# Patient Record
Sex: FEMALE | Race: WHITE | Employment: OTHER | URBAN - METROPOLITAN AREA
[De-identification: names, ages, dates, MRNs, and addresses within clinical notes are randomized per-mention and may not be internally consistent; named-entity substitution may affect disease eponyms.]

---

## 2017-01-04 DIAGNOSIS — E03.8 OTHER SPECIFIED HYPOTHYROIDISM: ICD-10-CM

## 2017-01-04 RX ORDER — LEVOTHYROXINE SODIUM 0.07 MG/1
75 TABLET ORAL DAILY
Qty: 90 TABLET | Refills: 3 | Status: SHIPPED | OUTPATIENT
Start: 2017-01-04 | End: 2017-12-12 | Stop reason: SDUPTHER

## 2017-02-27 ENCOUNTER — OFFICE VISIT (OUTPATIENT)
Dept: FAMILY MEDICINE CLINIC | Age: 67
End: 2017-02-27

## 2017-02-27 VITALS
SYSTOLIC BLOOD PRESSURE: 106 MMHG | DIASTOLIC BLOOD PRESSURE: 60 MMHG | TEMPERATURE: 99 F | HEART RATE: 70 BPM | RESPIRATION RATE: 16 BRPM | WEIGHT: 164 LBS | BODY MASS INDEX: 26.36 KG/M2 | HEIGHT: 66 IN

## 2017-02-27 DIAGNOSIS — Z12.31 ENCOUNTER FOR SCREENING MAMMOGRAM FOR BREAST CANCER: ICD-10-CM

## 2017-02-27 DIAGNOSIS — E03.9 HYPOTHYROIDISM, UNSPECIFIED TYPE: ICD-10-CM

## 2017-02-27 DIAGNOSIS — E03.9 HYPOTHYROIDISM, UNSPECIFIED TYPE: Primary | ICD-10-CM

## 2017-02-27 DIAGNOSIS — Z23 NEED FOR PNEUMOCOCCAL VACCINATION: ICD-10-CM

## 2017-02-27 LAB
ALBUMIN SERPL-MCNC: 4.5 G/DL (ref 3.9–4.9)
ALP BLD-CCNC: 110 U/L (ref 40–130)
ALT SERPL-CCNC: 11 U/L (ref 0–33)
ANION GAP SERPL CALCULATED.3IONS-SCNC: 11 MEQ/L (ref 7–13)
AST SERPL-CCNC: 15 U/L (ref 0–35)
BILIRUB SERPL-MCNC: 0.2 MG/DL (ref 0–1.2)
BUN BLDV-MCNC: 20 MG/DL (ref 8–23)
CALCIUM SERPL-MCNC: 9.7 MG/DL (ref 8.6–10.2)
CHLORIDE BLD-SCNC: 102 MEQ/L (ref 98–107)
CHOLESTEROL, TOTAL: 200 MG/DL (ref 0–199)
CO2: 27 MEQ/L (ref 22–29)
CREAT SERPL-MCNC: 0.89 MG/DL (ref 0.5–0.9)
GFR AFRICAN AMERICAN: >60
GFR NON-AFRICAN AMERICAN: >60
GLOBULIN: 2.7 G/DL (ref 2.3–3.5)
GLUCOSE BLD-MCNC: 92 MG/DL (ref 74–109)
HCT VFR BLD CALC: 38.2 % (ref 37–47)
HDLC SERPL-MCNC: 71 MG/DL (ref 40–59)
HEMOGLOBIN: 13 G/DL (ref 12–16)
LDL CHOLESTEROL CALCULATED: 87 MG/DL (ref 0–129)
MCH RBC QN AUTO: 30.5 PG (ref 27–31.3)
MCHC RBC AUTO-ENTMCNC: 34.2 % (ref 33–37)
MCV RBC AUTO: 89.2 FL (ref 82–100)
PDW BLD-RTO: 13.7 % (ref 11.5–14.5)
PLATELET # BLD: 252 K/UL (ref 130–400)
POTASSIUM SERPL-SCNC: 4.4 MEQ/L (ref 3.5–5.1)
RBC # BLD: 4.28 M/UL (ref 4.2–5.4)
SODIUM BLD-SCNC: 140 MEQ/L (ref 132–144)
T4 FREE: 1.6 NG/DL (ref 0.93–1.7)
TOTAL PROTEIN: 7.2 G/DL (ref 6.4–8.1)
TRIGL SERPL-MCNC: 211 MG/DL (ref 0–200)
TSH SERPL DL<=0.05 MIU/L-ACNC: 2.41 UIU/ML (ref 0.27–4.2)
WBC # BLD: 8.2 K/UL (ref 4.8–10.8)

## 2017-02-27 PROCEDURE — 90670 PCV13 VACCINE IM: CPT | Performed by: FAMILY MEDICINE

## 2017-02-27 PROCEDURE — 99214 OFFICE O/P EST MOD 30 MIN: CPT | Performed by: FAMILY MEDICINE

## 2017-02-27 PROCEDURE — 90471 IMMUNIZATION ADMIN: CPT | Performed by: FAMILY MEDICINE

## 2017-03-09 ENCOUNTER — HOSPITAL ENCOUNTER (OUTPATIENT)
Dept: WOMENS IMAGING | Age: 67
Discharge: HOME | End: 2017-03-09
Payer: MEDICARE

## 2017-03-09 DIAGNOSIS — Z12.31 ENCOUNTER FOR SCREENING MAMMOGRAM FOR BREAST CANCER: ICD-10-CM

## 2017-03-09 PROCEDURE — G0202 SCR MAMMO BI INCL CAD: HCPCS

## 2017-03-10 DIAGNOSIS — R92.8 ABNORMAL MAMMOGRAM: Primary | ICD-10-CM

## 2017-03-15 ENCOUNTER — HOSPITAL ENCOUNTER (OUTPATIENT)
Dept: WOMENS IMAGING | Age: 67
Discharge: HOME | End: 2017-03-15
Payer: MEDICARE

## 2017-03-15 DIAGNOSIS — R92.8 ABNORMAL MAMMOGRAM: Primary | ICD-10-CM

## 2017-03-15 DIAGNOSIS — R92.8 ABNORMAL MAMMOGRAM: ICD-10-CM

## 2017-03-15 PROCEDURE — G0206 DX MAMMO INCL CAD UNI: HCPCS

## 2017-03-21 ENCOUNTER — INITIAL CONSULT (OUTPATIENT)
Dept: SURGERY | Age: 67
End: 2017-03-21

## 2017-03-21 VITALS
BODY MASS INDEX: 26.84 KG/M2 | RESPIRATION RATE: 12 BRPM | HEIGHT: 66 IN | WEIGHT: 167 LBS | DIASTOLIC BLOOD PRESSURE: 76 MMHG | TEMPERATURE: 98.3 F | SYSTOLIC BLOOD PRESSURE: 118 MMHG | HEART RATE: 72 BPM

## 2017-03-21 DIAGNOSIS — R92.8 ABNORMALITY OF RIGHT BREAST ON SCREENING MAMMOGRAM: Primary | ICD-10-CM

## 2017-03-21 PROCEDURE — 99204 OFFICE O/P NEW MOD 45 MIN: CPT | Performed by: SURGERY

## 2017-03-21 ASSESSMENT — ENCOUNTER SYMPTOMS
EYE DISCHARGE: 0
ANAL BLEEDING: 0
EYE PAIN: 0
TROUBLE SWALLOWING: 0
CONSTIPATION: 0
VOMITING: 0
BACK PAIN: 0
CHEST TIGHTNESS: 0
STRIDOR: 0
COLOR CHANGE: 0
ABDOMINAL PAIN: 0
SHORTNESS OF BREATH: 0

## 2017-03-28 ENCOUNTER — PREP FOR PROCEDURE (OUTPATIENT)
Dept: SURGERY | Age: 67
End: 2017-03-28

## 2017-03-28 RX ORDER — LIDOCAINE HYDROCHLORIDE 20 MG/ML
5 INJECTION, SOLUTION EPIDURAL; INFILTRATION; INTRACAUDAL; PERINEURAL ONCE
Status: CANCELLED | OUTPATIENT
Start: 2017-03-28 | End: 2017-03-28

## 2017-03-28 RX ORDER — MAGNESIUM HYDROXIDE 1200 MG/15ML
1000 LIQUID ORAL CONTINUOUS
Status: CANCELLED | OUTPATIENT
Start: 2017-03-28

## 2017-03-29 ENCOUNTER — HOSPITAL ENCOUNTER (OUTPATIENT)
Dept: WOMENS IMAGING | Age: 67
Discharge: HOME | End: 2017-03-29
Payer: MEDICARE

## 2017-03-29 VITALS
BODY MASS INDEX: 25.71 KG/M2 | WEIGHT: 160 LBS | RESPIRATION RATE: 20 BRPM | DIASTOLIC BLOOD PRESSURE: 61 MMHG | HEIGHT: 66 IN | SYSTOLIC BLOOD PRESSURE: 118 MMHG | OXYGEN SATURATION: 99 % | HEART RATE: 65 BPM

## 2017-03-29 DIAGNOSIS — R92.8 ABNORMAL MAMMOGRAM: ICD-10-CM

## 2017-03-29 DIAGNOSIS — Z98.890 STATUS POST BIOPSY: ICD-10-CM

## 2017-03-29 PROCEDURE — 88305 TISSUE EXAM BY PATHOLOGIST: CPT

## 2017-03-29 PROCEDURE — 2500000003 HC RX 250 WO HCPCS: Performed by: SURGERY

## 2017-03-29 PROCEDURE — 88342 IMHCHEM/IMCYTCHM 1ST ANTB: CPT

## 2017-03-29 PROCEDURE — G0206 DX MAMMO INCL CAD UNI: HCPCS

## 2017-03-29 PROCEDURE — 19081 BX BREAST 1ST LESION STRTCTC: CPT

## 2017-03-29 PROCEDURE — 2580000003 HC RX 258: Performed by: SURGERY

## 2017-03-29 PROCEDURE — 88341 IMHCHEM/IMCYTCHM EA ADD ANTB: CPT

## 2017-03-29 RX ORDER — MAGNESIUM HYDROXIDE 1200 MG/15ML
250 LIQUID ORAL CONTINUOUS
Status: DISCONTINUED | OUTPATIENT
Start: 2017-03-29 | End: 2017-03-29 | Stop reason: SDUPTHER

## 2017-03-29 RX ORDER — LIDOCAINE HYDROCHLORIDE 20 MG/ML
20 INJECTION, SOLUTION INFILTRATION; PERINEURAL
Status: DISCONTINUED | OUTPATIENT
Start: 2017-03-29 | End: 2017-04-01 | Stop reason: HOSPADM

## 2017-03-29 RX ORDER — MAGNESIUM HYDROXIDE 1200 MG/15ML
1000 LIQUID ORAL CONTINUOUS
Status: DISCONTINUED | OUTPATIENT
Start: 2017-03-29 | End: 2017-03-29

## 2017-03-29 RX ORDER — 0.9 % SODIUM CHLORIDE 0.9 %
250 INTRAVENOUS SOLUTION INTRAVENOUS
Status: DISCONTINUED | OUTPATIENT
Start: 2017-03-29 | End: 2017-04-01 | Stop reason: HOSPADM

## 2017-03-29 RX ORDER — LIDOCAINE HYDROCHLORIDE 20 MG/ML
5 INJECTION, SOLUTION EPIDURAL; INFILTRATION; INTRACAUDAL; PERINEURAL ONCE
Status: DISCONTINUED | OUTPATIENT
Start: 2017-03-29 | End: 2017-03-29 | Stop reason: CLARIF

## 2017-03-29 RX ADMIN — SODIUM CHLORIDE 250 ML: 9 INJECTION, SOLUTION INTRAVENOUS at 09:48

## 2017-03-29 RX ADMIN — LIDOCAINE HYDROCHLORIDE 17 ML: 20 INJECTION, SOLUTION INFILTRATION; PERINEURAL at 10:04

## 2017-03-29 ASSESSMENT — PAIN - FUNCTIONAL ASSESSMENT
PAIN_FUNCTIONAL_ASSESSMENT: 0-10
PAIN_FUNCTIONAL_ASSESSMENT: 0-10

## 2017-04-04 ENCOUNTER — TELEPHONE (OUTPATIENT)
Dept: SURGERY | Age: 67
End: 2017-04-04

## 2017-04-06 ENCOUNTER — OFFICE VISIT (OUTPATIENT)
Dept: SURGERY | Age: 67
End: 2017-04-06

## 2017-04-06 VITALS
DIASTOLIC BLOOD PRESSURE: 72 MMHG | HEART RATE: 72 BPM | RESPIRATION RATE: 12 BRPM | WEIGHT: 167 LBS | BODY MASS INDEX: 26.84 KG/M2 | TEMPERATURE: 98.2 F | SYSTOLIC BLOOD PRESSURE: 120 MMHG | HEIGHT: 66 IN

## 2017-04-06 DIAGNOSIS — C50.919 MALIGNANT NEOPLASM OF FEMALE BREAST, UNSPECIFIED LATERALITY, UNSPECIFIED SITE OF BREAST: Primary | ICD-10-CM

## 2017-04-06 PROCEDURE — 99214 OFFICE O/P EST MOD 30 MIN: CPT | Performed by: SURGERY

## 2017-04-11 DIAGNOSIS — C50.919 MALIGNANT NEOPLASM OF OTHER SPECIFIED SITES OF FEMALE BREAST: Primary | ICD-10-CM

## 2017-04-13 ENCOUNTER — TELEPHONE (OUTPATIENT)
Dept: SURGERY | Age: 67
End: 2017-04-13

## 2017-12-12 DIAGNOSIS — E03.8 OTHER SPECIFIED HYPOTHYROIDISM: ICD-10-CM

## 2017-12-12 RX ORDER — LEVOTHYROXINE SODIUM 0.07 MG/1
75 TABLET ORAL DAILY
Qty: 90 TABLET | Refills: 3 | Status: SHIPPED | OUTPATIENT
Start: 2017-12-12 | End: 2017-12-27 | Stop reason: SDUPTHER

## 2017-12-12 NOTE — TELEPHONE ENCOUNTER
From: Alec Marie  Sent: 12/12/2017 10:51 AM EST  Subject: Medication Renewal Request    Darwin Darling. Mela Vincent would like a refill of the following medications:  levothyroxine (SYNTHROID) 75 MCG tablet Pelon Ching MD]    Preferred pharmacy: Other - mail in    Comment:  Hello, I would like to get a written renewal on my prescription. I will be sending it to a mail in prescriptions program. I will pick it up at the office when it is ready.  Thank you Louisa Cervantes

## 2017-12-27 DIAGNOSIS — E03.8 OTHER SPECIFIED HYPOTHYROIDISM: ICD-10-CM

## 2017-12-27 RX ORDER — LEVOTHYROXINE SODIUM 0.07 MG/1
TABLET ORAL
Qty: 90 TABLET | Refills: 0 | Status: SHIPPED | OUTPATIENT
Start: 2017-12-27 | End: 2018-12-14 | Stop reason: SDUPTHER

## 2018-05-07 ENCOUNTER — HOSPITAL ENCOUNTER (OUTPATIENT)
Dept: WOMENS IMAGING | Age: 68
Discharge: HOME | End: 2018-05-09
Payer: MEDICARE

## 2018-05-07 DIAGNOSIS — Z85.3 HX OF BREAST CANCER: ICD-10-CM

## 2018-05-07 PROCEDURE — G0279 TOMOSYNTHESIS, MAMMO: HCPCS

## 2018-07-06 ENCOUNTER — OFFICE VISIT (OUTPATIENT)
Dept: FAMILY MEDICINE CLINIC | Age: 68
End: 2018-07-06
Payer: MEDICARE

## 2018-07-06 VITALS
SYSTOLIC BLOOD PRESSURE: 114 MMHG | TEMPERATURE: 98.1 F | HEIGHT: 66 IN | WEIGHT: 173 LBS | HEART RATE: 76 BPM | DIASTOLIC BLOOD PRESSURE: 62 MMHG | BODY MASS INDEX: 27.8 KG/M2 | RESPIRATION RATE: 16 BRPM

## 2018-07-06 DIAGNOSIS — E03.9 HYPOTHYROIDISM, UNSPECIFIED TYPE: ICD-10-CM

## 2018-07-06 DIAGNOSIS — Z00.00 ANNUAL PHYSICAL EXAM: ICD-10-CM

## 2018-07-06 DIAGNOSIS — Z23 NEED FOR PNEUMOCOCCAL VACCINATION: ICD-10-CM

## 2018-07-06 DIAGNOSIS — M15.1 HEBERDEN'S NODE: ICD-10-CM

## 2018-07-06 DIAGNOSIS — Z00.00 ANNUAL PHYSICAL EXAM: Primary | ICD-10-CM

## 2018-07-06 PROBLEM — C50.919 DUCTAL CARCINOMA IN SITU OF BREAST WITH MICROINVASIVE COMPONENT (HCC): Status: ACTIVE | Noted: 2017-05-18

## 2018-07-06 LAB
ALBUMIN SERPL-MCNC: 4.3 G/DL (ref 3.9–4.9)
ALP BLD-CCNC: 115 U/L (ref 40–130)
ALT SERPL-CCNC: 16 U/L (ref 0–33)
ANION GAP SERPL CALCULATED.3IONS-SCNC: 15 MEQ/L (ref 7–13)
AST SERPL-CCNC: 22 U/L (ref 0–35)
BILIRUB SERPL-MCNC: 0.3 MG/DL (ref 0–1.2)
BUN BLDV-MCNC: 20 MG/DL (ref 8–23)
CALCIUM SERPL-MCNC: 9.9 MG/DL (ref 8.6–10.2)
CHLORIDE BLD-SCNC: 108 MEQ/L (ref 98–107)
CHOLESTEROL, TOTAL: 184 MG/DL (ref 0–199)
CO2: 25 MEQ/L (ref 22–29)
CREAT SERPL-MCNC: 0.81 MG/DL (ref 0.5–0.9)
GFR AFRICAN AMERICAN: >60
GFR NON-AFRICAN AMERICAN: >60
GLOBULIN: 3.4 G/DL (ref 2.3–3.5)
GLUCOSE BLD-MCNC: 97 MG/DL (ref 74–109)
HCT VFR BLD CALC: 38.7 % (ref 37–47)
HDLC SERPL-MCNC: 69 MG/DL (ref 40–59)
HEMOGLOBIN: 13.3 G/DL (ref 12–16)
LDL CHOLESTEROL CALCULATED: 99 MG/DL (ref 0–129)
MCH RBC QN AUTO: 31.9 PG (ref 27–31.3)
MCHC RBC AUTO-ENTMCNC: 34.5 % (ref 33–37)
MCV RBC AUTO: 92.7 FL (ref 82–100)
PDW BLD-RTO: 13.6 % (ref 11.5–14.5)
PLATELET # BLD: 227 K/UL (ref 130–400)
POTASSIUM SERPL-SCNC: 4.9 MEQ/L (ref 3.5–5.1)
RBC # BLD: 4.18 M/UL (ref 4.2–5.4)
SODIUM BLD-SCNC: 148 MEQ/L (ref 132–144)
T4 FREE: 1.48 NG/DL (ref 0.93–1.7)
TOTAL PROTEIN: 7.7 G/DL (ref 6.4–8.1)
TRIGL SERPL-MCNC: 81 MG/DL (ref 0–200)
TSH SERPL DL<=0.05 MIU/L-ACNC: 1.44 UIU/ML (ref 0.27–4.2)
WBC # BLD: 4.6 K/UL (ref 4.8–10.8)

## 2018-07-06 PROCEDURE — 99214 OFFICE O/P EST MOD 30 MIN: CPT | Performed by: FAMILY MEDICINE

## 2018-07-06 PROCEDURE — 90732 PPSV23 VACC 2 YRS+ SUBQ/IM: CPT | Performed by: FAMILY MEDICINE

## 2018-07-06 PROCEDURE — G0009 ADMIN PNEUMOCOCCAL VACCINE: HCPCS | Performed by: FAMILY MEDICINE

## 2018-07-06 ASSESSMENT — PATIENT HEALTH QUESTIONNAIRE - PHQ9
2. FEELING DOWN, DEPRESSED OR HOPELESS: 0
SUM OF ALL RESPONSES TO PHQ QUESTIONS 1-9: 0
SUM OF ALL RESPONSES TO PHQ9 QUESTIONS 1 & 2: 0
1. LITTLE INTEREST OR PLEASURE IN DOING THINGS: 0

## 2018-07-06 NOTE — PATIENT INSTRUCTIONS
life easier, such as a higher toilet seat and padded handles on kitchen utensils. · Do not sit in low chairs, which can make it hard to get up. · Put heat or cold on your sore joints as needed. Use whichever helps you most. You also can take turns with hot and cold packs. ¨ Apply heat 2 or 3 times a day for 20 to 30 minutes-using a heating pad, hot shower, or hot pack-to relieve pain and stiffness. ¨ Put ice or a cold pack on your sore joint for 10 to 20 minutes at a time. Put a thin cloth between the ice and your skin. When should you call for help? Call your doctor now or seek immediate medical care if:    · You have sudden swelling, warmth, or pain in any joint.     · You have joint pain and a fever or rash.     · You have such bad pain that you cannot use a joint.    Watch closely for changes in your health, and be sure to contact your doctor if:    · You have mild joint symptoms that continue even with more than 6 weeks of care at home.     · You have stomach pain or other problems with your medicine. Where can you learn more? Go to https://[employer] ([employer]). org and sign in to your [employer] account. Enter M312 in the [employer] box to learn more about \"Arthritis: Care Instructions. \"     If you do not have an account, please click on the \"Sign Up Now\" link. Current as of: October 10, 2017  Content Version: 11.6  © 3011-9144 [employer]. Care instructions adapted under license by [facility] ([facility]). If you have questions about a medical condition or this instruction, always ask your healthcare professional. [place] [address] any warranty or liability for your use of this information. Patient Education        Osteoarthritis: Care Instructions  Your Care Instructions    Arthritis is a common health problem in which the joints are inflamed. There are several kinds of arthritis.  Osteoarthritis is caused by a breakdown of cartilage, the hard, thick tissue

## 2018-07-06 NOTE — PROGRESS NOTES
hematuria. Last labs  No visits with results within 3 Month(s) from this visit. Latest known visit with results is:   Orders Only on 02/27/2017   Component Date Value Ref Range Status    Cholesterol, Total 02/27/2017 200* 0 - 199 mg/dL Final    ATP III Cholesterol Classification is Borderline High.  Triglycerides 02/27/2017 211* 0 - 200 mg/dL Final    ATP III Triglycerides Classification is High.  HDL 02/27/2017 71* 40 - 59 mg/dL Final    Comment: ATP III HDL Cholesterol Classification is high. Expected Values:    Males:    >55 = No Risk            35-55 = Moderate Risk            <35 = High Risk    Females:  >65 = No Risk            45-65 = Moderate Risk            <45 = High Risk    NCEP Guidelines:   Third Report May 2001  >59 = negative risk factor for CHD  <40 = major risk factor for CHD      LDL Calculated 02/27/2017 87  0 - 129 mg/dL Final    ATP III LDL Classification is Optimal.    WBC 02/27/2017 8.2  4.8 - 10.8 K/uL Final    RBC 02/27/2017 4.28  4.20 - 5.40 M/uL Final    Hemoglobin 02/27/2017 13.0  12.0 - 16.0 g/dL Final    Hematocrit 02/27/2017 38.2  37.0 - 47.0 % Final    MCV 02/27/2017 89.2  82.0 - 100.0 fL Final    MCH 02/27/2017 30.5  27.0 - 31.3 pg Final    MCHC 02/27/2017 34.2  33.0 - 37.0 % Final    RDW 02/27/2017 13.7  11.5 - 14.5 % Final    Platelets 72/75/6155 252  130 - 400 K/uL Final    Sodium 02/27/2017 140  132 - 144 mEq/L Final    Potassium 02/27/2017 4.4  3.5 - 5.1 mEq/L Final    Chloride 02/27/2017 102  98 - 107 mEq/L Final    CO2 02/27/2017 27  22 - 29 mEq/L Final    Anion Gap 02/27/2017 11  7 - 13 mEq/L Final    Glucose 02/27/2017 92  74 - 109 mg/dL Final    BUN 02/27/2017 20  8 - 23 mg/dL Final    CREATININE 02/27/2017 0.89  0.50 - 0.90 mg/dL Final    GFR Non- 02/27/2017 >60.0  >60 Final    Comment: >60 mL/min/1.73m2 EGFR, calc. for ages 25 and older using the  MDRD formula (not corrected for weight), is valid for stable  renal motions intact and pain free. Pupils reactive/equal    Sclerae and conjunctivae clear    NECK: No masses or adenopathy palpable. No carotid bruits heard. No asymmetry visible. No thyromegaly. RESPIRATORY:   Clear/ Equal breath sounds /No acute respiratory distress. No wheezes,rales, or rhonchi. No percussive abnormalities    HEART: Regular rhythm without murmur, rub or gallop. ABDOMEN: overwt  Soft, non tender. No masses, guarding or rebound. Normo active bowel sounds. EXTREMITIES:  No edema in any extremity. No cyanosis or clubbing. 2+ dorsalis pedis pulses bilaterally          Assessment & Plan    Diagnosis Orders   1. Annual physical exam  CBC    Lipid Panel    Comprehensive Metabolic Panel   2. Hypothyroidism, unspecified type  T4, Free    TSH without Reflex   3. Need for pneumococcal vaccination  Pneumococcal polysaccharide vaccine 23-valent greater than or equal to 3yo subcutaneous/IM     Orders Placed This Encounter   Procedures    Pneumococcal polysaccharide vaccine 23-valent greater than or equal to 3yo subcutaneous/IM    CBC     Standing Status:   Future     Standing Expiration Date:   7/6/2019    Lipid Panel     Standing Status:   Future     Standing Expiration Date:   7/6/2019     Order Specific Question:   Is Patient Fasting?/# of Hours     Answer:   12    Comprehensive Metabolic Panel     Standing Status:   Future     Standing Expiration Date:   7/6/2018    T4, Free     Standing Status:   Future     Standing Expiration Date:   7/6/2019    TSH without Reflex     Standing Status:   Future     Standing Expiration Date:   7/6/2019     No orders of the defined types were placed in this encounter. There are no discontinued medications. No Follow-up on file. Nohemi Maya is advised to follow up ASAP if condition deteriorates or problems arise and if no information on test results to patient in the next 1 month they are advised to call us.      Karla Najera,

## 2018-10-24 ENCOUNTER — OFFICE VISIT (OUTPATIENT)
Dept: FAMILY MEDICINE CLINIC | Age: 68
End: 2018-10-24
Payer: MEDICARE

## 2018-10-24 VITALS
TEMPERATURE: 97.9 F | RESPIRATION RATE: 16 BRPM | DIASTOLIC BLOOD PRESSURE: 62 MMHG | WEIGHT: 174 LBS | SYSTOLIC BLOOD PRESSURE: 104 MMHG | OXYGEN SATURATION: 98 % | HEIGHT: 66 IN | HEART RATE: 54 BPM | BODY MASS INDEX: 27.97 KG/M2

## 2018-10-24 DIAGNOSIS — E03.9 HYPOTHYROIDISM, UNSPECIFIED TYPE: Primary | ICD-10-CM

## 2018-10-24 DIAGNOSIS — H65.111 ACUTE MUCOID OTITIS MEDIA OF RIGHT EAR: ICD-10-CM

## 2018-10-24 DIAGNOSIS — R05.9 COUGH: ICD-10-CM

## 2018-10-24 PROCEDURE — 99213 OFFICE O/P EST LOW 20 MIN: CPT | Performed by: FAMILY MEDICINE

## 2018-10-24 RX ORDER — AMOXICILLIN 875 MG/1
875 TABLET, COATED ORAL 2 TIMES DAILY
Qty: 20 TABLET | Refills: 0 | Status: SHIPPED | OUTPATIENT
Start: 2018-10-24 | End: 2018-11-03

## 2018-10-24 NOTE — PROGRESS NOTES
Subjective  Kaushal Corley, 78 y.o. female presents today with:  Chief Complaint   Patient presents with    Cough     x 1 month     Non smoker  Went to [address] [place] [address] 153 had uri and cough since  Not major  But had laryngitis and much cough   on chemo        Past Medical History:   Diagnosis Date    Breast cancer ([address] 2017    radiation s/p x 20    Hematuria     History of MRI of brain and brain stem 12/2011    normal    Hyperlipidemia     Hypothyroidism     Shingles     Urinary incontinence      Past Surgical History:   Procedure Laterality Date    BLADDER SUSPENSION      COLONOSCOPY  9/15/14,2011    negative, dr Jac Kamara  2009    negative    FOOT SURGERY  2009    JOINT REPLACEMENT Left     just surgery    KNEE ARTHROSCOPY  2006    THYROID SURGERY  2000    thyroidectomy-no Ca    TOTAL KNEE ARTHROPLASTY Left 11/02/2016    TUBAL LIGATION  1977     Social History     Social History    Marital status:      Spouse name: N/A    Number of children: N/A    Years of education: N/A     Occupational History    Not on file. Social History Main Topics    Smoking status: Never Smoker    Smokeless tobacco: Never Used    Alcohol use Yes      Comment: few/mo    Drug use: No    Sexual activity: Not on file     Other Topics Concern    Not on file     Social History Narrative    No narrative on file     Family History   Problem Relation Age of Onset    Cancer Mother         colon cancer    Asthma Sister         emphysemia    Cancer Brother      No Known Allergies  Current Outpatient Prescriptions   Medication Sig Dispense Refill    levothyroxine (SYNTHROID) 75 MCG tablet TAKE 1 TABLET DAILY 90 tablet 0     No current facility-administered medications for this visit. The patient denies any history of      seizures,             heart attack or KNOWN CAD        or stroke. No chest pain, shortness of breath, paroxysmal nocturnal dyspnea.      No nausea, vomiting, diarrhea,

## 2018-11-01 ENCOUNTER — TELEPHONE (OUTPATIENT)
Dept: FAMILY MEDICINE CLINIC | Age: 68
End: 2018-11-01

## 2018-11-12 ENCOUNTER — TELEPHONE (OUTPATIENT)
Dept: FAMILY MEDICINE CLINIC | Age: 68
End: 2018-11-12

## 2018-11-12 DIAGNOSIS — R05.3 PERSISTENT COUGH: Primary | ICD-10-CM

## 2018-12-14 DIAGNOSIS — E03.8 OTHER SPECIFIED HYPOTHYROIDISM: ICD-10-CM

## 2018-12-14 RX ORDER — LEVOTHYROXINE SODIUM 0.07 MG/1
TABLET ORAL
Qty: 90 TABLET | Refills: 1 | Status: SHIPPED | OUTPATIENT
Start: 2018-12-14

## 2019-04-01 ENCOUNTER — OFFICE VISIT (OUTPATIENT)
Dept: SURGERY | Age: 69
End: 2019-04-01
Payer: MEDICARE

## 2019-04-01 VITALS
SYSTOLIC BLOOD PRESSURE: 140 MMHG | HEART RATE: 62 BPM | WEIGHT: 181 LBS | HEIGHT: 65 IN | BODY MASS INDEX: 30.16 KG/M2 | RESPIRATION RATE: 18 BRPM | DIASTOLIC BLOOD PRESSURE: 77 MMHG

## 2019-04-01 DIAGNOSIS — C50.911 DUCTAL CARCINOMA IN SITU (DCIS) OF RIGHT BREAST WITH MICROINVASIVE COMPONENT (HCC): Primary | ICD-10-CM

## 2019-04-01 PROCEDURE — 99213 OFFICE O/P EST LOW 20 MIN: CPT | Performed by: SURGERY

## 2019-04-01 SDOH — HEALTH STABILITY: MENTAL HEALTH: HOW OFTEN DO YOU HAVE A DRINK CONTAINING ALCOHOL?: MONTHLY OR LESS

## 2019-04-01 ASSESSMENT — ENCOUNTER SYMPTOMS
ABDOMINAL PAIN: 0
SORE THROAT: 0
SHORTNESS OF BREATH: 0
CHEST TIGHTNESS: 0
NAUSEA: 0
COLOR CHANGE: 0
COUGH: 0
VOMITING: 0

## 2019-04-01 NOTE — PROGRESS NOTES
NEW BREAST PATIENT         SERVICE DATE: 4/1/19  SERVICE TIME:  9:21 AM    REFERRED BY: Dr. Chauncey Grimm VISIT:    Chief Complaint   Patient presents with    Breast Cancer     History of Right Breast Cancer      CHAPERONE WAS OFFERED, PATIENT RESPONDED: no    HISTORY AND CHIEF COMPLAINT:  Carlton Zuleta is a 80 y.o.  female who presents with the complaint of having History Of Right Breast Cancer. She had right breast DCIS with microinvasion. She had a sentinel lymph node biopsy that was negative. She received radiation but declined hormonal therapy. She is here for follow-up. She was diagnosed in 2017    BREAST HISTORY  Her past breast history (prior to this encounter) is as follows: Abnormal mammogram:   Yes  Abnormal Breast US:  Yes  Breast biopsy:    Yes  Breast cysts:    No  Breast surgery:    Yes  Breast cancer              Yes    RISK FACTORS FOR BREAST CANCER:  Family History of Breast Cancer: The patient has a personal history of breast cancer.   History of ovarian cancer: no  Ashkenazi Ancestry: no  Age at the birth of first child: 24  Age at the onset of menses: 16  Age at menopause: 47   Hormonal therapy: no  Postmenopausal obesity: no    BRA SIZE: 36A    Past Medical History:   Diagnosis Date    Breast cancer ([address] 2017    radiation s/p x 20    Hematuria     History of MRI of brain and brain stem 12/2011    normal    Hyperlipidemia     Hypothyroidism     Shingles     Urinary incontinence      Past Surgical History:   Procedure Laterality Date    BLADDER SUSPENSION      COLONOSCOPY  9/15/14,2011    negative, dr Ramirez Setting  2009    negative    FOOT SURGERY  2009    JOINT REPLACEMENT Left     just surgery    KNEE ARTHROSCOPY  2006    THYROID SURGERY  2000    thyroidectomy-no Ca    TOTAL KNEE ARTHROPLASTY Left 11/02/2016    TUBAL LIGATION  1977     Family History   Problem Relation Age of Onset    Cancer Mother         colon cancer    Asthma Sister         emphysemia    Cancer Brother      Social History     Socioeconomic History    Marital status:      Spouse name: Not on file    Number of children: Not on file    Years of education: Not on file    Highest education level: Not on file   Occupational History    Not on file   Social Needs    Financial resource strain: Not on file    Food insecurity:     Worry: Not on file     Inability: Not on file    Transportation needs:     Medical: Not on file     Non-medical: Not on file   Tobacco Use    Smoking status: Never Smoker    Smokeless tobacco: Never Used   Substance and Sexual Activity    Alcohol use: Yes     Frequency: Monthly or less    Drug use: No    Sexual activity: Not Currently   Lifestyle    Physical activity:     Days per week: Not on file     Minutes per session: Not on file    Stress: Not on file   Relationships    Social connections:     Talks on phone: Not on file     Gets together: Not on file     Attends Mormonism service: Not on file     Active member of club or organization: Not on file     Attends meetings of clubs or organizations: Not on file     Relationship status: Not on file    Intimate partner violence:     Fear of current or ex partner: Not on file     Emotionally abused: Not on file     Physically abused: Not on file     Forced sexual activity: Not on file   Other Topics Concern    Not on file   Social History Narrative    Not on file     Under Care of PCP  Review of Systems   Constitutional: Negative for activity change, appetite change, chills, diaphoresis, fatigue, fever and unexpected weight change. HENT: Negative for congestion, ear pain, hearing loss, mouth sores, nosebleeds and sore throat. Respiratory: Negative for cough, chest tightness and shortness of breath. Cardiovascular: Negative for chest pain, palpitations and leg swelling. Gastrointestinal: Negative for abdominal pain, nausea and vomiting.    Endocrine: Negative for cold intolerance, heat intolerance, polydipsia, polyphagia and polyuria. Genitourinary: Negative for difficulty urinating, menstrual problem and vaginal bleeding. Musculoskeletal: Positive for arthralgias. Negative for neck pain and neck stiffness. Skin: Negative for color change, pallor, rash and wound. Allergic/Immunologic: Negative for environmental allergies and immunocompromised state. Neurological: Negative for weakness. Hematological: Does not bruise/bleed easily. Psychiatric/Behavioral: Negative for agitation, confusion, sleep disturbance and suicidal ideas. The patient is not nervous/anxious. Have you ever tested positive for AIDS? no  Have you ever tested positive for Hepatitis? no    ANTICOAGULANT MEDICATIONS:  none    SOCIAL HISTORY   Marital status:   Occupation:  Retired   Tobacco use: Antionette Posey  reports that she has never smoked. She has never used smokeless tobacco.  Alcohol use: Antionette Posey  reports that she drinks alcohol. Drug use: Antionette Posey  reports that she does not use drugs. Caffeine intake: 3 cups of caffeinated coffee per day(s)  Exercise:  very active    BP (!) 140/77   Pulse 62   Resp 18   Ht 5' 5\" (1.651 m)   Wt 181 lb (82.1 kg)   LMP 01/10/2005 (Approximate)   BMI 30.12 kg/m²     Physical Exam   Constitutional: She is oriented to person, place, and time. She appears well-developed and well-nourished. She is cooperative. HENT:   Head: Normocephalic and atraumatic. Nose: Nose normal.   Eyes: Conjunctivae are normal. Right conjunctiva has no hemorrhage. Left conjunctiva has no hemorrhage. Neck: Normal range of motion. Neck supple. Cardiovascular: Normal rate. Pulmonary/Chest: Effort normal. No respiratory distress. Right breast exhibits skin change (s/p right lumpectomy). Right breast exhibits no inverted nipple, no mass, no nipple discharge and no tenderness.  Left breast exhibits no inverted nipple, no mass, no nipple discharge, no skin change and no tenderness. No breast swelling, tenderness, discharge or bleeding. Breasts are symmetrical.   Abdominal: Normal appearance. Genitourinary: No breast swelling, tenderness, discharge or bleeding. Musculoskeletal: Normal range of motion. Lymphadenopathy:     She has no cervical adenopathy. Right cervical: No superficial cervical, no deep cervical and no posterior cervical adenopathy present. Left cervical: No superficial cervical, no deep cervical and no posterior cervical adenopathy present. She has no axillary adenopathy. Right axillary: No pectoral and no lateral adenopathy present. Left axillary: No pectoral and no lateral adenopathy present. Right: No supraclavicular adenopathy present. Left: No supraclavicular adenopathy present. Neurological: She is alert and oriented to person, place, and time. She is not disoriented. Skin: Skin is warm and dry. No abrasion noted. No erythema. Psychiatric: She has a normal mood and affect. Her speech is normal and behavior is normal. Judgment and thought content normal. Cognition and memory are normal.   Vitals reviewed. ASSESSMENT    IMPRESSION :      ICD-10-CM    1. 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 The order was placed today. I recommended 3D (tomosynthesis)  Follow-up medical oncology  Follow-up radiation oncology  Recommend daily exercise  She is feeling well and declines any additional intervention. Orders Placed This Encounter   Procedures    AWA DIGITAL SCREEN W CAD BILATERAL     Standing Status:   Future     Standing Expiration Date:   6/1/2020     Order Specific Question:   Reason for exam:     Answer:   WITH TREVON      No orders of the defined types were placed in this encounter. Return in about 1 year (around 4/1/2020).      Data Unavailable       ERWIN Jacinda Jasso MD    CC: Anali Cameron MD

## 2019-04-08 ENCOUNTER — TELEPHONE (OUTPATIENT)
Dept: FAMILY MEDICINE CLINIC | Age: 69
End: 2019-04-08

## 2019-05-08 ENCOUNTER — HOSPITAL ENCOUNTER (OUTPATIENT)
Dept: WOMENS IMAGING | Age: 69
Discharge: HOME | End: 2019-05-10
Payer: MEDICARE

## 2019-05-08 DIAGNOSIS — Z12.31 ENCOUNTER FOR SCREENING MAMMOGRAM FOR BREAST CANCER: ICD-10-CM

## 2019-05-08 DIAGNOSIS — C50.911 DUCTAL CARCINOMA IN SITU (DCIS) OF RIGHT BREAST WITH MICROINVASIVE COMPONENT (HCC): ICD-10-CM

## 2019-05-08 PROCEDURE — 77063 BREAST TOMOSYNTHESIS BI: CPT

## 2019-05-13 ENCOUNTER — TELEPHONE (OUTPATIENT)
Dept: SURGERY | Age: 69
End: 2019-05-13

## 2019-05-13 DIAGNOSIS — Z12.31 ENCOUNTER FOR SCREENING MAMMOGRAM FOR BREAST CANCER: ICD-10-CM

## 2019-05-13 DIAGNOSIS — C50.911 DUCTAL CARCINOMA IN SITU (DCIS) OF RIGHT BREAST WITH MICROINVASIVE COMPONENT (HCC): Primary | ICD-10-CM

## 2019-05-13 DIAGNOSIS — D05.11 DUCTAL CARCINOMA IN SITU (DCIS) OF RIGHT BREAST: ICD-10-CM

## 2019-06-27 ENCOUNTER — OFFICE VISIT (OUTPATIENT)
Dept: SURGERY | Age: 69
End: 2019-06-27
Payer: MEDICARE

## 2019-06-27 VITALS
SYSTOLIC BLOOD PRESSURE: 112 MMHG | HEIGHT: 65 IN | TEMPERATURE: 98.3 F | DIASTOLIC BLOOD PRESSURE: 82 MMHG | BODY MASS INDEX: 29.22 KG/M2 | WEIGHT: 175.4 LBS

## 2019-06-27 DIAGNOSIS — R22.32 AXILLARY MASS, LEFT: Primary | ICD-10-CM

## 2019-06-27 PROCEDURE — 99202 OFFICE O/P NEW SF 15 MIN: CPT | Performed by: SURGERY

## 2019-06-27 ASSESSMENT — ENCOUNTER SYMPTOMS
CHEST TIGHTNESS: 0
RHINORRHEA: 0
COLOR CHANGE: 0
BLOOD IN STOOL: 0
ABDOMINAL DISTENTION: 0
SHORTNESS OF BREATH: 0
ABDOMINAL PAIN: 0
NAUSEA: 0
ALLERGIC/IMMUNOLOGIC NEGATIVE: 1
RECTAL PAIN: 0

## 2019-06-27 NOTE — PROGRESS NOTES
Subjective:      Patient ID: Silvia Dc is a 80 y.o. female. HPI  Silvia Dc is a 80 y.o. female seen at the request of Dr Esther Prince for a soft tissue lesion of the left posterior axillary area. It has been there for 4+ months and has been getting larger. It is painful in her left shoulder and neck. The patient admits to infection/inflammation. There is not a history of previous surgery at this site. The patient does not have similar lesions. Testing has not been done. Review of Systems   Constitutional: Negative for activity change, appetite change and unexpected weight change. HENT: Negative for congestion, nosebleeds, rhinorrhea and sneezing. Eyes: Negative for visual disturbance. Respiratory: Negative for chest tightness and shortness of breath. Cardiovascular: Negative for chest pain and leg swelling. Gastrointestinal: Negative for abdominal distention, abdominal pain, blood in stool, nausea and rectal pain. Endocrine: Negative. Genitourinary: Negative for difficulty urinating. Musculoskeletal: Positive for arthralgias. Skin: Negative for color change. Allergic/Immunologic: Negative. Neurological: Negative for seizures, light-headedness, numbness and headaches. Hematological: Does not bruise/bleed easily. Psychiatric/Behavioral: Negative for sleep disturbance. Objective:   Physical Exam   Constitutional: She is oriented to person, place, and time. She appears well-developed and well-nourished. No distress. HENT:   Mouth/Throat: Oropharynx is clear and moist.   Eyes: Pupils are equal, round, and reactive to light. Neck: No tracheal deviation present. No thyromegaly present. Pulmonary/Chest:           Musculoskeletal:   Normal gait   Neurological: She is alert and oriented to person, place, and time. Psychiatric: She has a normal mood and affect.  Judgment normal.     /82   Temp 98.3 °F (36.8 °C) (Temporal)   Ht 5' 5\" (1.651 m)   Wt 175 lb 6.4 oz (79.6 kg)   LMP 01/10/2005 (Approximate)   BMI 29.19 kg/m²   Assessment:      Left posterior axillary/shoulder soft tissue mass  History of right breast cancer      Plan:      CT scan of the chest  Follow-up visit in 2 weeks        Sharda Hercules MD

## 2019-07-05 ENCOUNTER — HOSPITAL ENCOUNTER (OUTPATIENT)
Dept: CT IMAGING | Age: 69
Discharge: HOME | End: 2019-07-07
Payer: MEDICARE

## 2019-07-05 DIAGNOSIS — R22.32 AXILLARY MASS, LEFT: ICD-10-CM

## 2019-07-05 PROCEDURE — 71260 CT THORAX DX C+: CPT

## 2019-07-05 PROCEDURE — 6360000004 HC RX CONTRAST MEDICATION: Performed by: SURGERY

## 2019-07-05 RX ADMIN — IOPAMIDOL 75 ML: 612 INJECTION, SOLUTION INTRAVENOUS at 08:57

## 2019-07-10 ENCOUNTER — OFFICE VISIT (OUTPATIENT)
Dept: SURGERY | Age: 69
End: 2019-07-10
Payer: MEDICARE

## 2019-07-10 VITALS
SYSTOLIC BLOOD PRESSURE: 110 MMHG | DIASTOLIC BLOOD PRESSURE: 60 MMHG | HEIGHT: 65 IN | BODY MASS INDEX: 29.09 KG/M2 | WEIGHT: 174.6 LBS | TEMPERATURE: 98.2 F

## 2019-07-10 DIAGNOSIS — R22.32 AXILLARY MASS, LEFT: Primary | ICD-10-CM

## 2019-07-10 PROCEDURE — 99213 OFFICE O/P EST LOW 20 MIN: CPT | Performed by: SURGERY

## 2019-07-10 ASSESSMENT — ENCOUNTER SYMPTOMS
RHINORRHEA: 0
ALLERGIC/IMMUNOLOGIC NEGATIVE: 1
NAUSEA: 0
ABDOMINAL DISTENTION: 0
RECTAL PAIN: 0
BLOOD IN STOOL: 0
CHEST TIGHTNESS: 0
SHORTNESS OF BREATH: 0
ABDOMINAL PAIN: 0
COLOR CHANGE: 0

## 2019-10-22 ENCOUNTER — TELEPHONE (OUTPATIENT)
Dept: GASTROENTEROLOGY | Age: 69
End: 2019-10-22

## 2020-04-10 ENCOUNTER — TELEPHONE (OUTPATIENT)
Dept: FAMILY MEDICINE CLINIC | Age: 70
End: 2020-04-10

## 2020-04-10 NOTE — TELEPHONE ENCOUNTER
Roger Rivera from scheduling called and stated that her insurance [facility]) will not cover it based on the code that is listed on the mammogram.  The code listed is D05.11. The mammogram order is from 5/15/19. Scheduling will need a new order with different code for insurance to cover.

## 2020-11-17 ENCOUNTER — HOSPITAL ENCOUNTER (OUTPATIENT)
Dept: WOMENS IMAGING | Age: 70
Discharge: HOME | End: 2020-11-19
Payer: MEDICARE

## 2020-11-17 PROCEDURE — 77063 BREAST TOMOSYNTHESIS BI: CPT

## 2021-09-15 ENCOUNTER — OFFICE VISIT (OUTPATIENT)
Dept: SURGERY | Age: 71
End: 2021-09-15
Payer: MEDICARE

## 2021-09-15 VITALS
HEIGHT: 65 IN | BODY MASS INDEX: 26.82 KG/M2 | WEIGHT: 161 LBS | SYSTOLIC BLOOD PRESSURE: 118 MMHG | TEMPERATURE: 97.3 F | DIASTOLIC BLOOD PRESSURE: 70 MMHG

## 2021-09-15 DIAGNOSIS — R22.32 AXILLARY MASS, LEFT: Primary | ICD-10-CM

## 2021-09-15 PROCEDURE — 99213 OFFICE O/P EST LOW 20 MIN: CPT | Performed by: SURGERY

## 2021-09-15 ASSESSMENT — ENCOUNTER SYMPTOMS
CHEST TIGHTNESS: 0
BLOOD IN STOOL: 0
NAUSEA: 0
ABDOMINAL PAIN: 0
ALLERGIC/IMMUNOLOGIC NEGATIVE: 1
ABDOMINAL DISTENTION: 0
RHINORRHEA: 0
COLOR CHANGE: 0
RECTAL PAIN: 0
SHORTNESS OF BREATH: 0

## 2021-09-15 NOTE — PROGRESS NOTES
Beck Seay (:  1950) is a 72 y.o. female,Established patient, here for evaluation of the following chief complaint(s): Other (Ep, lipoma on left shoulder)         ASSESSMENT/PLAN:  Left posterior axillary soft tissue mass that has been enlarging        Excision of left posterior axillary lipoma       The options of therapy were discussed. The procedure of the excision as well as potential risks and complications were discussed including but not exclusive to recurrence, infection, difficulty with wound healing and blood loss. The patient understands and is agreeable to the surgery. The patient was counseled at length about the risks of bridgette Covid-19 in the perioperative period and any recovery window from their procedure. The patient was made aware that bridgette Covid-19  may worsen their prognosis for recovering from their procedure  and lend to a higher morbidity and/or mortality risk. The patient was given the options of postponing their procedure. All material risks, benefits, and alternatives were discussed. The patient does wish to proceed with the procedure at this time. Please note this report has been partially produced using speech recognition software and may cause contain errors related to that system including grammar, punctuation and spelling as well as words and phrases that may seem inappropriate. If there are questions or concerns please feel free to contact me to clarify        Subjective   SUBJECTIVE/OBJECTIVE:  HPI  Beck Seay is a 72 y.o. female seen in follow-up for a soft tissue lesion of the left posterior axillary area. It has been there for almost 3 years and has been getting larger. It is painful in her left shoulder and neck. The patient admits to infection/inflammation. There is not a history of previous surgery at this site. CT scan of her chest on 2019 showed no axillary adenopathy and the soft tissue mass was not seen on CAT scan.   She was scheduled to have it done 2 years ago but personal issues came up that did not allow her to have it done. She is here now to consider excision. Review of Systems   Constitutional: Negative for activity change, appetite change and unexpected weight change. HENT: Negative for congestion, nosebleeds, rhinorrhea and sneezing. Eyes: Negative for visual disturbance. Respiratory: Negative for chest tightness and shortness of breath. Cardiovascular: Negative for chest pain and leg swelling. Gastrointestinal: Negative for abdominal distention, abdominal pain, blood in stool, nausea and rectal pain. Endocrine: Negative. Genitourinary: Negative for difficulty urinating. Musculoskeletal: Negative. Skin: Negative for color change. Allergic/Immunologic: Negative. Neurological: Negative for seizures, light-headedness, numbness and headaches. Hematological: Does not bruise/bleed easily. Psychiatric/Behavioral: Negative for sleep disturbance. Past Medical History:   Diagnosis Date    Breast cancer ([facility] [address] 2017    radiation s/p x 20    Hematuria     History of MRI of brain and brain stem 12/2011    normal    Hyperlipidemia     Hypothyroidism     Shingles     Urinary incontinence      Past Surgical History:   Procedure Laterality Date    BLADDER SUSPENSION      COLONOSCOPY  9/15/14,2011    negative, dr Lindsay Norton  2009    negative    FOOT SURGERY  2009    JOINT REPLACEMENT Left     just surgery    KNEE ARTHROSCOPY  2006    MASTECTOMY, PARTIAL Right 2017    right partial mastectomy with SNB    THYROID SURGERY  2000    thyroidectomy-no Ca    TOTAL KNEE ARTHROPLASTY Left 11/02/2016    TUBAL LIGATION  1977     Social History     Tobacco Use    Smoking status: Never Smoker    Smokeless tobacco: Never Used   Vaping Use    Vaping Use: Never used   Substance Use Topics    Alcohol use:  Yes    Drug use: No     Family History   Problem Relation Age of Onset    Cancer Mother         colon cancer    Asthma Sister         emphysemia    Cancer Brother      Patient has no known allergies. No Known Allergies  Current Outpatient Medications   Medication Sig Dispense Refill    levothyroxine (SYNTHROID) 75 MCG tablet TAKE 1 TABLET DAILY 90 tablet 1     No current facility-administered medications for this visit. /70   Temp 97.3 °F (36.3 °C) (Temporal)   Ht 5' 5\" (1.651 m)   Wt 161 lb (73 kg)   LMP 01/10/2005 (Approximate)   BMI 26.79 kg/m²     Objective   Physical Exam  Constitutional:       General: She is not in acute distress. Appearance: Normal appearance. HENT:      Mouth/Throat:      Mouth: Mucous membranes are moist.      Pharynx: Oropharynx is clear. Eyes:      Pupils: Pupils are equal, round, and reactive to light. Neck:      Comments: Neck is supple with out masses, no thyromegaly, trachea midline  Cardiovascular:      Rate and Rhythm: Normal rate. Heart sounds: No murmur heard. Pulmonary:      Effort: Pulmonary effort is normal. No respiratory distress. Breath sounds: Normal breath sounds. Musculoskeletal:      Comments: Normal gait   Lymphadenopathy:      Cervical: No cervical adenopathy. Upper Body:      Right upper body: No supraclavicular or axillary adenopathy. Left upper body: No supraclavicular or axillary adenopathy. Skin:     Findings: No bruising, lesion or rash. Neurological:      Mental Status: She is alert and oriented to person, place, and time. Psychiatric:         Mood and Affect: Mood normal.         Judgment: Judgment normal.       An electronic signature was used to authenticate this note.     --Ángel Huang MD

## 2021-09-16 NOTE — H&P (VIEW-ONLY)
Gideon Litten (:  1950) is a 72 y.o. female,Established patient, here for evaluation of the following chief complaint(s): Other (Ep, lipoma on left shoulder)         ASSESSMENT/PLAN:  Left posterior axillary soft tissue mass that has been enlarging        Excision of left posterior axillary lipoma       The options of therapy were discussed. The procedure of the excision as well as potential risks and complications were discussed including but not exclusive to recurrence, infection, difficulty with wound healing and blood loss. The patient understands and is agreeable to the surgery. The patient was counseled at length about the risks of bridgette Covid-19 in the perioperative period and any recovery window from their procedure. The patient was made aware that bridgette Covid-19  may worsen their prognosis for recovering from their procedure  and lend to a higher morbidity and/or mortality risk. The patient was given the options of postponing their procedure. All material risks, benefits, and alternatives were discussed. The patient does wish to proceed with the procedure at this time. Please note this report has been partially produced using speech recognition software and may cause contain errors related to that system including grammar, punctuation and spelling as well as words and phrases that may seem inappropriate. If there are questions or concerns please feel free to contact me to clarify        Subjective   SUBJECTIVE/OBJECTIVE:  HPI Gideon Litten is a 72 y.o. female seen in follow-up for a soft tissue lesion of the left posterior axillary area. It has been there for almost 3 years and has been getting larger. It is painful in her left shoulder and neck. The patient admits to infection/inflammation. There is not a history of previous surgery at this site. CT scan of her chest on 2019 showed no axillary adenopathy and the soft tissue mass was not seen on CAT scan.   She was scheduled to have it done 2 years ago but personal issues came up that did not allow her to have it done. She is here now to consider excision. Review of Systems   Constitutional: Negative for activity change, appetite change and unexpected weight change. HENT: Negative for congestion, nosebleeds, rhinorrhea and sneezing. Eyes: Negative for visual disturbance. Respiratory: Negative for chest tightness and shortness of breath. Cardiovascular: Negative for chest pain and leg swelling. Gastrointestinal: Negative for abdominal distention, abdominal pain, blood in stool, nausea and rectal pain. Endocrine: Negative. Genitourinary: Negative for difficulty urinating. Musculoskeletal: Negative. Skin: Negative for color change. Allergic/Immunologic: Negative. Neurological: Negative for seizures, light-headedness, numbness and headaches. Hematological: Does not bruise/bleed easily. Psychiatric/Behavioral: Negative for sleep disturbance. Past Medical History:   Diagnosis Date    Breast cancer ([facility] [address] 2017    radiation s/p x 20    Hematuria     History of MRI of brain and brain stem 12/2011    normal    Hyperlipidemia     Hypothyroidism     Shingles     Urinary incontinence      Past Surgical History:   Procedure Laterality Date    BLADDER SUSPENSION      COLONOSCOPY  9/15/14,2011    negative, dr Gavin Tim  2009    negative    FOOT SURGERY  2009    JOINT REPLACEMENT Left     just surgery    KNEE ARTHROSCOPY  2006    MASTECTOMY, PARTIAL Right 2017    right partial mastectomy with SNB    THYROID SURGERY  2000    thyroidectomy-no Ca    TOTAL KNEE ARTHROPLASTY Left 11/02/2016    TUBAL LIGATION  1977     Social History     Tobacco Use    Smoking status: Never Smoker    Smokeless tobacco: Never Used   Vaping Use    Vaping Use: Never used   Substance Use Topics    Alcohol use:  Yes    Drug use: No     Family History   Problem Relation Age of Onset    Cancer Mother         colon cancer    Asthma Sister         emphysemia    Cancer Brother      Patient has no known allergies. No Known Allergies  Current Outpatient Medications   Medication Sig Dispense Refill    levothyroxine (SYNTHROID) 75 MCG tablet TAKE 1 TABLET DAILY 90 tablet 1     No current facility-administered medications for this visit. /70   Temp 97.3 °F (36.3 °C) (Temporal)   Ht 5' 5\" (1.651 m)   Wt 161 lb (73 kg)   LMP 01/10/2005 (Approximate)   BMI 26.79 kg/m²     Objective   Physical Exam  Constitutional:       General: She is not in acute distress. Appearance: Normal appearance. HENT:      Mouth/Throat:      Mouth: Mucous membranes are moist.      Pharynx: Oropharynx is clear. Eyes:      Pupils: Pupils are equal, round, and reactive to light. Neck:      Comments: Neck is supple with out masses, no thyromegaly, trachea midline  Cardiovascular:      Rate and Rhythm: Normal rate. Heart sounds: No murmur heard. Pulmonary:      Effort: Pulmonary effort is normal. No respiratory distress. Breath sounds: Normal breath sounds. Musculoskeletal:      Comments: Normal gait   Lymphadenopathy:      Cervical: No cervical adenopathy. Upper Body:      Right upper body: No supraclavicular or axillary adenopathy. Left upper body: No supraclavicular or axillary adenopathy. Skin:     Findings: No bruising, lesion or rash. Neurological:      Mental Status: She is alert and oriented to person, place, and time. Psychiatric:         Mood and Affect: Mood normal.         Judgment: Judgment normal.       An electronic signature was used to authenticate this note.     --Dede Braxton MD

## 2021-09-22 ENCOUNTER — HOSPITAL ENCOUNTER (OUTPATIENT)
Dept: PREADMISSION TESTING | Age: 71
Discharge: HOME | End: 2021-09-26
Payer: MEDICARE

## 2021-09-22 VITALS
RESPIRATION RATE: 16 BRPM | HEIGHT: 65 IN | BODY MASS INDEX: 26.66 KG/M2 | WEIGHT: 160 LBS | SYSTOLIC BLOOD PRESSURE: 116 MMHG | OXYGEN SATURATION: 98 % | DIASTOLIC BLOOD PRESSURE: 54 MMHG | HEART RATE: 48 BPM | TEMPERATURE: 98.2 F

## 2021-09-22 LAB
HCT VFR BLD CALC: 36.2 % (ref 37–47)
HEMOGLOBIN: 12.4 G/DL (ref 12–16)
MCH RBC QN AUTO: 30.9 PG (ref 27–31.3)
MCHC RBC AUTO-ENTMCNC: 34.1 % (ref 33–37)
MCV RBC AUTO: 90.6 FL (ref 82–100)
PDW BLD-RTO: 13.8 % (ref 11.5–14.5)
PLATELET # BLD: 283 K/UL (ref 130–400)
RBC # BLD: 4 M/UL (ref 4.2–5.4)
WBC # BLD: 5.3 K/UL (ref 4.8–10.8)

## 2021-09-22 PROCEDURE — 85027 COMPLETE CBC AUTOMATED: CPT

## 2021-09-22 PROCEDURE — 93005 ELECTROCARDIOGRAM TRACING: CPT | Performed by: SURGERY

## 2021-09-22 RX ORDER — KETOROLAC TROMETHAMINE 30 MG/ML
30 INJECTION, SOLUTION INTRAMUSCULAR; INTRAVENOUS ONCE
Status: CANCELLED | OUTPATIENT
Start: 2021-09-28 | End: 2021-10-02

## 2021-09-24 LAB
EKG ATRIAL RATE: 49 BPM
EKG P AXIS: 45 DEGREES
EKG P-R INTERVAL: 136 MS
EKG Q-T INTERVAL: 454 MS
EKG QRS DURATION: 76 MS
EKG QTC CALCULATION (BAZETT): 410 MS
EKG R AXIS: 12 DEGREES
EKG T AXIS: 56 DEGREES
EKG VENTRICULAR RATE: 49 BPM

## 2021-09-24 PROCEDURE — 93010 ELECTROCARDIOGRAM REPORT: CPT | Performed by: INTERNAL MEDICINE

## 2021-09-28 ENCOUNTER — HOSPITAL ENCOUNTER (OUTPATIENT)
Age: 71
Setting detail: OUTPATIENT SURGERY
Discharge: HOME | End: 2021-09-28
Attending: SURGERY | Admitting: SURGERY
Payer: MEDICARE

## 2021-09-28 ENCOUNTER — ANESTHESIA EVENT (OUTPATIENT)
Dept: OPERATING ROOM | Age: 71
End: 2021-09-28
Payer: MEDICARE

## 2021-09-28 ENCOUNTER — ANESTHESIA (OUTPATIENT)
Dept: OPERATING ROOM | Age: 71
End: 2021-09-28
Payer: MEDICARE

## 2021-09-28 VITALS — TEMPERATURE: 95.2 F | SYSTOLIC BLOOD PRESSURE: 85 MMHG | OXYGEN SATURATION: 97 % | DIASTOLIC BLOOD PRESSURE: 58 MMHG

## 2021-09-28 VITALS
HEART RATE: 59 BPM | RESPIRATION RATE: 12 BRPM | TEMPERATURE: 97.8 F | DIASTOLIC BLOOD PRESSURE: 60 MMHG | BODY MASS INDEX: 27.31 KG/M2 | WEIGHT: 160 LBS | HEIGHT: 64 IN | OXYGEN SATURATION: 96 % | SYSTOLIC BLOOD PRESSURE: 129 MMHG

## 2021-09-28 DIAGNOSIS — R22.32 AXILLARY MASS, LEFT: Primary | ICD-10-CM

## 2021-09-28 PROCEDURE — 7100000010 HC PHASE II RECOVERY - FIRST 15 MIN: Performed by: SURGERY

## 2021-09-28 PROCEDURE — 3700000001 HC ADD 15 MINUTES (ANESTHESIA): Performed by: SURGERY

## 2021-09-28 PROCEDURE — 2580000003 HC RX 258: Performed by: NURSE ANESTHETIST, CERTIFIED REGISTERED

## 2021-09-28 PROCEDURE — 2500000003 HC RX 250 WO HCPCS: Performed by: STUDENT IN AN ORGANIZED HEALTH CARE EDUCATION/TRAINING PROGRAM

## 2021-09-28 PROCEDURE — 23071 EXC SHOULDER LES SC 3 CM/>: CPT | Performed by: SURGERY

## 2021-09-28 PROCEDURE — 7100000011 HC PHASE II RECOVERY - ADDTL 15 MIN: Performed by: SURGERY

## 2021-09-28 PROCEDURE — 2500000003 HC RX 250 WO HCPCS: Performed by: NURSE ANESTHETIST, CERTIFIED REGISTERED

## 2021-09-28 PROCEDURE — 2580000003 HC RX 258: Performed by: SURGERY

## 2021-09-28 PROCEDURE — 3700000000 HC ANESTHESIA ATTENDED CARE: Performed by: SURGERY

## 2021-09-28 PROCEDURE — 3600000002 HC SURGERY LEVEL 2 BASE: Performed by: SURGERY

## 2021-09-28 PROCEDURE — 88305 TISSUE EXAM BY PATHOLOGIST: CPT

## 2021-09-28 PROCEDURE — 2580000003 HC RX 258: Performed by: STUDENT IN AN ORGANIZED HEALTH CARE EDUCATION/TRAINING PROGRAM

## 2021-09-28 PROCEDURE — 2709999900 HC NON-CHARGEABLE SUPPLY: Performed by: SURGERY

## 2021-09-28 PROCEDURE — 2500000003 HC RX 250 WO HCPCS: Performed by: SURGERY

## 2021-09-28 PROCEDURE — 6360000002 HC RX W HCPCS: Performed by: SURGERY

## 2021-09-28 PROCEDURE — 7100000001 HC PACU RECOVERY - ADDTL 15 MIN: Performed by: SURGERY

## 2021-09-28 PROCEDURE — 6360000002 HC RX W HCPCS: Performed by: NURSE ANESTHETIST, CERTIFIED REGISTERED

## 2021-09-28 PROCEDURE — 7100000000 HC PACU RECOVERY - FIRST 15 MIN: Performed by: SURGERY

## 2021-09-28 PROCEDURE — 3600000012 HC SURGERY LEVEL 2 ADDTL 15MIN: Performed by: SURGERY

## 2021-09-28 RX ORDER — KETOROLAC TROMETHAMINE 15 MG/ML
15 INJECTION, SOLUTION INTRAMUSCULAR; INTRAVENOUS ONCE
Status: COMPLETED | OUTPATIENT
Start: 2021-09-28 | End: 2021-09-28

## 2021-09-28 RX ORDER — ROCURONIUM BROMIDE 10 MG/ML
INJECTION, SOLUTION INTRAVENOUS PRN
Status: DISCONTINUED | OUTPATIENT
Start: 2021-09-28 | End: 2021-09-28 | Stop reason: SDUPTHER

## 2021-09-28 RX ORDER — ONDANSETRON 2 MG/ML
4 INJECTION INTRAMUSCULAR; INTRAVENOUS
Status: DISCONTINUED | OUTPATIENT
Start: 2021-09-28 | End: 2021-09-28 | Stop reason: HOSPADM

## 2021-09-28 RX ORDER — HYDROCODONE BITARTRATE AND ACETAMINOPHEN 5; 325 MG/1; MG/1
1 TABLET ORAL EVERY 6 HOURS PRN
Qty: 15 TABLET | Refills: 0 | Status: SHIPPED | OUTPATIENT
Start: 2021-09-28 | End: 2021-10-03

## 2021-09-28 RX ORDER — SODIUM CHLORIDE, SODIUM LACTATE, POTASSIUM CHLORIDE, CALCIUM CHLORIDE 600; 310; 30; 20 MG/100ML; MG/100ML; MG/100ML; MG/100ML
INJECTION, SOLUTION INTRAVENOUS CONTINUOUS PRN
Status: DISCONTINUED | OUTPATIENT
Start: 2021-09-28 | End: 2021-09-28 | Stop reason: SDUPTHER

## 2021-09-28 RX ORDER — BUPIVACAINE HYDROCHLORIDE 2.5 MG/ML
INJECTION, SOLUTION EPIDURAL; INFILTRATION; INTRACAUDAL PRN
Status: DISCONTINUED | OUTPATIENT
Start: 2021-09-28 | End: 2021-09-28 | Stop reason: ALTCHOICE

## 2021-09-28 RX ORDER — PROMETHAZINE HYDROCHLORIDE 12.5 MG/1
12.5 TABLET ORAL EVERY 6 HOURS PRN
Status: DISCONTINUED | OUTPATIENT
Start: 2021-09-28 | End: 2021-09-28 | Stop reason: HOSPADM

## 2021-09-28 RX ORDER — TRAMADOL HYDROCHLORIDE 50 MG/1
50 TABLET ORAL EVERY 6 HOURS PRN
Status: DISCONTINUED | OUTPATIENT
Start: 2021-09-28 | End: 2021-09-28 | Stop reason: HOSPADM

## 2021-09-28 RX ORDER — SODIUM CHLORIDE 9 MG/ML
25 INJECTION, SOLUTION INTRAVENOUS PRN
Status: DISCONTINUED | OUTPATIENT
Start: 2021-09-28 | End: 2021-09-28 | Stop reason: HOSPADM

## 2021-09-28 RX ORDER — FENTANYL CITRATE 50 UG/ML
INJECTION, SOLUTION INTRAMUSCULAR; INTRAVENOUS PRN
Status: DISCONTINUED | OUTPATIENT
Start: 2021-09-28 | End: 2021-09-28 | Stop reason: SDUPTHER

## 2021-09-28 RX ORDER — MIDAZOLAM HYDROCHLORIDE 2 MG/2ML
INJECTION, SOLUTION INTRAMUSCULAR; INTRAVENOUS PRN
Status: DISCONTINUED | OUTPATIENT
Start: 2021-09-28 | End: 2021-09-28 | Stop reason: SDUPTHER

## 2021-09-28 RX ORDER — HYDROCODONE BITARTRATE AND ACETAMINOPHEN 5; 325 MG/1; MG/1
2 TABLET ORAL PRN
Status: DISCONTINUED | OUTPATIENT
Start: 2021-09-28 | End: 2021-09-28 | Stop reason: HOSPADM

## 2021-09-28 RX ORDER — METOCLOPRAMIDE HYDROCHLORIDE 5 MG/ML
10 INJECTION INTRAMUSCULAR; INTRAVENOUS
Status: DISCONTINUED | OUTPATIENT
Start: 2021-09-28 | End: 2021-09-28 | Stop reason: HOSPADM

## 2021-09-28 RX ORDER — LIDOCAINE HYDROCHLORIDE 10 MG/ML
1 INJECTION, SOLUTION EPIDURAL; INFILTRATION; INTRACAUDAL; PERINEURAL ONCE
Status: DISCONTINUED | OUTPATIENT
Start: 2021-09-28 | End: 2021-09-28 | Stop reason: HOSPADM

## 2021-09-28 RX ORDER — SODIUM CHLORIDE, SODIUM LACTATE, POTASSIUM CHLORIDE, CALCIUM CHLORIDE 600; 310; 30; 20 MG/100ML; MG/100ML; MG/100ML; MG/100ML
INJECTION, SOLUTION INTRAVENOUS CONTINUOUS
Status: DISCONTINUED | OUTPATIENT
Start: 2021-09-28 | End: 2021-09-28 | Stop reason: HOSPADM

## 2021-09-28 RX ORDER — SODIUM CHLORIDE 0.9 % (FLUSH) 0.9 %
10 SYRINGE (ML) INJECTION EVERY 12 HOURS SCHEDULED
Status: DISCONTINUED | OUTPATIENT
Start: 2021-09-28 | End: 2021-09-28 | Stop reason: HOSPADM

## 2021-09-28 RX ORDER — DIPHENHYDRAMINE HYDROCHLORIDE 50 MG/ML
12.5 INJECTION INTRAMUSCULAR; INTRAVENOUS
Status: DISCONTINUED | OUTPATIENT
Start: 2021-09-28 | End: 2021-09-28 | Stop reason: HOSPADM

## 2021-09-28 RX ORDER — LIDOCAINE HYDROCHLORIDE 20 MG/ML
INJECTION, SOLUTION INTRAVENOUS PRN
Status: DISCONTINUED | OUTPATIENT
Start: 2021-09-28 | End: 2021-09-28 | Stop reason: SDUPTHER

## 2021-09-28 RX ORDER — SODIUM CHLORIDE 0.9 % (FLUSH) 0.9 %
10 SYRINGE (ML) INJECTION PRN
Status: DISCONTINUED | OUTPATIENT
Start: 2021-09-28 | End: 2021-09-28 | Stop reason: HOSPADM

## 2021-09-28 RX ORDER — ONDANSETRON 2 MG/ML
4 INJECTION INTRAMUSCULAR; INTRAVENOUS EVERY 6 HOURS PRN
Status: DISCONTINUED | OUTPATIENT
Start: 2021-09-28 | End: 2021-09-28 | Stop reason: HOSPADM

## 2021-09-28 RX ORDER — GLYCOPYRROLATE 1 MG/5 ML
SYRINGE (ML) INTRAVENOUS PRN
Status: DISCONTINUED | OUTPATIENT
Start: 2021-09-28 | End: 2021-09-28 | Stop reason: SDUPTHER

## 2021-09-28 RX ORDER — FENTANYL CITRATE 50 UG/ML
50 INJECTION, SOLUTION INTRAMUSCULAR; INTRAVENOUS EVERY 10 MIN PRN
Status: DISCONTINUED | OUTPATIENT
Start: 2021-09-28 | End: 2021-09-28 | Stop reason: HOSPADM

## 2021-09-28 RX ORDER — MAGNESIUM HYDROXIDE 1200 MG/15ML
LIQUID ORAL CONTINUOUS PRN
Status: COMPLETED | OUTPATIENT
Start: 2021-09-28 | End: 2021-09-28

## 2021-09-28 RX ORDER — ONDANSETRON 2 MG/ML
INJECTION INTRAMUSCULAR; INTRAVENOUS PRN
Status: DISCONTINUED | OUTPATIENT
Start: 2021-09-28 | End: 2021-09-28 | Stop reason: SDUPTHER

## 2021-09-28 RX ORDER — LIDOCAINE HYDROCHLORIDE 10 MG/ML
1 INJECTION, SOLUTION EPIDURAL; INFILTRATION; INTRACAUDAL; PERINEURAL
Status: COMPLETED | OUTPATIENT
Start: 2021-09-28 | End: 2021-09-28

## 2021-09-28 RX ORDER — HYDROCODONE BITARTRATE AND ACETAMINOPHEN 5; 325 MG/1; MG/1
1 TABLET ORAL PRN
Status: DISCONTINUED | OUTPATIENT
Start: 2021-09-28 | End: 2021-09-28 | Stop reason: HOSPADM

## 2021-09-28 RX ORDER — MEPERIDINE HYDROCHLORIDE 25 MG/ML
12.5 INJECTION INTRAMUSCULAR; INTRAVENOUS; SUBCUTANEOUS EVERY 5 MIN PRN
Status: DISCONTINUED | OUTPATIENT
Start: 2021-09-28 | End: 2021-09-28 | Stop reason: HOSPADM

## 2021-09-28 RX ORDER — DEXAMETHASONE SODIUM PHOSPHATE 4 MG/ML
INJECTION, SOLUTION INTRA-ARTICULAR; INTRALESIONAL; INTRAMUSCULAR; INTRAVENOUS; SOFT TISSUE PRN
Status: DISCONTINUED | OUTPATIENT
Start: 2021-09-28 | End: 2021-09-28 | Stop reason: SDUPTHER

## 2021-09-28 RX ORDER — MORPHINE SULFATE 2 MG/ML
1 INJECTION, SOLUTION INTRAMUSCULAR; INTRAVENOUS
Status: DISCONTINUED | OUTPATIENT
Start: 2021-09-28 | End: 2021-09-28 | Stop reason: HOSPADM

## 2021-09-28 RX ORDER — PROPOFOL 10 MG/ML
INJECTION, EMULSION INTRAVENOUS PRN
Status: DISCONTINUED | OUTPATIENT
Start: 2021-09-28 | End: 2021-09-28 | Stop reason: SDUPTHER

## 2021-09-28 RX ADMIN — CEFAZOLIN 2000 MG: 10 INJECTION, POWDER, FOR SOLUTION INTRAVENOUS at 07:45

## 2021-09-28 RX ADMIN — PROPOFOL 150 MG: 10 INJECTION, EMULSION INTRAVENOUS at 07:39

## 2021-09-28 RX ADMIN — ROCURONIUM BROMIDE 50 MG: 10 INJECTION INTRAVENOUS at 07:39

## 2021-09-28 RX ADMIN — SODIUM CHLORIDE, POTASSIUM CHLORIDE, SODIUM LACTATE AND CALCIUM CHLORIDE: 600; 310; 30; 20 INJECTION, SOLUTION INTRAVENOUS at 06:27

## 2021-09-28 RX ADMIN — MIDAZOLAM HYDROCHLORIDE 2 MG: 1 INJECTION, SOLUTION INTRAMUSCULAR; INTRAVENOUS at 07:35

## 2021-09-28 RX ADMIN — LIDOCAINE HYDROCHLORIDE 1 ML: 10 INJECTION, SOLUTION EPIDURAL; INFILTRATION; INTRACAUDAL; PERINEURAL at 06:27

## 2021-09-28 RX ADMIN — FENTANYL CITRATE 50 MCG: 50 INJECTION, SOLUTION INTRAMUSCULAR; INTRAVENOUS at 07:39

## 2021-09-28 RX ADMIN — Medication 0.2 MG: at 08:02

## 2021-09-28 RX ADMIN — DEXAMETHASONE SODIUM PHOSPHATE 4 MG: 4 INJECTION, SOLUTION INTRAMUSCULAR; INTRAVENOUS at 07:55

## 2021-09-28 RX ADMIN — PHENYLEPHRINE HYDROCHLORIDE 100 MCG: 10 INJECTION INTRAVENOUS at 08:02

## 2021-09-28 RX ADMIN — SODIUM CHLORIDE, POTASSIUM CHLORIDE, SODIUM LACTATE AND CALCIUM CHLORIDE: 600; 310; 30; 20 INJECTION, SOLUTION INTRAVENOUS at 07:35

## 2021-09-28 RX ADMIN — KETOROLAC TROMETHAMINE 15 MG: 15 INJECTION, SOLUTION INTRAMUSCULAR; INTRAVENOUS at 06:28

## 2021-09-28 RX ADMIN — FENTANYL CITRATE 50 MCG: 50 INJECTION, SOLUTION INTRAMUSCULAR; INTRAVENOUS at 08:10

## 2021-09-28 RX ADMIN — SODIUM CHLORIDE, POTASSIUM CHLORIDE, SODIUM LACTATE AND CALCIUM CHLORIDE 1000 ML: 600; 310; 30; 20 INJECTION, SOLUTION INTRAVENOUS at 08:56

## 2021-09-28 RX ADMIN — ONDANSETRON 4 MG: 2 INJECTION INTRAMUSCULAR; INTRAVENOUS at 07:55

## 2021-09-28 RX ADMIN — LIDOCAINE HYDROCHLORIDE 100 MG: 20 INJECTION, SOLUTION INTRAVENOUS at 07:39

## 2021-09-28 RX ADMIN — SUGAMMADEX 200 MG: 100 INJECTION, SOLUTION INTRAVENOUS at 08:25

## 2021-09-28 ASSESSMENT — PULMONARY FUNCTION TESTS
PIF_VALUE: 16
PIF_VALUE: 16
PIF_VALUE: 12
PIF_VALUE: 1
PIF_VALUE: 16
PIF_VALUE: 15
PIF_VALUE: 13
PIF_VALUE: 4
PIF_VALUE: 1
PIF_VALUE: 15
PIF_VALUE: 15
PIF_VALUE: 16
PIF_VALUE: 15
PIF_VALUE: 17
PIF_VALUE: 28
PIF_VALUE: 11
PIF_VALUE: 16
PIF_VALUE: 32
PIF_VALUE: 16
PIF_VALUE: 16
PIF_VALUE: 15
PIF_VALUE: 16
PIF_VALUE: 12
PIF_VALUE: 19
PIF_VALUE: 15
PIF_VALUE: 16
PIF_VALUE: 15
PIF_VALUE: 15
PIF_VALUE: 21
PIF_VALUE: 16
PIF_VALUE: 15
PIF_VALUE: 16
PIF_VALUE: 2
PIF_VALUE: 16
PIF_VALUE: 16
PIF_VALUE: 15
PIF_VALUE: 13
PIF_VALUE: 15
PIF_VALUE: 15
PIF_VALUE: 16
PIF_VALUE: 15
PIF_VALUE: 16
PIF_VALUE: 0
PIF_VALUE: 16
PIF_VALUE: 0
PIF_VALUE: 16
PIF_VALUE: 0

## 2021-09-28 ASSESSMENT — PAIN SCALES - GENERAL: PAINLEVEL_OUTOF10: 0

## 2021-09-28 NOTE — ANESTHESIA PRE PROCEDURE
Department of Anesthesiology  Preprocedure Note       Name:  Carolin Javier   Age:  72 y.o.  :  1950                                          MRN:  [id number]         Date:  2021      Surgeon: Raiza Mack):  Fernanda Zhao MD    Procedure: Procedure(s):  EXCISION  OF LEFT AXILLARY MASS. 2 HOURS,    Medications prior to admission:   Prior to Admission medications    Medication Sig Start Date End Date Taking?  Authorizing Provider   levothyroxine (SYNTHROID) 75 MCG tablet TAKE 1 TABLET DAILY 18  Yes Ortega Malave MD       Current medications:    Current Facility-Administered Medications   Medication Dose Route Frequency Provider Last Rate Last Admin    ketorolac (TORADOL) injection 15 mg  15 mg IntraVENous Once Fernanda Zhao MD        ceFAZolin (ANCEF) 2000 mg in dextrose 5 % 100 mL IVPB  2,000 mg IntraVENous Once Fernanda Zhao MD        lidocaine PF 1 % injection 1 mL  1 mL IntraDERmal Once Tilman Rubins, DO        lactated ringers infusion   IntraVENous Continuous Tilman Rubins, DO           Allergies:  No Known Allergies    Problem List:    Patient Active Problem List   Diagnosis Code    Hypothyroidism E03.9    Postmenopausal Z78.0    Osteoarthritis M19.90    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    Axillary mass, left R22.32       Past Medical History:        Diagnosis Date    Breast cancer ([address] 2017    radiation s/p x 20    Hematuria     History of MRI of brain and brain stem 2011    normal    Hyperlipidemia     Hypothyroidism     Shingles     Urinary incontinence        Past Surgical History:        Procedure Laterality Date    BLADDER SUSPENSION      COLONOSCOPY  9/15/14,2011    negative, dr Chapis King    CYSTOSCOPY  2009    negative    FOOT SURGERY  2009    JOINT REPLACEMENT Left     just surgery    KNEE ARTHROSCOPY  2006    MASTECTOMY, PARTIAL Right 2017    right partial mastectomy with SNB    THYROID SURGERY  2000    thyroidectomy-no Ca    TOTAL KNEE ARTHROPLASTY Left 11/02/2016    TUBAL LIGATION  1977       Social History:    Social History     Tobacco Use    Smoking status: Never Smoker    Smokeless tobacco: Never Used   Substance Use Topics    Alcohol use: Yes                                Counseling given: Not Answered      Vital Signs (Current):   Vitals:    09/28/21 0545   BP: (!) 110/59   Pulse: 62   Resp: 16   Temp: 98.4 °F (36.9 °C)   TempSrc: Temporal   SpO2: 96%   Weight: 160 lb (72.6 kg)   Height: 5' 4\" (1.626 m)                                              BP Readings from Last 3 Encounters:   09/28/21 (!) 110/59   09/22/21 (!) 116/54   09/15/21 118/70       NPO Status: Time of last liquid consumption: 2000                        Time of last solid consumption: 2000                        Date of last liquid consumption: 09/27/21                        Date of last solid food consumption: 09/27/21    BMI:   Wt Readings from Last 3 Encounters:   09/28/21 160 lb (72.6 kg)   09/22/21 160 lb (72.6 kg)   09/15/21 161 lb (73 kg)     Body mass index is 27.46 kg/m². CBC:   Lab Results   Component Value Date    WBC 5.3 09/22/2021    RBC 4.00 09/22/2021    RBC 3.94 01/10/2012    HGB 12.4 09/22/2021    HCT 36.2 09/22/2021    MCV 90.6 09/22/2021    RDW 13.8 09/22/2021     09/22/2021       CMP:   Lab Results   Component Value Date     07/06/2018    K 4.9 07/06/2018     07/06/2018    CO2 25 07/06/2018    BUN 20 07/06/2018    CREATININE 0.81 07/06/2018    GFRAA >60.0 07/06/2018    LABGLOM >60.0 07/06/2018    GLUCOSE 97 07/06/2018    GLUCOSE 114 01/10/2012    PROT 7.7 07/06/2018    CALCIUM 9.9 07/06/2018    BILITOT 0.3 07/06/2018    ALKPHOS 115 07/06/2018    AST 22 07/06/2018    ALT 16 07/06/2018       POC Tests: No results for input(s): POCGLU, POCNA, POCK, POCCL, POCBUN, POCHEMO, POCHCT in the last 72 hours.     Coags:   Lab Results   Component Value Date    PROTIME 10.0 12/01/2015 INR 0.9 12/01/2015       HCG (If Applicable): No results found for: PREGTESTUR, PREGSERUM, HCG, HCGQUANT     ABGs: No results found for: PHART, PO2ART, MID1INB, OLJ8FKI, BEART, D5WZEOYO     Type & Screen (If Applicable):  No results found for: LABABO, LABRH    Drug/Infectious Status (If Applicable):  No results found for: HIV, HEPCAB    COVID-19 Screening (If Applicable): No results found for: COVID19        Anesthesia Evaluation  Patient summary reviewed and Nursing notes reviewed no history of anesthetic complications:   Airway: Mallampati: II  TM distance: >3 FB   Neck ROM: full  Mouth opening: > = 3 FB Dental: normal exam         Pulmonary:Negative Pulmonary ROS and normal exam                               Cardiovascular:Negative CV ROS  Exercise tolerance: good (>4 METS),         ECG reviewed               Beta Blocker:  Not on Beta Blocker      ROS comment: Sinus bradycardia  Otherwise normal ECG  No previous ECGs available     Neuro/Psych:   Negative Neuro/Psych ROS              GI/Hepatic/Renal: Neg GI/Hepatic/Renal ROS            Endo/Other:    (+) hypothyroidism: arthritis:., .          Pt had PAT visit. Abdominal:             Vascular: negative vascular ROS. Other Findings:             Anesthesia Plan      general     ASA 2     (ETT)  Induction: intravenous. MIPS: Postoperative opioids intended and Prophylactic antiemetics administered. Anesthetic plan and risks discussed with patient. Plan discussed with CRNA.     Attending anesthesiologist reviewed and agrees with Dominic Bryson DO   9/28/2021

## 2021-09-28 NOTE — ANESTHESIA POSTPROCEDURE EVALUATION
Department of Anesthesiology  Postprocedure Note    Patient: Deepthi Forman  MRN: [id number]  YOB: 1950  Date of evaluation: 9/28/2021  Time:  8:36 AM     Procedure Summary     Date: 09/28/21 Room / Location: [address]    Anesthesia Start: 2789 Anesthesia Stop: 8963    Procedure: EXCISION  OF LEFT AXILLARY MASS (Left ) Diagnosis: (LEFT AXILLARY MASS)    Surgeons: Ezekiel Schwartz MD Responsible Provider: Mateusz Vargas DO    Anesthesia Type: general ASA Status: 2          Anesthesia Type: general    Alberta Phase I:      Alberta Phase II:      Last vitals: Reviewed and per EMR flowsheets.        Anesthesia Post Evaluation    Patient location during evaluation: PACU  Patient participation: complete - patient participated  Level of consciousness: awake  Pain score: 0  Airway patency: patent  Nausea & Vomiting: no nausea and no vomiting  Complications: no  Cardiovascular status: blood pressure returned to baseline and hemodynamically stable  Respiratory status: acceptable and nasal cannula  Hydration status: stable

## 2021-09-28 NOTE — BRIEF OP NOTE
Brief Postoperative Note      Patient: Lucy Bartholomew  YOB: 1950  MRN: [id number]    Date of Procedure: 9/28/2021    Pre-Op Diagnosis: LEFT AXILLARY/POSTERIOR SHOULDER SOFT TISSUE MASS     Post-Op Diagnosis: Same       Procedure: Excision of left axillary/posterior shoulder soft tissue mass  Surgeon(s):  Katharina Stubbs MD    Assistant:  First Assistant: Nahed Terry    Anesthesia: General with local    Estimated Blood Loss (mL): Minimal    Complications: None    Specimens:   ID Type Source Tests Collected by Time Destination   A : left axillary soft tissue mass Tissue Back SURGICAL PATHOLOGY Katharina Stubbs MD 9/28/2021 0813        Implants:  * No implants in log *      Drains: * No LDAs found *    Findings: 6 cm posterior axillary/posterior shoulder soft tissue mass    Electronically signed by Katharina Stubbs MD on 9/28/2021 at 8:24 AM

## 2021-09-28 NOTE — INTERVAL H&P NOTE
Update History & Physical    The patient's History and Physical of September 15, 2021 was reviewed with the patient and I examined the patient. There was no change. The surgical site was confirmed by the patient and me. Plan: The risks, benefits, expected outcome, and alternative to the recommended procedure have been discussed with the patient. Patient understands and wants to proceed with the procedure.      Electronically signed by Julissa Garland MD on 9/28/2021 at 7:36 AM

## 2021-09-28 NOTE — OP NOTE
[address]                      [address], [address] [facility]                                OPERATIVE REPORT    PATIENT NAME: Adilene Sam                    :        1950  MED REC NO:   [id number]                            ROOM:  ACCOUNT NO:   [de-identified]                           ADMIT DATE: 2021  PROVIDER:     Eliazar Jarquin MD    DATE OF PROCEDURE:  2021    LOCATION:  [facility] [address]. PREOPERATIVE DIAGNOSIS:  Left posterior shoulder/axillary soft tissue  mass. POSTOPERATIVE DIAGNOSIS:  Left posterior shoulder/axillary soft tissue  mass. PROCEDURE:  Excision of left axillary/posterior shoulder soft tissue  mass 6 cm. SURGEON:  Eliazar Jarquin MD    ANESTHESIA:  General with local.    COMPLICATIONS:  None. ESTIMATED BLOOD LOSS:  Minimal.    HISTORY:  The patient is a 67-year-old female who has a left posterior  shoulder axillary soft tissue mass that has been there for several  years. It is progressively getting larger. After discussing with her  the options of therapy, she was agreeable to excision. The procedure as  well as potential risks and complications including, but not exclusive  to infection, blood loss, damage to surrounding structures, recurrence,  and chronic pain were all discussed. She understood if any of these  occur could result needing further surgery. She was agreeable to the  procedure. OPERATIVE PROCEDURE:  The patient brought in the operative suite, placed  in the supine position where anesthesia was induced and she was  intubated. She was then placed in the right lateral decubitus position  and padded appropriately. The left posterior shoulder axillary area was  then prepped and draped in a sterile fashion. There was a 6-cm mass  palpable in this region. After prepping and draping in a sterile  fashion, time-out called, the patient and procedure were properly  identified.   The area was then infiltrated with 10 mL of 0.25% Marcaine  plain. A transverse incision was then made directly over the mass and  carried down to the subcutaneous tissue where the mass was removed in  its entirety. The mass was contained within the subcutaneous tissue and  was not well encapsulated. After removing the mass down to the level of  the fascia, irrigation was done. There was excellent hemostasis. Sponge, needle, and instrument counts were correct and closure was done  using 2-0 Vicryl, 3-0 Vicryl and 4-0 Monocryl with skin glue on the skin  edges and an Aquacel dressing on the wound. She tolerated the procedure  well, went to recovery in stable condition and I spoke with her son by  phone afterwards.         Jess Blanchard MD    D: 09/28/2021 8:38:30       T: 09/28/2021 8:41:50     [email or web address]  Job#: [id number]     Doc#: [id number]    CC:

## 2021-09-28 NOTE — PROGRESS NOTES
Renal Adjustment Per Protocol: Toradol 30mg IV preop changed to Toradol 15mg IV preop based on max dosing recommendation for patients 65 years and older. Cecy Webre, JORGE. Henrique.  9/28/2021  5:32 AM

## 2021-10-04 ENCOUNTER — OFFICE VISIT (OUTPATIENT)
Dept: SURGERY | Age: 71
End: 2021-10-04

## 2021-10-04 VITALS
WEIGHT: 160 LBS | BODY MASS INDEX: 26.66 KG/M2 | HEIGHT: 65 IN | SYSTOLIC BLOOD PRESSURE: 108 MMHG | DIASTOLIC BLOOD PRESSURE: 70 MMHG | TEMPERATURE: 98.5 F

## 2021-10-04 DIAGNOSIS — R22.32 AXILLARY MASS, LEFT: Primary | ICD-10-CM

## 2021-10-04 PROCEDURE — 99024 POSTOP FOLLOW-UP VISIT: CPT | Performed by: SURGERY

## 2021-10-04 NOTE — PROGRESS NOTES
Marino Dow (:  1950) is a 72 y.o. female,Established patient, here for evaluation of the following chief complaint(s):  Post-Op Check (post op EXC left axillary mass)         ASSESSMENT/PLAN:  Doing well        The patient is instructed to return to see me in 1 month. Subjective   SUBJECTIVE/OBJECTIVE:  HPI  Marino Dow is her for a post operartive visit after a left axillary lipoma removal on . Pain is rated as a  0. Pathology was a lipoma. The patient denies drainage. He patient is back to normal daily activities. She denies any drainage. Review of Systems       Objective   Physical Exam  Constitutional:       General: She is not in acute distress. Appearance: Normal appearance. HENT:      Mouth/Throat:      Mouth: Mucous membranes are moist.      Pharynx: Oropharynx is clear. Eyes:      Pupils: Pupils are equal, round, and reactive to light. Neck:      Comments: Neck is supple with out masses, no thyromegaly, trachea midline  Pulmonary:       Musculoskeletal:      Comments: Normal gait   Skin:     Findings: No bruising, lesion or rash. Neurological:      Mental Status: She is alert and oriented to person, place, and time. Psychiatric:         Mood and Affect: Mood normal.         Judgment: Judgment normal.         /70   Temp 98.5 °F (36.9 °C) (Temporal)   Ht 5' 5\" (1.651 m)   Wt 160 lb (72.6 kg)   LMP 01/10/2005 (Approximate)   BMI 26.63 kg/m²           An electronic signature was used to authenticate this note.     --Aravind Ortiz MD PT Evaluation     Today's date: 2018  Patient name: Smooth Crawford  : 1995  MRN: [id number]  Referring provider: MADDIE Mcclellan  Dx:   Encounter Diagnosis     ICD-10-CM    1  Chronic pain of right ankle M25 571 Ambulatory referral to Physical Therapy    G89 29                   Assessment  Impairments: abnormal coordination, abnormal muscle firing, abnormal muscle tone, abnormal or restricted ROM, abnormal movement, activity intolerance, difficulty understanding, impaired physical strength, lacks appropriate home exercise program and pain with function    Assessment details:    Patient referred to PT for R ankle pain fro ma strain  About a year ago  todays findings are consistent with the diagnosis  They have had no prior treatment  Patient is having the most difficulty with  With exercises and prolonged standing  and IADLs  today we reviewed the findings and discussed a treatment plan including a HEP and follow up visits to improve strength, ROM and function  Understanding of Dx/Px/POC: good   Prognosis: good    Goals  Patient will be independent with home exercise program    Patient will be able to manage symptoms independently   Short Term:  Pt will report decreased levels of pain by at least 2 subjective ratings in 4 weeks  Pt will demonstrate improved ROM by at least 10 degrees in 4 weeks  Pt will demonstrate improved strength by 1/2 grade  Long Term:   Pt will be independent in their HEP in 8 weeks  Pt will be be pain free with IADL's  Pt will demonstrate improved FOTO, > 65     Plan  Patient would benefit from: skilled PT  Referral necessary: No  Planned modality interventions: thermotherapy: hydrocollator packs  Planned therapy interventions: abdominal trunk stabilization, activity modification, joint mobilization, manual therapy, motor coordination training, neuromuscular re-education, patient education, self care, therapeutic activities, therapeutic exercise, graded activity, home exercise program and behavior modification  Frequency: 2x week  Duration in weeks: 9  Treatment plan discussed with: patient        Subjective    Objective     General Comments     Ankle/Foot Comments   Ankle:   anterior draw =  -     talar tilt=  -     tinel sign=  -    functional squat test = early heel rise    foot in standing= pes planus   loading first ray= pain in the R   squeeze test= -cotton test=-    TTP ant ankle and along the ant tib tendon     squating early heel rise             Precautions: none   Daily Treatment Diary     Manual              Laser ant ankle              PRON Df stretch                                                         Exercise Diary              Tb  PF/DF              Bik              Plantar F stretch              Mini squat                                                                                                                                                                                                                                  Modalities

## 2021-11-01 ENCOUNTER — OFFICE VISIT (OUTPATIENT)
Dept: SURGERY | Age: 71
End: 2021-11-01

## 2021-11-01 VITALS
DIASTOLIC BLOOD PRESSURE: 74 MMHG | TEMPERATURE: 98.9 F | BODY MASS INDEX: 26.66 KG/M2 | HEIGHT: 65 IN | SYSTOLIC BLOOD PRESSURE: 116 MMHG | WEIGHT: 160 LBS

## 2021-11-01 DIAGNOSIS — R22.32 AXILLARY MASS, LEFT: Primary | ICD-10-CM

## 2021-11-01 PROCEDURE — 99024 POSTOP FOLLOW-UP VISIT: CPT | Performed by: SURGERY

## 2022-04-11 ENCOUNTER — HOSPITAL ENCOUNTER (OUTPATIENT)
Dept: PREADMISSION TESTING | Age: 72
Discharge: HOME | End: 2022-04-15
Payer: MEDICARE

## 2022-04-11 VITALS
DIASTOLIC BLOOD PRESSURE: 52 MMHG | TEMPERATURE: 97.1 F | OXYGEN SATURATION: 99 % | HEIGHT: 65 IN | HEART RATE: 54 BPM | BODY MASS INDEX: 27.69 KG/M2 | WEIGHT: 166.2 LBS | RESPIRATION RATE: 16 BRPM | SYSTOLIC BLOOD PRESSURE: 108 MMHG

## 2022-04-11 LAB
ABO/RH: NORMAL
ALBUMIN SERPL-MCNC: 3.7 G/DL (ref 3.5–4.6)
ALP BLD-CCNC: 111 U/L (ref 40–130)
ALT SERPL-CCNC: 13 U/L (ref 0–33)
ANION GAP SERPL CALCULATED.3IONS-SCNC: 9 MEQ/L (ref 9–15)
ANTIBODY SCREEN: NORMAL
APTT: 29.1 SEC (ref 24.4–36.8)
AST SERPL-CCNC: 18 U/L (ref 0–35)
BACTERIA: NEGATIVE /HPF
BASOPHILS ABSOLUTE: 0 K/UL (ref 0–0.2)
BASOPHILS RELATIVE PERCENT: 0.7 %
BILIRUB SERPL-MCNC: <0.2 MG/DL (ref 0.2–0.7)
BILIRUBIN URINE: NEGATIVE
BLOOD, URINE: ABNORMAL
BUN BLDV-MCNC: 13 MG/DL (ref 8–23)
CALCIUM SERPL-MCNC: 9 MG/DL (ref 8.5–9.9)
CHLORIDE BLD-SCNC: 100 MEQ/L (ref 95–107)
CLARITY: CLEAR
CO2: 26 MEQ/L (ref 20–31)
COLOR: YELLOW
CREAT SERPL-MCNC: 0.78 MG/DL (ref 0.5–0.9)
EOSINOPHILS ABSOLUTE: 0.5 K/UL (ref 0–0.7)
EOSINOPHILS RELATIVE PERCENT: 9.5 %
EPITHELIAL CELLS, UA: ABNORMAL /HPF (ref 0–5)
GFR AFRICAN AMERICAN: >60
GFR NON-AFRICAN AMERICAN: >60
GLOBULIN: 2.9 G/DL (ref 2.3–3.5)
GLUCOSE BLD-MCNC: 77 MG/DL (ref 70–99)
GLUCOSE URINE: NEGATIVE MG/DL
HCT VFR BLD CALC: 36.6 % (ref 37–47)
HEMOGLOBIN: 12.1 G/DL (ref 12–16)
HYALINE CASTS: ABNORMAL /HPF (ref 0–5)
INR BLD: 1
KETONES, URINE: NEGATIVE MG/DL
LEUKOCYTE ESTERASE, URINE: ABNORMAL
LYMPHOCYTES ABSOLUTE: 1.3 K/UL (ref 1–4.8)
LYMPHOCYTES RELATIVE PERCENT: 26.6 %
MCH RBC QN AUTO: 30.2 PG (ref 27–31.3)
MCHC RBC AUTO-ENTMCNC: 33.1 % (ref 33–37)
MCV RBC AUTO: 91.3 FL (ref 82–100)
MONOCYTES ABSOLUTE: 0.7 K/UL (ref 0.2–0.8)
MONOCYTES RELATIVE PERCENT: 14.3 %
NEUTROPHILS ABSOLUTE: 2.4 K/UL (ref 1.4–6.5)
NEUTROPHILS RELATIVE PERCENT: 48.9 %
NITRITE, URINE: NEGATIVE
PDW BLD-RTO: 14 % (ref 11.5–14.5)
PH UA: 5 (ref 5–9)
PLATELET # BLD: 254 K/UL (ref 130–400)
POTASSIUM SERPL-SCNC: 4 MEQ/L (ref 3.4–4.9)
PROTEIN UA: NEGATIVE MG/DL
PROTHROMBIN TIME: 13 SEC (ref 12.3–14.9)
RBC # BLD: 4.01 M/UL (ref 4.2–5.4)
RBC UA: ABNORMAL /HPF (ref 0–5)
SODIUM BLD-SCNC: 135 MEQ/L (ref 135–144)
SPECIFIC GRAVITY UA: 1.02 (ref 1–1.03)
TOTAL PROTEIN: 6.6 G/DL (ref 6.3–8)
URINE REFLEX TO CULTURE: ABNORMAL
UROBILINOGEN, URINE: 0.2 E.U./DL
WBC # BLD: 4.9 K/UL (ref 4.8–10.8)
WBC UA: ABNORMAL /HPF (ref 0–5)

## 2022-04-11 PROCEDURE — 87081 CULTURE SCREEN ONLY: CPT

## 2022-04-11 PROCEDURE — 86403 PARTICLE AGGLUT ANTBDY SCRN: CPT

## 2022-04-11 PROCEDURE — 80053 COMPREHEN METABOLIC PANEL: CPT

## 2022-04-11 PROCEDURE — 81001 URINALYSIS AUTO W/SCOPE: CPT

## 2022-04-11 PROCEDURE — 85730 THROMBOPLASTIN TIME PARTIAL: CPT

## 2022-04-11 PROCEDURE — 85025 COMPLETE CBC W/AUTO DIFF WBC: CPT

## 2022-04-11 PROCEDURE — 85610 PROTHROMBIN TIME: CPT

## 2022-04-11 PROCEDURE — 86900 BLOOD TYPING SEROLOGIC ABO: CPT

## 2022-04-11 PROCEDURE — 93005 ELECTROCARDIOGRAM TRACING: CPT | Performed by: ORTHOPAEDIC SURGERY

## 2022-04-11 PROCEDURE — 86901 BLOOD TYPING SEROLOGIC RH(D): CPT

## 2022-04-11 PROCEDURE — 86850 RBC ANTIBODY SCREEN: CPT

## 2022-04-11 RX ORDER — SODIUM CHLORIDE 0.9 % (FLUSH) 0.9 %
10 SYRINGE (ML) INJECTION PRN
Status: CANCELLED | OUTPATIENT
Start: 2022-04-18

## 2022-04-11 RX ORDER — SODIUM CHLORIDE 9 MG/ML
25 INJECTION, SOLUTION INTRAVENOUS PRN
Status: CANCELLED | OUTPATIENT
Start: 2022-04-18

## 2022-04-11 RX ORDER — LIDOCAINE HYDROCHLORIDE 10 MG/ML
1 INJECTION, SOLUTION EPIDURAL; INFILTRATION; INTRACAUDAL; PERINEURAL
Status: CANCELLED | OUTPATIENT
Start: 2022-04-18 | End: 2022-04-18

## 2022-04-11 RX ORDER — SODIUM CHLORIDE 0.9 % (FLUSH) 0.9 %
10 SYRINGE (ML) INJECTION EVERY 12 HOURS SCHEDULED
Status: CANCELLED | OUTPATIENT
Start: 2022-04-18

## 2022-04-11 RX ORDER — SODIUM CHLORIDE, SODIUM LACTATE, POTASSIUM CHLORIDE, CALCIUM CHLORIDE 600; 310; 30; 20 MG/100ML; MG/100ML; MG/100ML; MG/100ML
INJECTION, SOLUTION INTRAVENOUS CONTINUOUS
Status: CANCELLED | OUTPATIENT
Start: 2022-04-18

## 2022-04-12 LAB
EKG ATRIAL RATE: 59 BPM
EKG P AXIS: 24 DEGREES
EKG P-R INTERVAL: 130 MS
EKG Q-T INTERVAL: 428 MS
EKG QRS DURATION: 76 MS
EKG QTC CALCULATION (BAZETT): 423 MS
EKG R AXIS: 22 DEGREES
EKG T AXIS: 47 DEGREES
EKG VENTRICULAR RATE: 59 BPM

## 2022-04-13 LAB — MRSA CULTURE ONLY: NORMAL

## 2022-04-15 ENCOUNTER — ANESTHESIA EVENT (OUTPATIENT)
Dept: OPERATING ROOM | Age: 72
End: 2022-04-15
Payer: MEDICARE

## 2022-04-18 ENCOUNTER — ANESTHESIA (OUTPATIENT)
Dept: OPERATING ROOM | Age: 72
End: 2022-04-18
Payer: MEDICARE

## 2022-04-18 ENCOUNTER — HOSPITAL ENCOUNTER (OUTPATIENT)
Age: 72
Discharge: HOME | End: 2022-04-20
Attending: ORTHOPAEDIC SURGERY | Admitting: ORTHOPAEDIC SURGERY
Payer: MEDICARE

## 2022-04-18 VITALS — SYSTOLIC BLOOD PRESSURE: 78 MMHG | DIASTOLIC BLOOD PRESSURE: 43 MMHG | OXYGEN SATURATION: 99 %

## 2022-04-18 DIAGNOSIS — Z96.659 STATUS POST TOTAL KNEE REPLACEMENT, UNSPECIFIED LATERALITY: Primary | ICD-10-CM

## 2022-04-18 PROBLEM — Z96.651 STATUS POST TOTAL KNEE REPLACEMENT, RIGHT: Status: ACTIVE | Noted: 2022-04-18

## 2022-04-18 PROCEDURE — 97166 OT EVAL MOD COMPLEX 45 MIN: CPT

## 2022-04-18 PROCEDURE — 7100000000 HC PACU RECOVERY - FIRST 15 MIN: Performed by: ORTHOPAEDIC SURGERY

## 2022-04-18 PROCEDURE — 6360000002 HC RX W HCPCS: Performed by: NURSE PRACTITIONER

## 2022-04-18 PROCEDURE — 94150 VITAL CAPACITY TEST: CPT

## 2022-04-18 PROCEDURE — 3600000014 HC SURGERY LEVEL 4 ADDTL 15MIN: Performed by: ORTHOPAEDIC SURGERY

## 2022-04-18 PROCEDURE — 2580000003 HC RX 258: Performed by: ORTHOPAEDIC SURGERY

## 2022-04-18 PROCEDURE — 6360000002 HC RX W HCPCS: Performed by: ORTHOPAEDIC SURGERY

## 2022-04-18 PROCEDURE — 2720000010 HC SURG SUPPLY STERILE: Performed by: ORTHOPAEDIC SURGERY

## 2022-04-18 PROCEDURE — C1776 JOINT DEVICE (IMPLANTABLE): HCPCS | Performed by: ORTHOPAEDIC SURGERY

## 2022-04-18 PROCEDURE — 6370000000 HC RX 637 (ALT 250 FOR IP): Performed by: NURSE PRACTITIONER

## 2022-04-18 PROCEDURE — C1713 ANCHOR/SCREW BN/BN,TIS/BN: HCPCS | Performed by: ORTHOPAEDIC SURGERY

## 2022-04-18 PROCEDURE — 64447 NJX AA&/STRD FEMORAL NRV IMG: CPT | Performed by: ANESTHESIOLOGY

## 2022-04-18 PROCEDURE — 2580000003 HC RX 258: Performed by: NURSE PRACTITIONER

## 2022-04-18 PROCEDURE — 97162 PT EVAL MOD COMPLEX 30 MIN: CPT

## 2022-04-18 PROCEDURE — 7100000001 HC PACU RECOVERY - ADDTL 15 MIN: Performed by: ORTHOPAEDIC SURGERY

## 2022-04-18 PROCEDURE — 3700000000 HC ANESTHESIA ATTENDED CARE: Performed by: ORTHOPAEDIC SURGERY

## 2022-04-18 PROCEDURE — 3700000001 HC ADD 15 MINUTES (ANESTHESIA): Performed by: ORTHOPAEDIC SURGERY

## 2022-04-18 PROCEDURE — 2709999900 HC NON-CHARGEABLE SUPPLY: Performed by: ORTHOPAEDIC SURGERY

## 2022-04-18 PROCEDURE — 3600000004 HC SURGERY LEVEL 4 BASE: Performed by: ORTHOPAEDIC SURGERY

## 2022-04-18 PROCEDURE — A4217 STERILE WATER/SALINE, 500 ML: HCPCS | Performed by: ORTHOPAEDIC SURGERY

## 2022-04-18 PROCEDURE — 6360000002 HC RX W HCPCS: Performed by: ANESTHESIOLOGY

## 2022-04-18 DEVICE — COMPONENT PAT DIA35MM THK9MM KNEE POLY CEM CONVENTIONAL: Type: IMPLANTABLE DEVICE | Status: FUNCTIONAL

## 2022-04-18 DEVICE — IMPL KNEE PERSONA FEM CEM NAR RT SZ 8: Type: IMPLANTABLE DEVICE | Status: FUNCTIONAL

## 2022-04-18 DEVICE — CEMENT BNE 40GM HI VISC RADPQ FOR REV SURG: Type: IMPLANTABLE DEVICE | Status: FUNCTIONAL

## 2022-04-18 DEVICE — KIT TOT KNEE CEM FEM TIB VIT E SURF STD PERSONA: Type: IMPLANTABLE DEVICE | Status: FUNCTIONAL

## 2022-04-18 DEVICE — SCREW BNE L25MM DIA2.5MM KNEE FULL THRD HEX FEM PERSONA: Type: IMPLANTABLE DEVICE | Status: FUNCTIONAL

## 2022-04-18 DEVICE — PSN TIB STM 5 DEG SZ E R: Type: IMPLANTABLE DEVICE | Status: FUNCTIONAL

## 2022-04-18 DEVICE — PSN MC VE ASF R 13MM 8-11 EF: Type: IMPLANTABLE DEVICE | Status: FUNCTIONAL

## 2022-04-18 RX ORDER — ONDANSETRON 4 MG/1
4 TABLET, ORALLY DISINTEGRATING ORAL EVERY 8 HOURS PRN
Status: DISCONTINUED | OUTPATIENT
Start: 2022-04-18 | End: 2022-04-20 | Stop reason: HOSPADM

## 2022-04-18 RX ORDER — OXYCODONE HYDROCHLORIDE 5 MG/1
5 TABLET ORAL PRN
Status: DISCONTINUED | OUTPATIENT
Start: 2022-04-18 | End: 2022-04-18 | Stop reason: HOSPADM

## 2022-04-18 RX ORDER — ACETAMINOPHEN 325 MG/1
650 TABLET ORAL EVERY 6 HOURS
Status: DISCONTINUED | OUTPATIENT
Start: 2022-04-18 | End: 2022-04-20 | Stop reason: HOSPADM

## 2022-04-18 RX ORDER — OXYCODONE HYDROCHLORIDE 5 MG/1
10 TABLET ORAL PRN
Status: DISCONTINUED | OUTPATIENT
Start: 2022-04-18 | End: 2022-04-18 | Stop reason: HOSPADM

## 2022-04-18 RX ORDER — DIPHENHYDRAMINE HYDROCHLORIDE 50 MG/ML
12.5 INJECTION INTRAMUSCULAR; INTRAVENOUS
Status: DISCONTINUED | OUTPATIENT
Start: 2022-04-18 | End: 2022-04-18 | Stop reason: HOSPADM

## 2022-04-18 RX ORDER — TRANEXAMIC ACID 650 1/1
1950 TABLET ORAL
Status: DISCONTINUED | OUTPATIENT
Start: 2022-04-18 | End: 2022-04-18 | Stop reason: HOSPADM

## 2022-04-18 RX ORDER — MEPERIDINE HYDROCHLORIDE 25 MG/ML
12.5 INJECTION INTRAMUSCULAR; INTRAVENOUS; SUBCUTANEOUS EVERY 5 MIN PRN
Status: DISCONTINUED | OUTPATIENT
Start: 2022-04-18 | End: 2022-04-18 | Stop reason: HOSPADM

## 2022-04-18 RX ORDER — TRANEXAMIC ACID 650 1/1
1950 TABLET ORAL ONCE
Status: COMPLETED | OUTPATIENT
Start: 2022-04-18 | End: 2022-04-18

## 2022-04-18 RX ORDER — KETOROLAC TROMETHAMINE 15 MG/ML
15 INJECTION, SOLUTION INTRAMUSCULAR; INTRAVENOUS EVERY 6 HOURS
Status: COMPLETED | OUTPATIENT
Start: 2022-04-18 | End: 2022-04-18

## 2022-04-18 RX ORDER — SODIUM CHLORIDE 0.9 % (FLUSH) 0.9 %
10 SYRINGE (ML) INJECTION EVERY 12 HOURS SCHEDULED
Status: DISCONTINUED | OUTPATIENT
Start: 2022-04-18 | End: 2022-04-18 | Stop reason: HOSPADM

## 2022-04-18 RX ORDER — ROPIVACAINE HYDROCHLORIDE 5 MG/ML
INJECTION, SOLUTION EPIDURAL; INFILTRATION; PERINEURAL PRN
Status: DISCONTINUED | OUTPATIENT
Start: 2022-04-18 | End: 2022-04-18 | Stop reason: SDUPTHER

## 2022-04-18 RX ORDER — SODIUM CHLORIDE 0.9 % (FLUSH) 0.9 %
5-40 SYRINGE (ML) INJECTION EVERY 12 HOURS SCHEDULED
Status: DISCONTINUED | OUTPATIENT
Start: 2022-04-18 | End: 2022-04-18 | Stop reason: HOSPADM

## 2022-04-18 RX ORDER — OXYCODONE HYDROCHLORIDE 5 MG/1
10 TABLET ORAL EVERY 4 HOURS PRN
Status: DISCONTINUED | OUTPATIENT
Start: 2022-04-18 | End: 2022-04-20 | Stop reason: HOSPADM

## 2022-04-18 RX ORDER — SODIUM CHLORIDE 0.9 % (FLUSH) 0.9 %
10 SYRINGE (ML) INJECTION PRN
Status: DISCONTINUED | OUTPATIENT
Start: 2022-04-18 | End: 2022-04-20 | Stop reason: HOSPADM

## 2022-04-18 RX ORDER — SODIUM CHLORIDE, SODIUM LACTATE, POTASSIUM CHLORIDE, CALCIUM CHLORIDE 600; 310; 30; 20 MG/100ML; MG/100ML; MG/100ML; MG/100ML
INJECTION, SOLUTION INTRAVENOUS CONTINUOUS
Status: DISCONTINUED | OUTPATIENT
Start: 2022-04-18 | End: 2022-04-20 | Stop reason: HOSPADM

## 2022-04-18 RX ORDER — ASPIRIN 81 MG/1
81 TABLET ORAL 2 TIMES DAILY
Status: DISCONTINUED | OUTPATIENT
Start: 2022-04-18 | End: 2022-04-20 | Stop reason: HOSPADM

## 2022-04-18 RX ORDER — MORPHINE SULFATE 2 MG/ML
2 INJECTION, SOLUTION INTRAMUSCULAR; INTRAVENOUS
Status: DISCONTINUED | OUTPATIENT
Start: 2022-04-18 | End: 2022-04-20 | Stop reason: HOSPADM

## 2022-04-18 RX ORDER — SENNA AND DOCUSATE SODIUM 50; 8.6 MG/1; MG/1
1 TABLET, FILM COATED ORAL 2 TIMES DAILY
Qty: 20 TABLET | Refills: 0 | Status: SHIPPED | OUTPATIENT
Start: 2022-04-18 | End: 2022-04-28

## 2022-04-18 RX ORDER — SODIUM CHLORIDE, SODIUM LACTATE, POTASSIUM CHLORIDE, CALCIUM CHLORIDE 600; 310; 30; 20 MG/100ML; MG/100ML; MG/100ML; MG/100ML
INJECTION, SOLUTION INTRAVENOUS CONTINUOUS
Status: ACTIVE | OUTPATIENT
Start: 2022-04-18 | End: 2022-04-19

## 2022-04-18 RX ORDER — SODIUM CHLORIDE 0.9 % (FLUSH) 0.9 %
5-40 SYRINGE (ML) INJECTION PRN
Status: DISCONTINUED | OUTPATIENT
Start: 2022-04-18 | End: 2022-04-18 | Stop reason: HOSPADM

## 2022-04-18 RX ORDER — HYDROCODONE BITARTRATE AND ACETAMINOPHEN 5; 325 MG/1; MG/1
1 TABLET ORAL EVERY 4 HOURS PRN
Qty: 42 TABLET | Refills: 0 | Status: SHIPPED | OUTPATIENT
Start: 2022-04-18 | End: 2022-04-25

## 2022-04-18 RX ORDER — SODIUM CHLORIDE 0.9 % (FLUSH) 0.9 %
10 SYRINGE (ML) INJECTION PRN
Status: DISCONTINUED | OUTPATIENT
Start: 2022-04-18 | End: 2022-04-18 | Stop reason: HOSPADM

## 2022-04-18 RX ORDER — LIDOCAINE HYDROCHLORIDE 10 MG/ML
1 INJECTION, SOLUTION EPIDURAL; INFILTRATION; INTRACAUDAL; PERINEURAL
Status: DISCONTINUED | OUTPATIENT
Start: 2022-04-18 | End: 2022-04-18 | Stop reason: HOSPADM

## 2022-04-18 RX ORDER — TRANEXAMIC ACID 650 1/1
1950 TABLET ORAL ONCE
Status: COMPLETED | OUTPATIENT
Start: 2022-04-19 | End: 2022-04-19

## 2022-04-18 RX ORDER — ASPIRIN 81 MG/1
81 TABLET ORAL 2 TIMES DAILY
Qty: 60 TABLET | Refills: 0 | Status: SHIPPED | OUTPATIENT
Start: 2022-04-18 | End: 2022-05-18

## 2022-04-18 RX ORDER — SODIUM CHLORIDE 0.9 % (FLUSH) 0.9 %
10 SYRINGE (ML) INJECTION EVERY 12 HOURS SCHEDULED
Status: DISCONTINUED | OUTPATIENT
Start: 2022-04-18 | End: 2022-04-20 | Stop reason: HOSPADM

## 2022-04-18 RX ORDER — ACETAMINOPHEN 500 MG
1000 TABLET ORAL ONCE
Status: COMPLETED | OUTPATIENT
Start: 2022-04-18 | End: 2022-04-18

## 2022-04-18 RX ORDER — CELECOXIB 200 MG/1
200 CAPSULE ORAL ONCE
Status: COMPLETED | OUTPATIENT
Start: 2022-04-18 | End: 2022-04-18

## 2022-04-18 RX ORDER — ONDANSETRON 2 MG/ML
4 INJECTION INTRAMUSCULAR; INTRAVENOUS
Status: DISCONTINUED | OUTPATIENT
Start: 2022-04-18 | End: 2022-04-18 | Stop reason: HOSPADM

## 2022-04-18 RX ORDER — MIDAZOLAM HYDROCHLORIDE 1 MG/ML
INJECTION INTRAMUSCULAR; INTRAVENOUS PRN
Status: DISCONTINUED | OUTPATIENT
Start: 2022-04-18 | End: 2022-04-18 | Stop reason: SDUPTHER

## 2022-04-18 RX ORDER — LEVOTHYROXINE SODIUM 0.07 MG/1
75 TABLET ORAL DAILY
Status: DISCONTINUED | OUTPATIENT
Start: 2022-04-18 | End: 2022-04-20 | Stop reason: HOSPADM

## 2022-04-18 RX ORDER — TIZANIDINE 4 MG/1
4 TABLET ORAL EVERY 6 HOURS PRN
Status: DISCONTINUED | OUTPATIENT
Start: 2022-04-18 | End: 2022-04-20 | Stop reason: HOSPADM

## 2022-04-18 RX ORDER — OXYCODONE HYDROCHLORIDE 5 MG/1
5 TABLET ORAL EVERY 4 HOURS PRN
Status: DISCONTINUED | OUTPATIENT
Start: 2022-04-18 | End: 2022-04-20 | Stop reason: HOSPADM

## 2022-04-18 RX ORDER — TIZANIDINE 4 MG/1
4 TABLET ORAL EVERY 6 HOURS PRN
Qty: 16 TABLET | Refills: 0 | Status: SHIPPED | OUTPATIENT
Start: 2022-04-18 | End: 2022-04-22

## 2022-04-18 RX ORDER — PROPOFOL 10 MG/ML
INJECTION, EMULSION INTRAVENOUS CONTINUOUS PRN
Status: DISCONTINUED | OUTPATIENT
Start: 2022-04-18 | End: 2022-04-18 | Stop reason: SDUPTHER

## 2022-04-18 RX ORDER — FENTANYL CITRATE 50 UG/ML
50 INJECTION, SOLUTION INTRAMUSCULAR; INTRAVENOUS EVERY 10 MIN PRN
Status: DISCONTINUED | OUTPATIENT
Start: 2022-04-18 | End: 2022-04-18 | Stop reason: HOSPADM

## 2022-04-18 RX ORDER — METOCLOPRAMIDE HYDROCHLORIDE 5 MG/ML
10 INJECTION INTRAMUSCULAR; INTRAVENOUS
Status: DISCONTINUED | OUTPATIENT
Start: 2022-04-18 | End: 2022-04-18 | Stop reason: HOSPADM

## 2022-04-18 RX ORDER — SODIUM CHLORIDE 9 MG/ML
25 INJECTION, SOLUTION INTRAVENOUS PRN
Status: DISCONTINUED | OUTPATIENT
Start: 2022-04-18 | End: 2022-04-18 | Stop reason: HOSPADM

## 2022-04-18 RX ORDER — ONDANSETRON 2 MG/ML
4 INJECTION INTRAMUSCULAR; INTRAVENOUS EVERY 6 HOURS PRN
Status: DISCONTINUED | OUTPATIENT
Start: 2022-04-18 | End: 2022-04-20 | Stop reason: HOSPADM

## 2022-04-18 RX ORDER — OXYCODONE HCL 10 MG/1
10 TABLET, FILM COATED, EXTENDED RELEASE ORAL ONCE
Status: COMPLETED | OUTPATIENT
Start: 2022-04-18 | End: 2022-04-18

## 2022-04-18 RX ORDER — POLYETHYLENE GLYCOL 3350 17 G/17G
17 POWDER, FOR SOLUTION ORAL DAILY PRN
Status: DISCONTINUED | OUTPATIENT
Start: 2022-04-18 | End: 2022-04-20 | Stop reason: HOSPADM

## 2022-04-18 RX ORDER — SENNA AND DOCUSATE SODIUM 50; 8.6 MG/1; MG/1
1 TABLET, FILM COATED ORAL 2 TIMES DAILY
Status: DISCONTINUED | OUTPATIENT
Start: 2022-04-18 | End: 2022-04-20 | Stop reason: HOSPADM

## 2022-04-18 RX ORDER — MAGNESIUM HYDROXIDE 1200 MG/15ML
LIQUID ORAL CONTINUOUS PRN
Status: COMPLETED | OUTPATIENT
Start: 2022-04-18 | End: 2022-04-18

## 2022-04-18 RX ORDER — SODIUM CHLORIDE 9 MG/ML
25 INJECTION, SOLUTION INTRAVENOUS PRN
Status: DISCONTINUED | OUTPATIENT
Start: 2022-04-18 | End: 2022-04-20 | Stop reason: HOSPADM

## 2022-04-18 RX ADMIN — OXYCODONE 5 MG: 5 TABLET ORAL at 21:22

## 2022-04-18 RX ADMIN — KETOROLAC TROMETHAMINE 15 MG: 15 INJECTION, SOLUTION INTRAMUSCULAR; INTRAVENOUS at 23:07

## 2022-04-18 RX ADMIN — CEFAZOLIN 2000 MG: 10 INJECTION, POWDER, FOR SOLUTION INTRAVENOUS at 09:45

## 2022-04-18 RX ADMIN — SENNOSIDES AND DOCUSATE SODIUM 1 TABLET: 50; 8.6 TABLET ORAL at 21:22

## 2022-04-18 RX ADMIN — ACETAMINOPHEN 650 MG: 325 TABLET ORAL at 17:23

## 2022-04-18 RX ADMIN — ONDANSETRON 4 MG: 4 TABLET, ORALLY DISINTEGRATING ORAL at 15:10

## 2022-04-18 RX ADMIN — OXYCODONE HYDROCHLORIDE 10 MG: 10 TABLET, FILM COATED, EXTENDED RELEASE ORAL at 09:16

## 2022-04-18 RX ADMIN — SODIUM CHLORIDE, PRESERVATIVE FREE 10 ML: 5 INJECTION INTRAVENOUS at 21:23

## 2022-04-18 RX ADMIN — SODIUM CHLORIDE, POTASSIUM CHLORIDE, SODIUM LACTATE AND CALCIUM CHLORIDE: 600; 310; 30; 20 INJECTION, SOLUTION INTRAVENOUS at 21:22

## 2022-04-18 RX ADMIN — TRANEXAMIC ACID 1950 MG: 650 TABLET ORAL at 09:15

## 2022-04-18 RX ADMIN — CELECOXIB 200 MG: 200 CAPSULE ORAL at 09:16

## 2022-04-18 RX ADMIN — SODIUM CHLORIDE, POTASSIUM CHLORIDE, SODIUM LACTATE AND CALCIUM CHLORIDE 1000 ML: 600; 310; 30; 20 INJECTION, SOLUTION INTRAVENOUS at 09:32

## 2022-04-18 RX ADMIN — ROPIVACAINE HYDROCHLORIDE 30 ML: 5 INJECTION, SOLUTION EPIDURAL; INFILTRATION; PERINEURAL at 09:41

## 2022-04-18 RX ADMIN — TRANEXAMIC ACID 1950 MG: 650 TABLET ORAL at 17:23

## 2022-04-18 RX ADMIN — ACETAMINOPHEN 650 MG: 325 TABLET ORAL at 23:07

## 2022-04-18 RX ADMIN — MIDAZOLAM HYDROCHLORIDE 2 MG: 1 INJECTION, SOLUTION INTRAMUSCULAR; INTRAVENOUS at 09:38

## 2022-04-18 RX ADMIN — PROPOFOL 60 MCG/KG/MIN: 10 INJECTION, EMULSION INTRAVENOUS at 09:57

## 2022-04-18 RX ADMIN — TIZANIDINE 4 MG: 4 TABLET ORAL at 21:23

## 2022-04-18 RX ADMIN — SODIUM CHLORIDE, POTASSIUM CHLORIDE, SODIUM LACTATE AND CALCIUM CHLORIDE: 600; 310; 30; 20 INJECTION, SOLUTION INTRAVENOUS at 15:12

## 2022-04-18 RX ADMIN — KETOROLAC TROMETHAMINE 15 MG: 15 INJECTION, SOLUTION INTRAMUSCULAR; INTRAVENOUS at 17:23

## 2022-04-18 RX ADMIN — ASPIRIN 81 MG: 81 TABLET, COATED ORAL at 21:22

## 2022-04-18 RX ADMIN — CEFAZOLIN SODIUM 2000 MG: 10 INJECTION, POWDER, FOR SOLUTION INTRAVENOUS at 17:23

## 2022-04-18 RX ADMIN — ACETAMINOPHEN 1000 MG: 500 TABLET ORAL at 09:15

## 2022-04-18 ASSESSMENT — PULMONARY FUNCTION TESTS
PIF_VALUE: 1
PIF_VALUE: 2
PIF_VALUE: 0
PIF_VALUE: 1
PIF_VALUE: 2
PIF_VALUE: 1
PIF_VALUE: 3
PIF_VALUE: 1
PIF_VALUE: 0
PIF_VALUE: 1
PIF_VALUE: 0
PIF_VALUE: 1
PIF_VALUE: 2
PIF_VALUE: 1
PIF_VALUE: 0
PIF_VALUE: 1

## 2022-04-18 ASSESSMENT — PAIN SCALES - GENERAL
PAINLEVEL_OUTOF10: 0
PAINLEVEL_OUTOF10: 6
PAINLEVEL_OUTOF10: 0
PAINLEVEL_OUTOF10: 5
PAINLEVEL_OUTOF10: 4
PAINLEVEL_OUTOF10: 0

## 2022-04-18 ASSESSMENT — PAIN DESCRIPTION - LOCATION: LOCATION: KNEE

## 2022-04-18 ASSESSMENT — PAIN DESCRIPTION - ORIENTATION: ORIENTATION: RIGHT

## 2022-04-18 ASSESSMENT — PAIN - FUNCTIONAL ASSESSMENT: PAIN_FUNCTIONAL_ASSESSMENT: 0-10

## 2022-04-18 NOTE — OP NOTE
Operative Note      Patient: Hansa Marshall  YOB: 1950  MRN: [id number]    Date of Procedure: 4/18/2022    Pre-Op Diagnosis: RIGHT KNEE OSTEOARTHRITIS    Post-Op Diagnosis: Same       Procedure(s):  RIGHT TOTAL KNEE  REPLACEMENT     Surgeon(s):  Ashley Poe MD    Assistant:   Physician Assistant: Rika Gaston PA-C    Anesthesia: Spinal    Estimated Blood Loss (mL): less than 979     Complications: None    Specimens:   None    Implants:  Implant Name Type Inv. Item Serial No.  Lot No. LRB No. Used Action   CEMENT BNE 40GM HI VISC RADPQ FOR REV SURG - [id number]  CEMENT BNE 40GM HI VISC RADPQ FOR REV SURG [id number] [employer] [id number] Right 1 Implanted   CEMENT BNE 40GM HI VISC RADPQ FOR REV SURG - [id number]  CEMENT BNE 40GM HI VISC RADPQ FOR REV SURG [id number] [employer] [id number] Right 1 Implanted   SCREW BNE L25MM DIA2. 5MM KNEE FULL THRD HEX FEM PERSONA - [id number]  SCREW BNE L25MM DIA2. 5MM KNEE FULL THRD HEX FEM PERSONA  [facility]- [id number] Right 1 Implanted         Drains: None    Findings: OA/DJD right knee    Detailed Description of Procedure:   Components:  Femur: 8 narrow  Tibia: E  Tibial insert: 13 mm CS  Patella: 35 mm      Indications: This is a 72-year-old female with degenerative arthrosis of the right knee. They have failed conservative treatment. Total knee arthroplasty was offered. The procedure was explained along with the risks, benefits and alternatives being reviewed. Potential risks including but not limited to infection, neurovascular complication, loosening, wear, arthrofibrosis, fracture, pain as well as the potential need for reoperation and/or revision surgery were all discussed with the patient and they consented to the procedure. Findings: Osteoarthrosis of the right knee    Operative procedure: The patient had a spinal and adductor canal block anesthetic administered per anesthesia.   The patient was then placed on the operative table in the supine position. All bony prominences were well-padded a tourniquet was placed on the right thigh over web roll. A U drape was used to exclude the lower extremity distal to the tourniquet from the rest of the body. The feet lower extremity was then sterilely prepped with ChloraPrep and sterilely draped in the usual manner. A timeout was performed. The patient, site, laterality and administration of antibiotics was confirmed with the OR team.    I began by exsanguinating the right lower extremity and inflating the tourniquet to 250 mmHg. Next identified landmarks. I made an anterior incision over the knee. Dissection was carried down carefully through the subcutaneous and fatty tissues. I took dissection down to the quadriceps tendon prepatellar bursa and peritenon over the patellar tendon. I mobilized then medially and laterally. I then entered the knee joint by incising through the medial aspect of the quadriceps tendon, through the medial retinaculum and down to the bone of the proximal tibia medial to the patellar tendon. I performed my medial release for sharply with a knife then completed this with an elevator. I then undermined the soft tissues behind the patellar tendon. Next I debrided the synovium from the suprapatellar pouch. At that point I everted the patella and brought the knee up into flexion. The knee showed degenerative wear. At that point I debrided the ACL. I debrided the medial and lateral meniscus partially. I then drilled for the intramedullary femoral guide. The intramedullary femoral guide was placed. A 5 degree valgus cut was planned with 10 millimeters of distal femoral resection. The PA then pinned the block in place. Next I made my distal femoral cut. The tibia was brought forward. I placed the external tibial guide. A 7 degree block was chosen.   I measured with a 2 mm stylus off the most involved compartment which was lateral.  With the PA protecting the soft tissues I made my tibial cut. I debrided any osteophytes from the femur as well as around the tibia. Next I brought the knee out into extension and debrided any remaining meniscus. I checked my extension gap with a spacer guide and was satisfied with this. I brought the knee into 90 degrees of flexion. I placed the fusion block. It measured 5 degrees. With the femoral sizing guide set to the appropriate degree of external rotation I obtained my femoral size which was 8. I then drilled for the chamfer block. The chamfer block was placed. Retractors were placed to protect the surrounding soft tissues. Next I made my anterior, posterior and chamfer cuts. I then debrided the posterior condyles with an osteotome, elevator and a rongeur. I checked my flexion gap to extension gap and was satisfied with this. Next a size for the tibia and decided on a size E tibia. I then placed the tibia and secured this with screws temporarily. I used a drop maximilian to obtain my rotation. Next I placed the trial femur. I then began with trial inserts. I decided on a size 13 mm CS tibial insert. With this insert I was satisfied with my varus and valgus stability at 0, 30 and 60 degrees as well as my stability in the AP plane at 90 degrees. I then drilled for the lugs on the femur. Measured with a caliper. I made a freehand patellar cut resecting approximately 1 cm of patella. I then sized and decided on a 35 millimeter patella. The PA drilled for the patellar pegs. I placed the trial patella and took the knee through range of motion. I was satisfied with my patellar tracking. I removed the trial instruments then with the exception of the tibia. I prepared for the tibial keel in standard fashion. I removed the trial tibial tray. I debrided my gutters, medially, laterally as well as posteriorly. The cement was then prepared.   While the cement was being prepared I infiltrated the posterior capsule as well as the periosteum with Ortho mix. I then thoroughly and copiously irrigated with pulsatile lavage. The knee was packed with sponges. Once the cement was ready I impacted the tibial component into place. I debrided any excess cement with the assistance of the PA with a [place] elevator. Next I placed the femoral component and impacted this into place. I debrided any excess cement with the PAs assistance using a freer elevator. I then placed the trial insert and brought the knee out into extension to allow the cement to harden. I then placed the patellar button followed by the patellar clamp and debrided any excess cement with a [place] elevator. Once the cement had hardened I look for any overhanging cement which was debrided with an osteotome and a mallet. The tourniquet was released. I remove the trial tibial tray. Hemostasis was maintained with electrocautery as well as with gentle pressure. I thoroughly alethea irrigated with pulsatile lavage. I then snapped in the permanent size 13 mm CS tibial insert. I again took the knee through range of motion and was satisfied with my patellar tracking. I was satisfied with my stability. Attention was turned to closure. The medial retinaculum proximal tibial tissues were closed with Ethibond suture. The PA repaired the distal quadriceps tendon with Ethibond suture. We then repaired the fatty tissue with 2-0 Vicryl. The subcutaneous tissue was closed with 2-0 Vicryl. The skin was closed with a running Monocryl stitch. Dermabond glue was applied followed by a sterile Aquacel dressing. Sponge and needle counts were confirmed to be correct. Due to the complexity of the case the PAs assistance was critical in obtaining the exposure, balancing, placement of the trial and permanent implants as well as closure. No qualified resident was available.     Specimens: None    Complications: None    Estimated blood loss: 100 cc    The patient was then transferred to their hospital bed and taken to the PACU in stable condition.     Electronically signed by Jesus Yeboah MD on 4/18/2022 at 11:37 AM

## 2022-04-18 NOTE — ANESTHESIA POSTPROCEDURE EVALUATION
Department of Anesthesiology  Postprocedure Note    Patient: Carolin Javier  MRN: [id number]  YOB: 1950  Date of evaluation: 4/18/2022  Time:  12:05 PM     Procedure Summary     Date: 04/18/22 Room / Location: [address]    Anesthesia Start: 0930 Anesthesia Stop: 5267    Procedure: RIGHT TOTAL KNEE  REPLACEMENT STANDARD, SUPINE, ADDUCTOR CANAL BLOCK, JUSTICE PERSONA -ROBIN  TO BE LATEX FREE (Right ) Diagnosis: (RIGHT KNEE OSTEOARTHRITIS)    Surgeons: Angie Up MD Responsible Provider: Leonie Marsh MD    Anesthesia Type: spinal ASA Status: 2          Anesthesia Type: spinal    Alberta Phase I: Alberta Score: 7    Alberta Phase II:      Last vitals: Reviewed and per EMR flowsheets.        Anesthesia Post Evaluation    Patient location during evaluation: bedside  Patient participation: complete - patient participated  Level of consciousness: awake and awake and alert  Airway patency: patent  Nausea & Vomiting: no nausea and no vomiting  Complications: no  Cardiovascular status: blood pressure returned to baseline and hemodynamically stable  Respiratory status: acceptable  Hydration status: euvolemic

## 2022-04-18 NOTE — ANESTHESIA PROCEDURE NOTES
Spinal Block    Patient location during procedure: pre-op  Reason for block: primary anesthetic  Staffing  Performed: anesthesiologist   Anesthesiologist: Leonie Marsh MD  Preanesthetic Checklist  Completed: patient identified, IV checked, site marked, risks and benefits discussed, surgical consent, monitors and equipment checked, pre-op evaluation, timeout performed, anesthesia consent given, oxygen available and patient being monitored  Spinal Block  Patient position: sitting  Prep: Betadine  Patient monitoring: cardiac monitor, continuous pulse ox and frequent blood pressure checks  Approach: midline  Location: L4/L5  Provider prep: mask and sterile gloves  Local infiltration: lidocaine  Agent: bupivacaine  Dose: 2  Dose: 2  Needle  Needle type: Pencan   Needle gauge: 24 G  Assessment  Events: cerebrospinal fluid  Swirl obtained: Yes  CSF: clear  Attempts: 1  Hemodynamics: stable

## 2022-04-18 NOTE — PROGRESS NOTES
Physical Therapy Med Surg Initial Assessment  Facility/Department: [facility]  Room: N226/N226-       NAME: Hansa Marshall  : 1950 (89 y.o.)  MRN: [id number]  CODE STATUS: Full Code    Date of Service: 2022    Patient Diagnosis(es): Status post total knee replacement, right [Z96.651]   No chief complaint on file.     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    Osteoarthritis 2013    Hypothyroidism 10/05/2012    Postmenopausal 10/05/2012        Past Medical History:   Diagnosis Date    Breast cancer ([facility] [address] 2017    radiation s/p x 20    Hematuria     History of MRI of brain and brain stem 2011    normal    Hyperlipidemia     Hypothyroidism     Shingles     Urinary incontinence      Past Surgical History:   Procedure Laterality Date    AXILLARY SURGERY Left 2021    EXCISION  OF LEFT AXILLARY MASS performed by Steffany Merino MD at [place] [address]      COLONOSCOPY  9/15/14,2011    negative, dr Ash Boo  2009    negative    FOOT SURGERY  2009    JOINT REPLACEMENT Left     just surgery    KNEE ARTHROSCOPY  2006    MASTECTOMY, PARTIAL Right 2017    right partial mastectomy with SNB    THYROID SURGERY      thyroidectomy-no Ca    TOTAL KNEE ARTHROPLASTY Left 2016    TUBAL LIGATION         Chart Reviewed: Yes  Patient assessed for rehabilitation services?: Yes  Family / Caregiver Present: Yes (dtr)  General Comment  Comments: pt sleepy    Restrictions:  Restrictions/Precautions: Fall Risk,Weight Bearing (high hayden score)  Lower Extremity Weight Bearing Restrictions  Right Lower Extremity Weight Bearing: Weight Bearing As Tolerated     SUBJECTIVE: Subjective: \"I am cold\"    Pain  Pre Treatment Pain Screening  Pain at present: 0  Intervention List: Patient able to continue with treatment    Post Treatment Pain Screening:   Pain Screening  Patient Currently in Pain: Denies  Pain Assessment  Pain Level: 0    Prior Level of Function:  Social/Functional History  Lives With: Family (granddtr- goes to school)  Type of Home: House  Home Layout: One level,Laundry in basement (has stair lift to basement)  Home Access: Stairs to enter with rails  Entrance Stairs - Number of Steps: 3  Entrance Stairs - Rails: Right  Bathroom Shower/Tub: Tub/Shower unit  Bathroom Toilet: Handicap height  Bathroom Equipment: Shower chair,Grab bars in shower,Hand-held shower  Home Equipment: Rolling walker,Cane  ADL Assistance: Independent  Homemaking Assistance: Independent  Homemaking Responsibilities: Yes  Ambulation Assistance: Independent (no AD)  Transfer Assistance: Independent  Active : Yes    OBJECTIVE:   Vision: Impaired  Vision Exceptions: Wears glasses at all times  Hearing: Exceptions to [facility]  Hearing Exceptions: Bilateral hearing aid;Hard of hearing/hearing concerns    Cognition:  Overall Orientation Status: Within Functional Limits  Follows Commands: Within Functional Limits    Observation/Palpation  Posture: Fair  Observation: ace wrap to R LE; mild dizziness sitting edge of bed    ROM:  RLE AROM: Exceptions  RLE General AROM: WFL except R knee NT due to ace wrap in place  LLE AROM : WFL    Strength:  Strength RLE  Comment: grossly 3+/5  Strength LLE  Comment: grossly 4/5    Neuro:  Balance  Sitting - Static: Good  Sitting - Dynamic: Good  Standing - Static: Fair;-  Standing - Dynamic: Fair;- (requires B UE support for steadiness)     Tone RLE  RLE Tone: Normotonic  Tone LLE  LLE Tone: Normotonic  Motor Control  Gross Motor?: WFL  Sensation  Overall Sensation Status: WFL    Bed mobility  Supine to Sit: Stand by assistance  Sit to Supine:  (NT- pt left in bedside chair to encourage OOB activity)  Comment: HOB elevated    Transfers  Sit to Stand: Contact guard assistance  Stand to sit: Contact guard assistance  Comment: cues for safe hand placement; WW used    Ambulation  Ambulation?: Yes  Ambulation 1  Surface: level tile  Device: Rolling Walker  Assistance: Contact guard assistance  Quality of Gait: antalgic pattern R  Gait Deviations: Slow Shi  Distance: 5 ft    Stairs/Curb  Stairs?: No         Activity Tolerance  Activity Tolerance: Patient limited by fatigue;Patient limited by endurance; Patient limited by pain          PT Education  PT Education: Goals;PT Role;Plan of Care;General Safety    ASSESSMENT:   Body structures, Functions, Activity limitations: Decreased functional mobility ; Decreased balance;Decreased ROM; Decreased endurance; Increased pain;Decreased strength;Decreased posture  Decision Making: Medium Complexity  History: med  Exam: med  Clinical Presentation: med    Prognosis: Good    DISCHARGE RECOMMENDATIONS:  Discharge Recommendations: Continue to assess pending progress    Assessment: Pt is 80 y.o. female s/p R TKA. Pt exhibits decreased ROM, balance, activity tolerance, strength with carryover to requiring increased assistance for mobility at this time. Continued PT is required for safe d/c home.   REQUIRES PT FOLLOW UP: Yes      PLAN OF CARE:  Plan  Times per week: 5-7  Times per day: Twice a day  Current Treatment Recommendations: Strengthening,Functional Mobility Training,Neuromuscular Re-education,Home Exercise Program,Equipment Evaluation, Education, & procurement,ROM,Transfer Training,Gait Training,Safety Education & Training,Balance Estel Pippa training,Pain Management,Patient/Caregiver Education & Training,Positioning  Safety Devices  Type of devices: Call light within [facility] in place,Left in chair    Goals:  Short term goals  Short term goal 1: Pt will demonstrate HEP indep  Short term goal 2: Pt will demonstrate R knee AROM 0-90 deg  Long term goals  Long term goal 1: Pt will demonstrate bed mobility indep  Long term goal 2: Pt will demonstrate transfers mod indep with safest AD  Long term goal 3: Pt will demonstrate amb >/= 150ft with safest AD mod indep. Long term goal 4: Pt will demonstrate stair negotiation 3 steps with 1 rail mod indep    [facility] (6 CLICK) BASIC MOBILITY  AM-PAC Inpatient Mobility Raw Score : 17     Therapy Time:   Individual   Time In 1435   Time Out 1453   Minutes 4750 Horace Muñoz PT, 04/18/22 at 3:54 PM         Definitions for assistance levels  Independent = pt does not require any physical supervision or assistance from another person for activity completion. Device may be needed.   Stand by assistance = pt requires verbal cues or instructions from another person, close to but not touching, to perform the activity  Minimal assistance= pt performs 75% or more of the activity; assistance is required to complete the activity  Moderate assistance= pt performs 50% of the activity; assistance is required to complete the activity  Maximal assistance = pt performs 25% of the activity; assistance is required to complete the activity  Dependent = pt requires total physical assistance to accomplish the task

## 2022-04-18 NOTE — PLAN OF CARE
See OT evaluation for all goals and OT POC.  Electronically signed by Jaret Hendricks OTR/L on 4/18/2022 at 4:13 PM

## 2022-04-18 NOTE — H&P
Interval History and Physical    I have interviewed and examined the patient and reviewed the recent History and Physical.  There have been no changes to the recent H&P documentation. The patient understands the planned operation and its associated risks and benefits and agrees to proceed. The surgical consent form has been signed.     LMP 01/10/2005 (Approximate)      Electronically signed by Nitin Ohara MD on 4/18/2022 at 8:38 AM

## 2022-04-18 NOTE — PROGRESS NOTES
[facility] OCCUPATIONAL THERAPY EVALUATION - ACUTE     NAME: Beck Seay  : 1950 (80 y.o.)  MRN: [id number]  CODE STATUS: Full Code  [id number]    Date of Service: 2022    Patient Diagnosis(es): Status post total knee replacement, right [Z96.651]   No chief complaint on file. 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    Osteoarthritis 2013    Hypothyroidism 10/05/2012    Postmenopausal 10/05/2012        Past Medical History:   Diagnosis Date    Breast cancer ([address] 2017    radiation s/p x 20    Hematuria     History of MRI of brain and brain stem 2011    normal    Hyperlipidemia     Hypothyroidism     Shingles     Urinary incontinence      Past Surgical History:   Procedure Laterality Date    AXILLARY SURGERY Left 2021    EXCISION  OF LEFT AXILLARY MASS performed by Sushant Saldaña MD at [place] [address]      COLONOSCOPY  9/15/14,2011    negative, dr Wise [place] [address]    negative    FOOT SURGERY  2009    JOINT REPLACEMENT Left     just surgery    KNEE ARTHROSCOPY  2006    MASTECTOMY, PARTIAL Right 2017    right partial mastectomy with SNB    THYROID SURGERY  2000    thyroidectomy-no Ca    TOTAL KNEE ARTHROPLASTY Left 2016    TUBAL LIGATION          Restrictions  Restrictions/Precautions: Fall Risk,Weight Bearing (high hayden score)  Lower Extremity Weight Bearing Restrictions  Right Lower Extremity Weight Bearing: Weight Bearing As Tolerated     Safety Devices: Safety Devices  Safety Devices in place: Yes  Type of devices:  All fall risk precautions in place      Subjective  Pre Treatment Pain Screening  Pain at present: 0  Scale Used: Numeric Score  Intervention List: Patient able to continue with treatment,Patient declined any intervention  Comments / Details: Soreness during weight bearing only    Pain Reassessment:   Pain Assessment  Patient Currently in Pain: Denies  Pain Assessment: 0-10  Pain Level: 0     Prior Level of Function:  Social/Functional History  Lives With: Family (granddtr- goes to school)  Type of Home: House  Home Layout: One level,Laundry in basement (has stair lift to basement)  Home Access: Stairs to enter with rails  Entrance Stairs - Number of Steps: 3  Entrance Stairs - Rails: Right  Bathroom Shower/Tub: Tub/Shower unit  Bathroom Toilet: Handicap height  Bathroom Equipment: Shower chair,Grab bars in shower,Hand-held shower  Home Equipment: Rolling walker,Cane  ADL Assistance: Independent  Homemaking Assistance: Independent  Homemaking Responsibilities: Yes  Ambulation Assistance: Independent (no AD)  Transfer Assistance: Independent  Active : Yes    OBJECTIVE:     Orientation Status:  Orientation  Overall Orientation Status: Within Functional Limits    Observation:  Observation/Palpation  Posture: Fair  Observation: ace wrap to R LE; mild dizziness sitting edge of bed    Cognition Status:  Cognition  Overall Cognitive Status: [facility]  Cognition Comment: Min increased processing time noted    Perception Status:  Perception  Overall Perceptual Status: WFL    Sensation Status:  Sensation  Overall Sensation Status: WFL    Vision and Hearing Status:  Vision  Vision: Impaired  Vision Exceptions: Wears glasses at all times  Hearing  Hearing: Exceptions to [facility]  Hearing Exceptions: Bilateral hearing aid,Hard of hearing/hearing concerns     ROM:   LUE AROM (degrees)  LUE AROM : WFL  Left Hand AROM (degrees)  Left Hand AROM: WFL  RUE AROM (degrees)  RUE AROM : WFL  Right Hand AROM (degrees)  Right Hand AROM: WFL    Strength:  LUE Strength  Gross LUE Strength: WFL  L Hand General: 4/5  LUE Strength Comment: 4/5 all planes  RUE Strength  Gross RUE Strength: WFL  R Hand General: 4/5  RUE Strength Comment: 4/5 all planes    Coordination, Tone, Quality of Movement: Tone RUE  RUE Tone: Normotonic  Tone LUE  LUE Tone: Normotonic  Coordination  Movements Are Fluid And Coordinated: No  Coordination and Movement description: Fine motor impairments,Tremors,Decreased speed,Decreased accuracy,Right UE,Left UE  Quality of Movement Other  Comment: Pt states tremors d/t cold    Hand Dominance:  Hand Dominance  Hand Dominance: Right    ADL Status:  ADL  Feeding: Independent  Grooming: Supervision  UE Bathing: Stand by assistance  LE Bathing: Minimal assistance  UE Dressing: Stand by assistance  LE Dressing: Minimal assistance  Toileting: Minimal assistance  Additional Comments: Simulated ADLs as above. Limited d/t fatigue and mild dizziness as well as report of feeling 'very cold'  Toilet Transfers  Toilet Transfer: Unable to assess  Toilet Transfers Comments: Anticipate CGA based on other mobility    Therapy key for assistance levels -   Independent = Pt. is able to perform task with no assistance but may require a device   Stand by assistance = Pt. does not perform task at an independent level but does not need physical assistance, requires verbal cues  Minimal, Moderate, Maximal Assistance = Pt. requires physical assistance (25%, 50%, 75% assist from helper) for task but is able to actively participate in task   Dependent = Pt. requires total assistance with task and is not able to actively participate with task completion     Functional Mobility:  Functional Mobility  Functional - Mobility Device: Rolling Walker  Activity: Other  Assist Level: Contact guard assistance  Functional Mobility Comments: CGA from bed to bedside chair; pt reports feeling very cold and tired.   Transfers  Sit to stand: Contact guard assistance  Stand to sit: Contact guard assistance  Transfer Comments: Increased effort    Bed Mobility  Bed mobility  Supine to Sit: Stand by assistance  Sit to Supine: Unable to assess (Up to bedside chair to promote OOB activity)  Comment: HOB elevated    Seated and Standing Balance:  Balance  Sitting Balance: Supervision  Standing Balance: Contact guard assistance    Functional Endurance:  Activity Tolerance  Activity Tolerance: Patient Tolerated treatment well  Activity Tolerance: Limited d/t very cold    D/C Recommendations:  OT D/C RECOMMENDATIONS  REQUIRES OT FOLLOW UP: Yes    Equipment Recommendations:  OT Equipment Recommendations  Other: Continue to assess    OT Education:   OT Education  OT Education: Wilfredo Cervantes of Care  Patient Education: Educated pt. on role of acute care OT  Barriers to Learning: Pain and fatigue    OT Follow Up:  OT D/C RECOMMENDATIONS  REQUIRES OT FOLLOW UP: Yes     Assessment/Discharge Disposition:  Assessment: Pt is a 80year old woman from home with support from family who presents to [facility] with the above deficits which impact her ability to perform ADLs and IADLs s/p knee replacement. Pt. demonstrates decreased overall balance, endurance and tolerance to mobility d/t feeling very cold. Pt. would benefit from continued OT to maximize independence and safety with ADL tasks.   Performance deficits / Impairments: Decreased functional mobility ,Decreased ADL status,Decreased endurance,Decreased balance,Decreased high-level IADLs  Prognosis: Good  Discharge Recommendations: Continue to assess pending progress  Decision Making: Medium Complexity  History: Pt's medical history is moderately complex  Exam: Pt. has 5 performance deficits  Assistance / Modification: Pt. requires min A    Six Click Score   How much help for putting on and taking off regular lower body clothing?: A Little  How much help for Bathing?: A Little  How much help for Toileting?: A Little  How much help for putting on and taking off regular upper body clothing?: A Little  How much help for taking care of personal grooming?: A Little  How much help for eating meals?: None  AM-PAC Inpatient Daily Activity Raw Score: 19  AM-PAC Inpatient ADL T-Scale Score : 40.22  ADL Inpatient CMS 0-100% Score: 42.8    Plan:  Plan  Times per week: 1-4x  Plan weeks: Length of acute stay  Current Treatment Recommendations: Balance Training,Functional Mobility Training,Endurance Training,Pain Management,Safety Education & Training,Patient/Caregiver Education & Training,Equipment Evaluation, Education, & procurement,Self-Care / ADL,Home Management Training    Goals:   Patient will:    - Improve functional endurance to tolerate/complete 30 mins of ADL's  - Be Mod I in UB ADLs   - Be Mod I in LB ADLs  - Be Mod I in ADL transfers without LOB  - Be Mod I in toileting tasks  - Access appropriate D/C site with as few architectural barriers as possible. - Sequence self-care tasks with no verbal cues for safety    Patient Goal: Patient goals : \"I want to get home\"     Discussed and agreed upon: Yes Comments:     Therapy Time:   OT Individual Minutes  Time In: 1435  Time Out: 5020  Minutes: 18    Eval: 18 minutes     Electronically signed by:     VEE Campbell/L, VEE/L  4/18/2022, 4:11 PM

## 2022-04-18 NOTE — CONSULTS
Consult Note    Reason for Consult:  Medical management    Requesting Physician:  Emmie Jack MD    HISTORY OF PRESENT ILLNESS:    The patient is a 80 y.o. female who is admitted under the orthopedic surgery service. Underwent RTKR secondary to R knee OA after outpatient conservative measures were no longer effective in managing symptoms. Has PMH of remote breast CA s/p radiation/lumpectomy, hypothyroidism, and HLD. OOB in chair. C/o feeling cold. Wrapped in blankets. Temporal temp 96F. Denies CP, SOB, N/V/D      Past Medical History:   Diagnosis Date    Breast cancer ([address] 2017    radiation s/p x 20    Hematuria     History of MRI of brain and brain stem 12/2011    normal    Hyperlipidemia     Hypothyroidism     Shingles     Urinary incontinence        Past Surgical History:   Procedure Laterality Date    AXILLARY SURGERY Left 9/28/2021    EXCISION  OF LEFT AXILLARY MASS performed by Niles Moran MD at [place] [address]      COLONOSCOPY  9/15/14,2011    negative, dr Inga Little  2009    negative    FOOT SURGERY  2009    JOINT REPLACEMENT Left     just surgery    KNEE ARTHROSCOPY  2006    MASTECTOMY, PARTIAL Right 2017    right partial mastectomy with SNB    THYROID SURGERY  2000    thyroidectomy-no Ca    TOTAL KNEE ARTHROPLASTY Left 11/02/2016    TUBAL LIGATION  1977       Prior to Admission medications    Medication Sig Start Date End Date Taking? Authorizing Provider   HYDROcodone-acetaminophen (NORCO) 5-325 MG per tablet Take 1 tablet by mouth every 4 hours as needed for Pain for up to 7 days. Intended supply: 7 days.  Take lowest dose possible to manage pain 4/18/22 4/25/22 Yes Vita Stacy PA-C   levothyroxine (SYNTHROID) 75 MCG tablet TAKE 1 TABLET DAILY 12/14/18   Brigida Barber MD       Scheduled Meds:   tranexamic acid  1,950 mg Oral Once    [START ON 4/19/2022] tranexamic acid  1,950 mg Oral Once    sodium chloride flush  10 mL IntraVENous 2 times per day    acetaminophen  650 mg Oral Q6H    ceFAZolin (ANCEF) IVPB  2,000 mg IntraVENous Q8H    ketorolac  15 mg IntraVENous Q6H    sennosides-docusate sodium  1 tablet Oral BID    aspirin  81 mg Oral BID     Continuous Infusions:   lactated ringers      sodium chloride      lactated ringers Stopped (04/18/22 1157)     PRN Meds:sodium chloride flush, sodium chloride, oxyCODONE **OR** oxyCODONE, morphine, ondansetron **OR** ondansetron, polyethylene glycol, tiZANidine    No Known Allergies    Social History     Socioeconomic History    Marital status:      Spouse name: Not on file    Number of children: Not on file    Years of education: Not on file    Highest education level: Not on file   Occupational History    Not on file   Tobacco Use    Smoking status: Never Smoker    Smokeless tobacco: Never Used   Vaping Use    Vaping Use: Never used   Substance and Sexual Activity    Alcohol use: Yes    Drug use: No    Sexual activity: Not Currently   Other Topics Concern    Not on file   Social History Narrative    Not on file     Social Determinants of Health     Financial Resource Strain:     Difficulty of Paying Living Expenses: Not on file   Food Insecurity:     Worried About Running Out of Food in the Last Year: Not on file    Issa of Food in the Last Year: Not on file   Transportation Needs:     Lack of Transportation (Medical): Not on file    Lack of Transportation (Non-Medical):  Not on file   Physical Activity:     Days of Exercise per Week: Not on file    Minutes of Exercise per Session: Not on file   Stress:     Feeling of Stress : Not on file   Social Connections:     Frequency of Communication with Friends and Family: Not on file    Frequency of Social Gatherings with Friends and Family: Not on file    Attends Faith Services: Not on file    Active Member of Clubs or Organizations: Not on file    Attends Club or Organization Meetings: Not on file    Marital Status: Not on file   Intimate Partner Violence:     Fear of Current or Ex-Partner: Not on file    Emotionally Abused: Not on file    Physically Abused: Not on file    Sexually Abused: Not on file   Housing Stability:     Unable to Pay for Housing in the Last Year: Not on file    Number of [place] in the Last Year: Not on file    Unstable Housing in the Last Year: Not on file       Family History   Problem Relation Age of Onset    Cancer Mother         colon cancer    Asthma Sister         emphysemia    Cancer Brother        Review Of Systems:   12 point ROS negative unless indicated below or in the HPI    Physical Exam:  Vitals:    04/18/22 1255 04/18/22 1300 04/18/22 1305 04/18/22 1344   BP: (!) 101/58 (!) 101/58 (!) 100/55 (!) 110/58   Pulse: 55 58 58 60   Resp: 16 17 14 16   Temp: 97.3 °F (36.3 °C)   95.9 °F (35.5 °C)   TempSrc: Temporal   Temporal   SpO2: 94% 95% 96% 96%   Weight:       Height:           CONSTITUTIONAL:  awake, alert, cooperative, no apparent distress, and appears stated age  CARDIOVASCULAR: Regular rate and rhythm, normal S1 and S2, no S3 or S4, and no murmur noted  ABDOMEN: Normal bowel sounds, soft, non-distended, non-tender, no masses palpated, no hepatosplenomegally  MUSCULOSKELETAL:  There is no redness, warmth, or swelling of the joints. NEUROLOGIC:  Awake, alert, oriented to name, place and time. SKIN:  Warm and Dry. RLE ace wrapped    Labs:  No results found for this or any previous visit (from the past 24 hour(s)). Assessment/Plan:    80 y.o. female with PMH of  MH of remote breast CA s/p radiation/lumpectomy, hypothyroidism, and HLD presented with:    R knee OA: S/p R TKR.  Management per primary team    Hypothyroidism: Resume levothyroxine    Electronically signed by JEREL Monroe NP on 4/18/22 at 2:45 PM EDT

## 2022-04-18 NOTE — ANESTHESIA PROCEDURE NOTES
Peripheral Block    Patient location during procedure: pre-op  Staffing  Performed: anesthesiologist   Anesthesiologist: Dell Adams MD  Preanesthetic Checklist  Completed: patient identified, IV checked, site marked, risks and benefits discussed, surgical consent, monitors and equipment checked, pre-op evaluation, timeout performed, anesthesia consent given, oxygen available and patient being monitored  Peripheral Block  Patient position: supine  Prep: ChloraPrep  Patient monitoring: cardiac monitor, continuous pulse ox, frequent blood pressure checks and IV access  Block type: Saphenous  Laterality: right  Injection technique: single-shot  Guidance: ultrasound guided  Local infiltration: ropivacaine  Infiltration strength: 0.5 %  Dose: 30 mL  Provider prep: mask and sterile gloves  Local infiltration: ropivacaine  Needle  Needle type: combined needle/nerve stimulator   Needle gauge: 21 G  Needle length: 10 cm  Needle localization: ultrasound guidance  Test dose: negative  Assessment  Injection assessment: negative aspiration for heme, no paresthesia on injection and local visualized surrounding nerve on ultrasound  Paresthesia pain: none  Slow fractionated injection: yes  Hemodynamics: stable  Reason for block: post-op pain management

## 2022-04-18 NOTE — PROGRESS NOTES
Admission completed and daughter is going to take home her medication and purse. Patient is cold and temperature was low on assessment. Daughter and patient state this is nothing new. RN informed of temperature. Heat in room adjusted and warm blankets applied to patient. Patient is drinking hot tea. Call light with patient and bed alarm on. Right toes are warm. Patient able to move her toes and feet. Decreased sensation. Dressing is dry and intact to right leg with ice applied to knee area. Dr. Esequiel Limon on the unit and informed of temperature.   Electronically signed by Freedom Francisco RN on 4/18/2022 at 2:49 PM

## 2022-04-18 NOTE — ANESTHESIA PRE PROCEDURE
Department of Anesthesiology  Preprocedure Note       Name:  Marcelo Iverson   Age:  80 y.o.  :  1950                                          MRN:  [id number]         Date:  2022      Surgeon: Malinda Rudd):  Hansel Dockery MD    Procedure: Procedure(s):  RIGHT TOTAL KNEE  REPLACEMENT STANDARD, SUPINE, ADDUCTOR CANAL BLOCK, JUSTICE PERSONA -ROBIN  TO BE LATEX FREE    Medications prior to admission:   Prior to Admission medications    Medication Sig Start Date End Date Taking? Authorizing Provider   HYDROcodone-acetaminophen (NORCO) 5-325 MG per tablet Take 1 tablet by mouth every 4 hours as needed for Pain for up to 7 days. Intended supply: 7 days.  Take lowest dose possible to manage pain 22 Yes Ab Saini PA-C   levothyroxine (SYNTHROID) 75 MCG tablet TAKE 1 TABLET DAILY 18   Aide Parmar MD       Current medications:    Current Facility-Administered Medications   Medication Dose Route Frequency Provider Last Rate Last Admin    acetaminophen (TYLENOL) tablet 1,000 mg  1,000 mg Oral Once AdjJEREL Avila - CNP        oxyCODONE (OXYCONTIN) extended release tablet 10 mg  10 mg Oral Once JEREL Beverly - CNP        celecoxib (CELEBREX) capsule 200 mg  200 mg Oral Once JEREL Beverly - CNP        tranexamic acid (LYSTEDA) tablet 1,950 mg  1,950 mg Oral 90 Min Pre-Op JEREL Beverly CNP        0.9 % sodium chloride infusion  25 mL IntraVENous PRN JEREL Matos - CNP        lactated ringers infusion   IntraVENous Continuous JEREL Matos CNP        lidocaine PF 1 % injection 1 mL  1 mL IntraDERmal Once PRN JEREL Matos - CNP        sodium chloride flush 0.9 % injection 10 mL  10 mL IntraVENous 2 times per day JEREL Matos CNP        sodium chloride flush 0.9 % injection 10 mL  10 mL IntraVENous PRN EJREL Matos - CNP        ceFAZolin (ANCEF) 2000 mg in dextrose 5 % 100 mL IVPB  2,000 mg IntraVENous Once Slade Shepard MD        sodium chloride flush 0.9 % injection 5-40 mL  5-40 mL IntraVENous 2 times per day Lorrie Hyatt MD        sodium chloride flush 0.9 % injection 5-40 mL  5-40 mL IntraVENous PRN Lorrie Hyatt MD        0.9 % sodium chloride infusion  25 mL IntraVENous PRN Lorrie Hyatt MD        meperidine (DEMEROL) injection 12.5 mg  12.5 mg IntraVENous Q5 Min PRN Lorrie Hyatt MD        fentaNYL (SUBLIMAZE) injection 50 mcg  50 mcg IntraVENous Q10 Min PRN Lorrie Hyatt MD        HYDROmorphone (DILAUDID) injection 0.5 mg  0.5 mg IntraVENous Q10 Min PRN Lorrie Hyatt MD        oxyCODONE (ROXICODONE) immediate release tablet 5 mg  5 mg Oral PRN Lorrie Hyatt MD        Or    oxyCODONE (ROXICODONE) immediate release tablet 10 mg  10 mg Oral PRN Lorrie Hyatt MD        ondansetron [facility]) injection 4 mg  4 mg IntraVENous Once PRN Lorrie Hyatt MD        metoclopramide [facility]) injection 10 mg  10 mg IntraVENous Once PRN Lorrie Hyatt MD        diphenhydrAMINE (BENADRYL) injection 12.5 mg  12.5 mg IntraVENous Once PRN Lorrie Hyatt MD           Allergies:  No Known Allergies    Problem List:    Patient Active Problem List   Diagnosis Code    Hypothyroidism E03.9    Postmenopausal Z78.0    Osteoarthritis M19.90    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    Axillary mass, left R22.32    Palpable mass of soft tissue of shoulder M79.89       Past Medical History:        Diagnosis Date    Breast cancer ([address] 2017    radiation s/p x 20    Hematuria     History of MRI of brain and brain stem 12/2011    normal    Hyperlipidemia     Hypothyroidism     Shingles     Urinary incontinence        Past Surgical History:        Procedure Laterality Date    AXILLARY SURGERY Left 9/28/2021    EXCISION  OF LEFT AXILLARY MASS performed by [facility]ix [facility]      COLONOSCOPY  9/15/14,2011    negative, dr Bruce Morris  2009    negative    FOOT SURGERY  2009    JOINT REPLACEMENT Left     just surgery    KNEE ARTHROSCOPY  2006    MASTECTOMY, PARTIAL Right 2017    right partial mastectomy with SNB    THYROID SURGERY  2000    thyroidectomy-no Ca    TOTAL KNEE ARTHROPLASTY Left 11/02/2016    TUBAL LIGATION  1977       Social History:    Social History     Tobacco Use    Smoking status: Never Smoker    Smokeless tobacco: Never Used   Substance Use Topics    Alcohol use: Yes                                Counseling given: Not Answered      Vital Signs (Current): There were no vitals filed for this visit. BP Readings from Last 3 Encounters:   04/11/22 (!) 108/52   11/01/21 116/74   10/04/21 108/70       NPO Status:                                                                                 BMI:   Wt Readings from Last 3 Encounters:   04/11/22 166 lb 3.2 oz (75.4 kg)   11/01/21 160 lb (72.6 kg)   10/04/21 160 lb (72.6 kg)     There is no height or weight on file to calculate BMI.    CBC:   Lab Results   Component Value Date    WBC 4.9 04/11/2022    RBC 4.01 04/11/2022    RBC 3.94 01/10/2012    HGB 12.1 04/11/2022    HCT 36.6 04/11/2022    MCV 91.3 04/11/2022    RDW 14.0 04/11/2022     04/11/2022       CMP:   Lab Results   Component Value Date     04/11/2022    K 4.0 04/11/2022     04/11/2022    CO2 26 04/11/2022    BUN 13 04/11/2022    CREATININE 0.78 04/11/2022    GFRAA >60.0 04/11/2022    LABGLOM >60.0 04/11/2022    GLUCOSE 77 04/11/2022    GLUCOSE 114 01/10/2012    PROT 6.6 04/11/2022    CALCIUM 9.0 04/11/2022    BILITOT <0.2 04/11/2022    ALKPHOS 111 04/11/2022    AST 18 04/11/2022    ALT 13 04/11/2022       POC Tests: No results for input(s): POCGLU, POCNA, POCK, POCCL, POCBUN, POCHEMO, POCHCT in the last 72 hours.     Coags:   Lab Results   Component Value Date    PROTIME 13.0 04/11/2022    INR 1.0 04/11/2022    APTT 29.1 04/11/2022       HCG (If Applicable): No results found for: PREGTESTUR, PREGSERUM, HCG, HCGQUANT     ABGs: No results found for: PHART, PO2ART, QMD9FNS, WBY3LCY, BEART, F3RKAGRN     Type & Screen (If Applicable):  No results found for: LABABO, LABRH    Drug/Infectious Status (If Applicable):  No results found for: HIV, HEPCAB    COVID-19 Screening (If Applicable): No results found for: COVID19        Anesthesia Evaluation  Patient summary reviewed and Nursing notes reviewed no history of anesthetic complications:   Airway: Mallampati: II  TM distance: >3 FB   Neck ROM: full  Mouth opening: > = 3 FB Dental: normal exam         Pulmonary:Negative Pulmonary ROS and normal exam                               Cardiovascular:Negative CV ROS          ECG reviewed                        Neuro/Psych:   Negative Neuro/Psych ROS              GI/Hepatic/Renal: Neg GI/Hepatic/Renal ROS            Endo/Other:    (+) hypothyroidism::., .                 Abdominal:             Vascular: negative vascular ROS. Other Findings:             Anesthesia Plan      spinal     ASA 2       Induction: intravenous. Anesthetic plan and risks discussed with patient. Plan discussed with CRNA.     Attending anesthesiologist reviewed and agrees with Preprocedure content              Sandi Girard MD   4/18/2022

## 2022-04-19 LAB
ANION GAP SERPL CALCULATED.3IONS-SCNC: 10 MEQ/L (ref 9–15)
BASOPHILS ABSOLUTE: 0 K/UL (ref 0–0.2)
BASOPHILS RELATIVE PERCENT: 0.5 %
BUN BLDV-MCNC: 13 MG/DL (ref 8–23)
CALCIUM SERPL-MCNC: 8.1 MG/DL (ref 8.5–9.9)
CHLORIDE BLD-SCNC: 104 MEQ/L (ref 95–107)
CO2: 24 MEQ/L (ref 20–31)
CREAT SERPL-MCNC: 0.88 MG/DL (ref 0.5–0.9)
EOSINOPHILS ABSOLUTE: 0.3 K/UL (ref 0–0.7)
EOSINOPHILS RELATIVE PERCENT: 3.5 %
GFR AFRICAN AMERICAN: >60
GFR NON-AFRICAN AMERICAN: >60
GLUCOSE BLD-MCNC: 102 MG/DL (ref 70–99)
HCT VFR BLD CALC: 27.7 % (ref 37–47)
HCT VFR BLD CALC: 28.1 % (ref 37–47)
HEMOGLOBIN: 9.4 G/DL (ref 12–16)
HEMOGLOBIN: 9.6 G/DL (ref 12–16)
LACTIC ACID: 1 MMOL/L (ref 0.5–2.2)
LYMPHOCYTES ABSOLUTE: 1.3 K/UL (ref 1–4.8)
LYMPHOCYTES RELATIVE PERCENT: 17.4 %
MCH RBC QN AUTO: 30.8 PG (ref 27–31.3)
MCH RBC QN AUTO: 31.1 PG (ref 27–31.3)
MCHC RBC AUTO-ENTMCNC: 33.9 % (ref 33–37)
MCHC RBC AUTO-ENTMCNC: 34.2 % (ref 33–37)
MCV RBC AUTO: 90.8 FL (ref 82–100)
MCV RBC AUTO: 91 FL (ref 82–100)
MONOCYTES ABSOLUTE: 1 K/UL (ref 0.2–0.8)
MONOCYTES RELATIVE PERCENT: 13.2 %
NEUTROPHILS ABSOLUTE: 4.7 K/UL (ref 1.4–6.5)
NEUTROPHILS RELATIVE PERCENT: 65.4 %
PDW BLD-RTO: 13.9 % (ref 11.5–14.5)
PDW BLD-RTO: 14.3 % (ref 11.5–14.5)
PLATELET # BLD: 186 K/UL (ref 130–400)
PLATELET # BLD: 197 K/UL (ref 130–400)
POTASSIUM REFLEX MAGNESIUM: 4 MEQ/L (ref 3.4–4.9)
RBC # BLD: 3.05 M/UL (ref 4.2–5.4)
RBC # BLD: 3.08 M/UL (ref 4.2–5.4)
SODIUM BLD-SCNC: 138 MEQ/L (ref 135–144)
WBC # BLD: 6.9 K/UL (ref 4.8–10.8)
WBC # BLD: 7.2 K/UL (ref 4.8–10.8)

## 2022-04-19 PROCEDURE — 2580000003 HC RX 258: Performed by: INTERNAL MEDICINE

## 2022-04-19 PROCEDURE — 97116 GAIT TRAINING THERAPY: CPT

## 2022-04-19 PROCEDURE — 83605 ASSAY OF LACTIC ACID: CPT

## 2022-04-19 PROCEDURE — 36415 COLL VENOUS BLD VENIPUNCTURE: CPT

## 2022-04-19 PROCEDURE — 80048 BASIC METABOLIC PNL TOTAL CA: CPT

## 2022-04-19 PROCEDURE — 85027 COMPLETE CBC AUTOMATED: CPT

## 2022-04-19 PROCEDURE — 6370000000 HC RX 637 (ALT 250 FOR IP): Performed by: NURSE PRACTITIONER

## 2022-04-19 PROCEDURE — 2700000000 HC OXYGEN THERAPY PER DAY

## 2022-04-19 PROCEDURE — 6360000002 HC RX W HCPCS: Performed by: NURSE PRACTITIONER

## 2022-04-19 PROCEDURE — 85025 COMPLETE CBC W/AUTO DIFF WBC: CPT

## 2022-04-19 PROCEDURE — 2580000003 HC RX 258: Performed by: NURSE PRACTITIONER

## 2022-04-19 RX ORDER — 0.9 % SODIUM CHLORIDE 0.9 %
500 INTRAVENOUS SOLUTION INTRAVENOUS ONCE
Status: COMPLETED | OUTPATIENT
Start: 2022-04-19 | End: 2022-04-19

## 2022-04-19 RX ADMIN — OXYCODONE 10 MG: 5 TABLET ORAL at 20:53

## 2022-04-19 RX ADMIN — ACETAMINOPHEN 650 MG: 325 TABLET ORAL at 11:09

## 2022-04-19 RX ADMIN — TIZANIDINE 4 MG: 4 TABLET ORAL at 09:22

## 2022-04-19 RX ADMIN — TRANEXAMIC ACID 1950 MG: 650 TABLET ORAL at 05:17

## 2022-04-19 RX ADMIN — OXYCODONE 5 MG: 5 TABLET ORAL at 08:29

## 2022-04-19 RX ADMIN — SODIUM CHLORIDE 500 ML: 9 INJECTION, SOLUTION INTRAVENOUS at 02:04

## 2022-04-19 RX ADMIN — ASPIRIN 81 MG: 81 TABLET, COATED ORAL at 20:53

## 2022-04-19 RX ADMIN — TIZANIDINE 4 MG: 4 TABLET ORAL at 23:02

## 2022-04-19 RX ADMIN — ACETAMINOPHEN 650 MG: 325 TABLET ORAL at 05:17

## 2022-04-19 RX ADMIN — SENNOSIDES AND DOCUSATE SODIUM 1 TABLET: 50; 8.6 TABLET ORAL at 20:53

## 2022-04-19 RX ADMIN — LEVOTHYROXINE SODIUM 75 MCG: 0.07 TABLET ORAL at 09:49

## 2022-04-19 RX ADMIN — TIZANIDINE 4 MG: 4 TABLET ORAL at 17:00

## 2022-04-19 RX ADMIN — OXYCODONE 10 MG: 5 TABLET ORAL at 12:26

## 2022-04-19 RX ADMIN — ACETAMINOPHEN 650 MG: 325 TABLET ORAL at 16:35

## 2022-04-19 RX ADMIN — SODIUM CHLORIDE, PRESERVATIVE FREE 10 ML: 5 INJECTION INTRAVENOUS at 20:54

## 2022-04-19 RX ADMIN — OXYCODONE 10 MG: 5 TABLET ORAL at 16:35

## 2022-04-19 RX ADMIN — CEFAZOLIN SODIUM 2000 MG: 10 INJECTION, POWDER, FOR SOLUTION INTRAVENOUS at 01:17

## 2022-04-19 RX ADMIN — ACETAMINOPHEN 650 MG: 325 TABLET ORAL at 23:02

## 2022-04-19 RX ADMIN — ASPIRIN 81 MG: 81 TABLET, COATED ORAL at 08:29

## 2022-04-19 ASSESSMENT — PAIN DESCRIPTION - ORIENTATION
ORIENTATION: RIGHT

## 2022-04-19 ASSESSMENT — PAIN SCALES - GENERAL
PAINLEVEL_OUTOF10: 9
PAINLEVEL_OUTOF10: 6
PAINLEVEL_OUTOF10: 5
PAINLEVEL_OUTOF10: 5
PAINLEVEL_OUTOF10: 7
PAINLEVEL_OUTOF10: 5
PAINLEVEL_OUTOF10: 8
PAINLEVEL_OUTOF10: 6
PAINLEVEL_OUTOF10: 7
PAINLEVEL_OUTOF10: 9
PAINLEVEL_OUTOF10: 7
PAINLEVEL_OUTOF10: 7
PAINLEVEL_OUTOF10: 6
PAINLEVEL_OUTOF10: 5

## 2022-04-19 ASSESSMENT — PAIN DESCRIPTION - LOCATION
LOCATION: KNEE

## 2022-04-19 ASSESSMENT — PAIN DESCRIPTION - PAIN TYPE
TYPE: SURGICAL PAIN
TYPE: ACUTE PAIN;SURGICAL PAIN
TYPE: SURGICAL PAIN;ACUTE PAIN

## 2022-04-19 ASSESSMENT — PAIN - FUNCTIONAL ASSESSMENT
PAIN_FUNCTIONAL_ASSESSMENT: ACTIVITIES ARE NOT PREVENTED
PAIN_FUNCTIONAL_ASSESSMENT: ACTIVITIES ARE NOT PREVENTED

## 2022-04-19 ASSESSMENT — PAIN DESCRIPTION - FREQUENCY
FREQUENCY: CONTINUOUS
FREQUENCY: CONTINUOUS

## 2022-04-19 ASSESSMENT — PAIN DESCRIPTION - ONSET
ONSET: ON-GOING
ONSET: ON-GOING

## 2022-04-19 ASSESSMENT — PAIN DESCRIPTION - DESCRIPTORS
DESCRIPTORS: THROBBING;CRAMPING
DESCRIPTORS: THROBBING

## 2022-04-19 ASSESSMENT — PAIN DESCRIPTION - DIRECTION
RADIATING_TOWARDS: RIGHT
RADIATING_TOWARDS: RIGHT

## 2022-04-19 NOTE — PROGRESS NOTES
Department of Orthopedic Surgery  Attending Progress Note      SUBJECTIVE: The patient complains of right knee pain    OBJECTIVE    Physical    VITALS:  BP (!) 104/37   Pulse 58   Temp 97.5 °F (36.4 °C) (Oral)   Resp 16   Ht 5' 5\" (1.651 m)   Wt 166 lb 3.2 oz (75.4 kg)   LMP 01/10/2005 (Approximate)   SpO2 96%   BMI 27.66 kg/m²   24HR INTAKE/OUTPUT:    Intake/Output Summary (Last 24 hours) at 4/19/2022 1253  Last data filed at 4/19/2022 1217  Gross per 24 hour   Intake 1040 ml   Output --   Net 1040 ml   The right knee dressing is clean and dry. She has intact ankle dorsiflexion plantarflexion on the right. She is firing her quad. Pedal pulse is 2+ and palpable.       Data    CBC:   Lab Results   Component Value Date    WBC 6.9 04/19/2022    RBC 3.08 04/19/2022    RBC 3.94 01/10/2012    HGB 9.6 04/19/2022    HCT 28.1 04/19/2022    MCV 91.0 04/19/2022    MCH 31.1 04/19/2022    MCHC 34.2 04/19/2022    RDW 14.3 04/19/2022     04/19/2022    MPV 8.7 04/17/2015     BMP:    Lab Results   Component Value Date     04/19/2022    K 4.0 04/19/2022     04/19/2022    CO2 24 04/19/2022    BUN 13 04/19/2022    LABALBU 3.7 04/11/2022    LABALBU 4.3 01/10/2012    CREATININE 0.88 04/19/2022    CALCIUM 8.1 04/19/2022    GFRAA >60.0 04/19/2022    LABGLOM >60.0 04/19/2022    GLUCOSE 102 04/19/2022    GLUCOSE 114 01/10/2012     Current Inpatient Medications    Current Facility-Administered Medications: lactated ringers infusion, , IntraVENous, Continuous  sodium chloride flush 0.9 % injection 10 mL, 10 mL, IntraVENous, 2 times per day  sodium chloride flush 0.9 % injection 10 mL, 10 mL, IntraVENous, PRN  0.9 % sodium chloride infusion, 25 mL, IntraVENous, PRN  acetaminophen (TYLENOL) tablet 650 mg, 650 mg, Oral, Q6H  oxyCODONE (ROXICODONE) immediate release tablet 5 mg, 5 mg, Oral, Q4H PRN **OR** oxyCODONE (ROXICODONE) immediate release tablet 10 mg, 10 mg, Oral, Q4H PRN  morphine (PF) injection 2 mg, 2 mg, IntraVENous, Q3H PRN  ondansetron (ZOFRAN-ODT) disintegrating tablet 4 mg, 4 mg, Oral, Q8H PRN **OR** ondansetron (ZOFRAN) injection 4 mg, 4 mg, IntraVENous, Q6H PRN  sennosides-docusate sodium (SENOKOT-S) 8.6-50 MG tablet 1 tablet, 1 tablet, Oral, BID  polyethylene glycol (GLYCOLAX) packet 17 g, 17 g, Oral, Daily PRN  aspirin EC tablet 81 mg, 81 mg, Oral, BID  tiZANidine (ZANAFLEX) tablet 4 mg, 4 mg, Oral, Q6H PRN  lactated ringers infusion, , IntraVENous, Continuous  levothyroxine (SYNTHROID) tablet 75 mcg, 75 mcg, Oral, Daily    ASSESSMENT AND PLAN      Status post right total knee arthroplasty    Mobilize weightbearing as tolerated, pain control, weightbearing as tolerated, discharge planning. Aspirin for DVT prophylaxis. Operative findings discussed with patient. Questions answered.

## 2022-04-19 NOTE — PROGRESS NOTES
Physical Therapy Med Surg Daily Treatment Note  Facility/Department: [facility]  Room: N226/N226-01       NAME: Ling Quintanilla  : 1950 (6 y.o.)  MRN: [id number]  CODE STATUS: Full Code    Date of Service: 2022    Patient Diagnosis(es): Status post total knee replacement, right [Z96.651]   No chief complaint on file. 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    Osteoarthritis 2013    Hypothyroidism 10/05/2012    Postmenopausal 10/05/2012        Past Medical History:   Diagnosis Date    Breast cancer ([facility] [address] 2017    radiation s/p x 20    Hematuria     History of MRI of brain and brain stem 2011    normal    Hyperlipidemia     Hypothyroidism     Shingles     Urinary incontinence      Past Surgical History:   Procedure Laterality Date    AXILLARY SURGERY Left 2021    EXCISION  OF LEFT AXILLARY MASS performed by D[facility]      COLONOSCOPY  9/15/14,2011    negative, dr Rebekah Alegria      negative    FOOT SURGERY  2009    JOINT REPLACEMENT Left     just surgery    KNEE ARTHROSCOPY  2006    MASTECTOMY, PARTIAL Right 2017    right partial mastectomy with SNB    THYROID SURGERY      thyroidectomy-no Ca    TOTAL KNEE ARTHROPLASTY Left 2016    TUBAL LIGATION         Restrictions  Restrictions/Precautions: Fall Risk;Weight Bearing (high hayden score)  Lower Extremity Weight Bearing Restrictions  Right Lower Extremity Weight Bearing: Weight Bearing As Tolerated    SUBJECTIVE   General  Chart Reviewed: Yes  Family / Caregiver Present: Yes  Subjective  Subjective: \"I want to go home but it's hurting so bad. \"    Pre-Session Pain Report  Pre Treatment Pain Screening  Pain at present: 8  Scale Used: Numeric Score  Intervention List: Patient able to continue with treatment  Pain Screening  Patient Currently in Pain: Yes       Post-Session Pain Report  Pain Assessment  Pain Assessment: 0-10  Pain Level: 8  Pain Type: Surgical pain  Pain Location: Knee  Pain Orientation: Right         OBJECTIVE          Transfers  Sit to Stand: Contact guard assistance  Stand to sit: Contact guard assistance  Comment: cues for safe hand placement; [employer] used    Ambulation  Ambulation?: Yes  Ambulation 1  Surface: level tile  Device: Rolling Walker  Assistance: Stand by assistance  Quality of Gait: antalgic step through pattern R  Gait Deviations: Slow Shi  Distance: 30'          Exercises  Knee Long Arc Quad: x 5  Comments: pt limited by pain. Activity Tolerance  Activity Tolerance: Patient limited by pain          ASSESSMENT   Assessment: pt limited by pain, able to ambulate back from restroom, deferred stair training to tomorrow's AM session. Discharge Recommendations:  Continue to assess pending progress    Goals  Short term goals  Short term goal 1: Pt will demonstrate HEP indep  Short term goal 2: Pt will demonstrate R knee AROM 0-90 deg  Long term goals  Long term goal 1: Pt will demonstrate bed mobility indep  Long term goal 2: Pt will demonstrate transfers mod indep with safest AD  Long term goal 3: Pt will demonstrate amb >/= 150ft with safest AD mod indep.   Long term goal 4: Pt will demonstrate stair negotiation 3 steps with 1 rail mod indep    PLAN    Times per week: 5-7  Times per day: Twice a day  Safety Devices  Type of devices: Left in chair,Call light within reach,All fall risk precautions in place,Chair alarm in place,Nurse notified     [facility] (6 CLICK) Dominik [place] 28 Inpatient Mobility Raw Score : 17      Therapy Time   Individual   Time In 1318   Time Out 1330   Minutes 12      Gait: 8  Trsf: 2  Therex: 2        Jada Gould PTA, 04/19/22 at 1:42 PM Definitions for assistance levels  Independent = pt does not require any physical supervision or assistance from another person for activity completion. Device may be needed.   Stand by assistance = pt requires verbal cues or instructions from another person, close to but not touching, to perform the activity  Minimal assistance= pt performs 75% or more of the activity; assistance is required to complete the activity  Moderate assistance= pt performs 50% of the activity; assistance is required to complete the activity  Maximal assistance = pt performs 25% of the activity; assistance is required to complete the activity  Dependent = pt requires total physical assistance to accomplish the task

## 2022-04-19 NOTE — PROGRESS NOTES
[facility] Occupational Therapy      Date: 2022  Patient Name: Regis Barragan        MRN: [id number]  Account: [de-identified]   : 1950  (80 y.o.)  Room: [place] [address]    Chart reviewed, attempted OT at [phone]. Patient not seen 2° to:    Pt declined therapy at this time reporting that she \"just got comfortable\" and does not want to exacerbate the pain. Provided encouragement and educated pt in the role of Occupational Therapy. Pt reports that she had her other knee replaced and does not have any concerns at this time. Pt agreeable to Occupational Therapy attempting again this afternoon. Will attempt again when able.     Electronically signed by LLOYD Tanner on 2022 at 10:31 AM

## 2022-04-19 NOTE — PROGRESS NOTES
[facility] Occupational Therapy      Date: 2022  Patient Name: Ling Quintanilla        MRN: [id number]  Account: [de-identified]   : 1950  (80 y.o.)  Room: [facility] [address]    Chart reviewed, attempted OT at 0067. Patient not seen 2° to:    Pt declined therapy at this time requesting to rest and let pain medication \"kick in\". RN in room passing pain medication. Will attempt again when able.     Electronically signed by LLOYD Garza on 2022 at 8:34 AM

## 2022-04-19 NOTE — PROGRESS NOTES
Physical Therapy Med Surg Daily Treatment Note  Facility/Department: [facility]  Room: N226/N226-01       NAME: Marcelo Iverson  : 1950 (42 y.o.)  MRN: [id number]  CODE STATUS: Full Code    Date of Service: 2022    Patient Diagnosis(es): Status post total knee replacement, right [Z96.651]   No chief complaint on file. 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    Osteoarthritis 2013    Hypothyroidism 10/05/2012    Postmenopausal 10/05/2012        Past Medical History:   Diagnosis Date    Breast cancer ([facility] [address] 2017    radiation s/p x 20    Hematuria     History of MRI of brain and brain stem 2011    normal    Hyperlipidemia     Hypothyroidism     Shingles     Urinary incontinence      Past Surgical History:   Procedure Laterality Date    AXILLARY SURGERY Left 2021    EXCISION  OF LEFT AXILLARY MASS performed by Yanelis Garrison MD at [place] [address]      COLONOSCOPY  9/15/14,2011    negative, dr Rolando Araya      negative    FOOT SURGERY  2009    JOINT REPLACEMENT Left     just surgery    KNEE ARTHROSCOPY  2006    MASTECTOMY, PARTIAL Right 2017    right partial mastectomy with SNB    THYROID SURGERY      thyroidectomy-no Ca    TOTAL KNEE ARTHROPLASTY Left 2016    TUBAL LIGATION  1977       Restrictions  Restrictions/Precautions: Fall Risk;Weight Bearing (high hayden score)  Lower Extremity Weight Bearing Restrictions  Right Lower Extremity Weight Bearing: Weight Bearing As Tolerated    SUBJECTIVE   General  Chart Reviewed: Yes  Family / Caregiver Present: No  Subjective  Subjective: \"I was doing a lot better until the pain got bad. \"    Pre-Session Pain Report  Pre Treatment Pain Screening  Pain at present: 9  Scale Used: Numeric Score  Intervention List: Patient able to continue with treatment  Pain Screening  Patient Currently in Pain: Yes       Post-Session Pain Report  Pain Assessment  Pain Assessment: 0-10  Pain Level: 9  Pain Type: Acute pain;Surgical pain  Pain Location: Knee  Pain Orientation: Right         OBJECTIVE        Bed mobility  Supine to Sit:  (DNT in restroom at start of tx.)  Sit to Supine: Minimal assistance (assistance needed to lift RLE into bed.)    Transfers  Sit to Stand: Contact guard assistance  Stand to sit: Contact guard assistance  Comment: cues for safe hand placement; [employer] used    Ambulation  Ambulation?: Yes  Ambulation 1  Surface: level tile  Device: Rolling Walker  Assistance: Stand by assistance  Quality of Gait: antalgic step through pattern R  Gait Deviations: Slow Shi  Distance: 30'    Stairs/Curb  Stairs?: No (deferred to PM session.)               Exercises  Comments: pt given written HEP for supine/seated therex, instructed on technique dosage and frequency of all exercises, pt verbalizes understanding. Activity Tolerance  Activity Tolerance: Patient limited by pain          ASSESSMENT   Assessment: pt limited by pain, able to ambulate back from restroom, deferred stair training to PM session. Discharge Recommendations:  Continue to assess pending progress    Goals  Short term goals  Short term goal 1: Pt will demonstrate HEP indep  Short term goal 2: Pt will demonstrate R knee AROM 0-90 deg  Long term goals  Long term goal 1: Pt will demonstrate bed mobility indep  Long term goal 2: Pt will demonstrate transfers mod indep with safest AD  Long term goal 3: Pt will demonstrate amb >/= 150ft with safest AD mod indep. Long term goal 4: Pt will demonstrate stair negotiation 3 steps with 1 rail mod indep    PLAN    Times per week: 5-7  Times per day: Twice a day  Safety Devices  Type of devices:  All fall risk precautions in place,Bed alarm in place,Call light within reach,Left in bed,Nurse notified     [facility] (6 CLICK) Dominik Washington 28 Inpatient Mobility Raw Score : 17      Therapy Time   Individual   Time In 0914   Time Out 0924   Minutes 10      Gait: 8  BM/Trsf: 2        Prieto ALISSA Sutton, 04/19/22 at 9:33 AM         Definitions for assistance levels  Independent = pt does not require any physical supervision or assistance from another person for activity completion. Device may be needed.   Stand by assistance = pt requires verbal cues or instructions from another person, close to but not touching, to perform the activity  Minimal assistance= pt performs 75% or more of the activity; assistance is required to complete the activity  Moderate assistance= pt performs 50% of the activity; assistance is required to complete the activity  Maximal assistance = pt performs 25% of the activity; assistance is required to complete the activity  Dependent = pt requires total physical assistance to accomplish the task

## 2022-04-19 NOTE — PROGRESS NOTES
Progress Note  Date:2022       Room:[place] [address]  Rich Richardson     YOB: 1950     Age:71 y.o. Subjective    No acute events overnight. Has been up ambulating without instability. No new/acute complaints at present. Hemoglobin 9.6, down from 12.1 preoperatively. Objective         Vitals Last 24 Hours:  TEMPERATURE:  Temp  Av.3 °F (36.3 °C)  Min: 95.9 °F (35.5 °C)  Max: 98.1 °F (36.7 °C)  RESPIRATIONS RANGE: Resp  Av.4  Min: 0  Max: 19  PULSE OXIMETRY RANGE: SpO2  Av.2 %  Min: 85 %  Max: 100 %  PULSE RANGE: Pulse  Av.2  Min: 49  Max: 61  BLOOD PRESSURE RANGE: Systolic (23ODS), TOA:15 , Min:75 , OFY:507   ; Diastolic (13IHM), TFP:88, Min:31, Max:84    I/O (24Hr): Intake/Output Summary (Last 24 hours) at 2022 0847  Last data filed at 2022 1157  Gross per 24 hour   Intake 476.58 ml   Output --   Net 476.58 ml     Objective   Constitutional: Obese adult female reclining in bed no acute distress. Family present at bedside. Head: NCAT  Eyes: PERRLA, EOMI  Neck: Trachea midline, phonation normal  Cardiovascular: RRR. No significant murmurs appreciated. Warm and well perfused peripherally. Pulmonary: Normal rate and effort respiration on room air. Abdomen: Obese, soft, nondistended. Normoactive bowel sounds. Neurologic: Grossly alert and oriented. Nonfocal.  Psychiatric: Pleasant, calm, cooperative  Extremities: RLE postoperative site with dressing C/C/I. Neurovascularly intact distal to operative site on affected limb. Labs/Imaging/Diagnostics    Labs:  CBC:  Recent Labs     22  0256 22  0628   WBC 7.2 6.9   RBC 3.05* 3.08*   HGB 9.4* 9.6*   HCT 27.7* 28.1*   MCV 90.8 91.0   RDW 13.9 14.3    197     CHEMISTRIES:  Recent Labs     22  0628      K 4.0      CO2 24   BUN 13   CREATININE 0.88   GLUCOSE 102*     PT/INR:No results for input(s): PROTIME, INR in the last 72 hours.   APTT:No results for input(s): APTT in the last 72 hours. LIVER PROFILE:No results for input(s): AST, ALT, BILIDIR, BILITOT, ALKPHOS in the last 72 hours. Imaging Last 24 Hours:  No results found. Assessment//Plan           Hospital Problems           Last Modified POA    * (Principal) Status post total knee replacement, right 4/18/2022 Yes        Assessment & Plan    Summary:  80 y.o. female with PMH of  MH of remote breast CA s/p radiation/lumpectomy, hypothyroidism, and HLD presented with:     R knee OA S/p R TKR. Management per primary team     Post-operative anemia: No active bleeding. Not in transfusion range. Follow daily CBC.       Hypothyroidism: Resume levothyroxine    Electronically signed by Meeta Sutherland DO on 4/19/22 at 8:47 AM EDT

## 2022-04-19 NOTE — FLOWSHEET NOTE
Assessment and VS completed. Patient denies numbness, tingling, SOB and chest pain. +2 pedal pulses noted. Dressing C, D, I.     0124: Patient BP 87/37, perfectserve sent to Dr. Krystin Sutherland. 0243: Bolus completed. BP rechecked - 75/33. Perfectserve sent to Dr. Krystin Sutherland. Patient is asymptomatic. Denies headache, dizziness, lightheadedness. Ace wrap removed to verify no bleeding from the incision site. Dr. Krystin Sutherland states he will come assess the patient.

## 2022-04-19 NOTE — CARE COORDINATION
KYLERW along with the care team met with the pt to discuss her DC needs. Pt had a rough night with low BP so she could not get pain meds. She is having a lot of pain this morning. Plan will be home with [facility] and the pt is agreeable to [facility]. Pt has all equipment needed for home.   Referral was called to [facility],

## 2022-04-19 NOTE — FLOWSHEET NOTE
0900 Patient having increased pain, difficulty with ambulation Vital signs obtained and able to give pain medication at this time. Per patient she consistently has low BP SBP  typically. Will medicate per [facility]     0930 Patient unable to participate in therapy d/t pain. 1150 Patient resting in bed comfortably, pain is tolerable but still present. 1340 Patient unable to fully participate in therapy and do stairs for home going d/t pain. 1515 Patients eyes closed resting in bed, per patient pain is slowly improving.

## 2022-04-20 VITALS
HEIGHT: 65 IN | HEART RATE: 54 BPM | BODY MASS INDEX: 27.69 KG/M2 | RESPIRATION RATE: 18 BRPM | OXYGEN SATURATION: 93 % | TEMPERATURE: 97.9 F | SYSTOLIC BLOOD PRESSURE: 106 MMHG | WEIGHT: 166.2 LBS | DIASTOLIC BLOOD PRESSURE: 62 MMHG

## 2022-04-20 LAB
HCT VFR BLD CALC: 27.6 % (ref 37–47)
HEMOGLOBIN: 9.4 G/DL (ref 12–16)
MCH RBC QN AUTO: 30.8 PG (ref 27–31.3)
MCHC RBC AUTO-ENTMCNC: 33.9 % (ref 33–37)
MCV RBC AUTO: 90.9 FL (ref 82–100)
PDW BLD-RTO: 14.3 % (ref 11.5–14.5)
PLATELET # BLD: 194 K/UL (ref 130–400)
RBC # BLD: 3.04 M/UL (ref 4.2–5.4)
WBC # BLD: 6.8 K/UL (ref 4.8–10.8)

## 2022-04-20 PROCEDURE — 97535 SELF CARE MNGMENT TRAINING: CPT

## 2022-04-20 PROCEDURE — 85027 COMPLETE CBC AUTOMATED: CPT

## 2022-04-20 PROCEDURE — 36415 COLL VENOUS BLD VENIPUNCTURE: CPT

## 2022-04-20 PROCEDURE — 6370000000 HC RX 637 (ALT 250 FOR IP): Performed by: NURSE PRACTITIONER

## 2022-04-20 PROCEDURE — 97116 GAIT TRAINING THERAPY: CPT

## 2022-04-20 PROCEDURE — 2580000003 HC RX 258: Performed by: NURSE PRACTITIONER

## 2022-04-20 RX ADMIN — ASPIRIN 81 MG: 81 TABLET, COATED ORAL at 08:13

## 2022-04-20 RX ADMIN — SODIUM CHLORIDE, PRESERVATIVE FREE 10 ML: 5 INJECTION INTRAVENOUS at 08:16

## 2022-04-20 RX ADMIN — LEVOTHYROXINE SODIUM 75 MCG: 0.07 TABLET ORAL at 06:25

## 2022-04-20 RX ADMIN — OXYCODONE 10 MG: 5 TABLET ORAL at 11:06

## 2022-04-20 RX ADMIN — SENNOSIDES AND DOCUSATE SODIUM 1 TABLET: 50; 8.6 TABLET ORAL at 08:13

## 2022-04-20 RX ADMIN — TIZANIDINE 4 MG: 4 TABLET ORAL at 13:30

## 2022-04-20 RX ADMIN — ACETAMINOPHEN 650 MG: 325 TABLET ORAL at 05:37

## 2022-04-20 RX ADMIN — OXYCODONE 10 MG: 5 TABLET ORAL at 06:23

## 2022-04-20 RX ADMIN — TIZANIDINE 4 MG: 4 TABLET ORAL at 05:37

## 2022-04-20 RX ADMIN — OXYCODONE 10 MG: 5 TABLET ORAL at 02:37

## 2022-04-20 RX ADMIN — ACETAMINOPHEN 650 MG: 325 TABLET ORAL at 11:06

## 2022-04-20 ASSESSMENT — PAIN DESCRIPTION - LOCATION
LOCATION: KNEE

## 2022-04-20 ASSESSMENT — PAIN DESCRIPTION - ORIENTATION
ORIENTATION: RIGHT

## 2022-04-20 ASSESSMENT — PAIN DESCRIPTION - ONSET
ONSET: ON-GOING
ONSET: ON-GOING

## 2022-04-20 ASSESSMENT — PAIN DESCRIPTION - FREQUENCY
FREQUENCY: CONTINUOUS
FREQUENCY: CONTINUOUS

## 2022-04-20 ASSESSMENT — PAIN DESCRIPTION - PAIN TYPE
TYPE: SURGICAL PAIN
TYPE: SURGICAL PAIN

## 2022-04-20 ASSESSMENT — PAIN SCALES - GENERAL
PAINLEVEL_OUTOF10: 6
PAINLEVEL_OUTOF10: 5
PAINLEVEL_OUTOF10: 10
PAINLEVEL_OUTOF10: 3
PAINLEVEL_OUTOF10: 6

## 2022-04-20 ASSESSMENT — PAIN DESCRIPTION - PROGRESSION
CLINICAL_PROGRESSION: NOT CHANGED
CLINICAL_PROGRESSION: GRADUALLY IMPROVING

## 2022-04-20 ASSESSMENT — PAIN DESCRIPTION - DESCRIPTORS
DESCRIPTORS: ACHING
DESCRIPTORS: THROBBING
DESCRIPTORS: THROBBING

## 2022-04-20 ASSESSMENT — PAIN DESCRIPTION - DIRECTION
RADIATING_TOWARDS: RIGHT
RADIATING_TOWARDS: TIGHT

## 2022-04-20 NOTE — CARE COORDINATION
MARIYA along with the care team met with the pt to discuss her DC plan. Pt is doing much better today and the plan is for DC home with [facility] today.

## 2022-04-20 NOTE — PROGRESS NOTES
Physical Therapy Med Surg Daily Treatment Note  Facility/Department: [facility]  Room: N226/N226-       NAME: Chris Hancock  : 1950 (72 y.o.)  MRN: [id number]  CODE STATUS: Full Code    Date of Service: 2022    Patient Diagnosis(es): Status post total knee replacement, right [Z96.651]   No chief complaint on file.     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    Osteoarthritis 2013    Hypothyroidism 10/05/2012    Postmenopausal 10/05/2012        Past Medical History:   Diagnosis Date    Breast cancer ([facility] [address] 2017    radiation s/p x 20    Hematuria     History of MRI of brain and brain stem 2011    normal    Hyperlipidemia     Hypothyroidism     Shingles     Urinary incontinence      Past Surgical History:   Procedure Laterality Date    AXILLARY SURGERY Left 2021    EXCISION  OF LEFT AXILLARY MASS performed by Sugar Pulido MD at [place] [address]      COLONOSCOPY  9/15/14,2011    negative, dr Denae Brower      negative    FOOT SURGERY  2009    JOINT REPLACEMENT Left     just surgery    KNEE ARTHROSCOPY  2006    MASTECTOMY, PARTIAL Right 2017    right partial mastectomy with SNB    THYROID SURGERY      thyroidectomy-no Ca    TOTAL KNEE ARTHROPLASTY Left 2016    TOTAL KNEE ARTHROPLASTY Right 2022    RIGHT TOTAL KNEE  REPLACEMENT STANDARD, SUPINE, ADDUCTOR CANAL BLOCK, JUSTICE PERSONA -ROBIN  TO BE LATEX FREE performed by Balbir Lemons MD at  [facility]       Restrictions  Restrictions/Precautions: Fall Risk;Weight Bearing (high hayden score)  Lower Extremity Weight Bearing Restrictions  Right Lower Extremity Weight Bearing: Weight Bearing As Tolerated    SUBJECTIVE General  Chart Reviewed: Yes  Family / Caregiver Present: No  Subjective  Subjective: \"My blood pressure is always low but I don't feel dizzy. General Comment  Comments: BP readings taken during mobility, manual pressure taken by RN reading 106/61. RN okay with treating pt based on symptoms. Pre-Session Pain Report        Pre Treatment Pain Screening  Pain at present: 2  Intervention List: Patient able to continue with treatment    Post-Session Pain Report  Pain Assessment  Pain Assessment: 0-10  Pain Level: 3  Pain Location: Knee  Pain Orientation: Right         OBJECTIVE        Bed mobility  Supine to Sit: Supervision  Sit to Supine: Stand by assistance (difficulty lifting RLE into bed.)  Comment: increased time and effort to complete, vc's for improved sequencing. Transfers  Sit to Stand: Stand by assistance  Stand to sit: Stand by assistance  Car Transfer:  (visual demonstration and education provided, pt voices understanding and confidence in completing upon dc.)  Comment: cues for safe hand placement; [employer] used    Ambulation  Surface: level tile  Device: Rolling Walker  Assistance: Stand by assistance  Quality of Gait: antalgic step through pattern R  Gait Deviations: Slow Shi  Distance: 30'  Comments: decreased antalgia this session, pt reports improving confidence. Stairs/Curb  Stairs?: Yes  Stairs  # Steps : 4  Stairs Height: 6\"  Rails: Bilateral  Device: No Device  Assistance: Supervision  Comment: pt demos good ability to complete 3 stairs needed for entry into home. good carryover of cues for sequencing. Activity Tolerance  Activity Tolerance: Patient tolerated treatment well          ASSESSMENT   Assessment: improving pain, pt able to safely complete stairs, pt voices confidence in homegoing at [facility].      Discharge Recommendations:  Continue to assess pending progress    Goals  Short Term Goals  Short term goal 1: Pt will demonstrate HEP indep  Short term goal 2: Pt will demonstrate R knee AROM 0-90 deg  Long Term Goals  Long term goal 1: Pt will demonstrate bed mobility indep  Long term goal 2: Pt will demonstrate transfers mod indep with safest AD  Long term goal 3: Pt will demonstrate amb >/= 150ft with safest AD mod indep. Long term goal 4: Pt will demonstrate stair negotiation 3 steps with 1 rail mod indep    PLAN    Times per week: 5-7  Times per day: Twice a day  Safety Devices  Type of Devices: Left in chair,Call light within reach,All fall risk precautions in place,Chair alarm in place,Nurse notified     [facility] ([address] [place] [address]) Dominik Anders [place] 28 Inpatient Mobility Raw Score : 18      Therapy Time   Individual   Time In 0800   Time Out 0830   Minutes 30      Gait: 18   BM/TrsF: 12     Dossie Kocher, PTA, 04/20/22 at 8:44 AM         Definitions for assistance levels  Independent = pt does not require any physical supervision or assistance from another person for activity completion. Device may be needed.   Stand by assistance = pt requires verbal cues or instructions from another person, close to but not touching, to perform the activity  Minimal assistance= pt performs 75% or more of the activity; assistance is required to complete the activity  Moderate assistance= pt performs 50% of the activity; assistance is required to complete the activity  Maximal assistance = pt performs 25% of the activity; assistance is required to complete the activity  Dependent = pt requires total physical assistance to accomplish the task

## 2022-04-20 NOTE — PROGRESS NOTES
Progress Note   TKA    Subjective:     Post-Operative Day: 2 Status Post right Total Knee Arthroplasty  Systemic or Specific Complaints:No Complaints    Objective:     CURRENT VITALS:  BP (!) 116/52   Pulse 67   Temp 98.1 °F (36.7 °C) (Oral)   Resp 18   Ht 5' 5\" (1.651 m)   Wt 166 lb 3.2 oz (75.4 kg)   LMP 01/10/2005 (Approximate)   SpO2 95%   BMI 27.66 kg/m²     General: alert, appears stated age and cooperative   Wound: Wound clean and dry no evidence of infection. Motion: Painless Range of Motion   DVT Exam: No evidence of DVT seen on physical exam.       Knee swollen but thigh soft to palpation. Moving foot and ankle. Good distal pulses. Data Review  Recent Labs     04/19/22  0256 04/19/22  0628 04/20/22  0519   WBC 7.2 6.9 6.8   RBC 3.05* 3.08* 3.04*   HGB 9.4* 9.6* 9.4*   HCT 27.7* 28.1* 27.6*   MCV 90.8 91.0 90.9   MCH 30.8 31.1 30.8   MCHC 33.9 34.2 33.9   RDW 13.9 14.3 14.3    197 194         Assessment:     Status Post right Total Knee Arthroplasty. Doing well postoperatively.      Plan:      1: Continues current post-op course :  2:  Continue Deep venous thrombosis prophylaxis  3:  Continue physical therapy  4:  Continue Pain Control

## 2022-04-20 NOTE — DISCHARGE INSTR - COC
Continuity of Care Form    Patient Name: Juli Malin   :  1950  MRN:  [id number]    [facility] date:  2022  Discharge date:  ***    Code Status Order: Full Code   Advance Directives:   Advance Care Flowsheet Documentation       Date/Time Healthcare Directive Type of Healthcare Directive Copy in [address] Agent's Name Healthcare Agent's Phone Number    [phone] Yes, patient has an advance directive for healthcare treatment Durable power of  for health care;Living will;Health care treatment directive No, copy requested from family Adult [address] [facility] [place] [phone]            Admitting Physician:  Shad Dunn MD  PCP: Ita Grossman MD    Discharging Nurse: [facility] Unit/Room#: N46/N46-5  Discharging Unit Phone Number: ***    Emergency Contact:   Extended Emergency Contact Information  Primary Emergency Contact: [address], [address] Phone: [phone]  Mobile Phone: [phone]  Relation: Child  Secondary Emergency Contact: steven rice  [place] Phone: [phone]  Mobile Phone: [phone]  Relation: Child  Preferred language: English   needed?  No    Past Surgical History:  Past Surgical History:   Procedure Laterality Date    AXILLARY SURGERY Left 2021    EXCISION  OF LEFT AXILLARY MASS performed by Paula Pierre MD at [place] [address]      COLONOSCOPY  9/15/14,2011    negative, dr Ab Chisholm      negative    FOOT SURGERY  2009    JOINT REPLACEMENT Left     just surgery    KNEE ARTHROSCOPY  2006    MASTECTOMY, PARTIAL Right 2017    right partial mastectomy with SNB    THYROID SURGERY  2000    thyroidectomy-no Ca    TOTAL KNEE ARTHROPLASTY Left 2016    TOTAL KNEE ARTHROPLASTY Right 2022    RIGHT TOTAL KNEE  REPLACEMENT STANDARD, SUPINE, ADDUCTOR CANAL BLOCK, JUSTICE PERSONA -ROBIN  TO BE LATEX FREE performed by Shad Dunn MD at [facility] TUBAL LIGATION  1977       Immunization History:   Immunization History   Administered Date(s) Administered    Influenza, Quadv, IM, (6 mo and older Fluzone, Flulaval, Fluarix and 3 yrs and older Afluria) 10/19/2016    Pneumococcal Conjugate 13-valent (Franceen Sarks) 02/27/2017    Pneumococcal Polysaccharide ([id number]) 07/06/2018    Zoster Live (Zostavax) 05/01/2013       Active Problems:  Patient Active Problem List   Diagnosis Code    Hypothyroidism E03.9    Postmenopausal Z78.0    Osteoarthritis M19.90    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    Axillary mass, left R22.32    Palpable mass of soft tissue of shoulder M79.89    Status post total knee replacement, right Z96.651       Isolation/Infection:   Isolation            No Isolation          Patient Infection Status       None to display            Nurse Assessment:  Last Vital Signs: /62 Comment: manual  Pulse 54   Temp 97.9 °F (36.6 °C) (Oral)   Resp 18   Ht 5' 5\" (1.651 m)   Wt 166 lb 3.2 oz (75.4 kg)   LMP 01/10/2005 (Approximate)   SpO2 93%   BMI 27.66 kg/m²     Last documented pain score (0-10 scale): Pain Level: 3  Last Weight:   Wt Readings from Last 1 Encounters:   04/18/22 166 lb 3.2 oz (75.4 kg)     Mental Status:  {IP PT MENTAL STATUS:60696}    IV Access:  { MANJU IV ACCESS:506874662}    Nursing Mobility/ADLs:  Walking   {[facility] CCPC:644087864}  Transfer  {[facility] JLXN:513642527}  Bathing  {[facility] IOFR:221640962}  Dressing  {[facility] ELJQ:405783756}  Toileting  {[facility] LHQK:118823800}  Feeding  {[facility] ZZZN:236550315}  Med Admin  {[facility] XMQS:196491998}  Med Delivery   { MANJU MED Delivery:502365743}    Wound Care Documentation and Therapy:  Incision 09/28/21 Back Lateral;Left;Upper (Active)   Number of days: 204       Incision 04/18/22 Knee Anterior;Right (Active)   Dressing Status Clean;Dry; Intact 04/20/22 0727   Dressing Change Due 04/25/22 04/19/22 1931   Incision Cleansed Not Cleansed 04/20/22 0408   Dressing/Treatment Adhesive bandage 22 07   Drainage Amount None 22 0727   Odor None 22 07   Number of days: 2        Elimination:  Continence: Bowel: {YES / DW:07853}  Bladder: {YES / FZ:98581}  Urinary Catheter: {Urinary Catheter:504681026}   Colostomy/Ileostomy/Ileal Conduit: {YES / XF:67848}       Date of Last BM: ***    Intake/Output Summary (Last 24 hours) at 2022 1101  Last data filed at 2022 0408  Gross per 24 hour   Intake 1560 ml   Output --   Net 1560 ml     I/O last 3 completed shifts:   In:  [P.O.:; I.V.:10]  Out: -     Safety Concerns:     [employer] Safety Concerns:725766525}    Impairments/Disabilities:      [employer] Impairments/Disabilities:653010437}    Nutrition Therapy:  Current Nutrition Therapy:   [employer] Diet List:608552448}    Routes of Feeding: {CHP DME Other Feedings:757425795}  Liquids: {Slp liquid thickness:33109}  Daily Fluid Restriction: {CHP DME Yes amt example:485112536}  Last Modified Barium Swallow with Video (Video Swallowing Test): {Done Not Done QFBJ:605971565}    Treatments at the Time of Hospital Discharge:   Respiratory Treatments: ***  Oxygen Therapy:  {Therapy; copd oxygen:63339}  Ventilator:    { CC Vent FLD}    Rehab Therapies: {THERAPEUTIC INTERVENTION:4217243608}  Weight Bearing Status/Restrictions: [employer] Weight Bearin}  Other Medical Equipment (for information only, NOT a DME order):  {EQUIPMENT:659171596}  Other Treatments: ***    Patient's personal belongings (please select all that are sent with patient):  {CHP DME Belongings:439894156}    RN SIGNATURE:  {Esignature:575842112}    CASE MANAGEMENT/SOCIAL WORK SECTION    Inpatient Status Date: ***    Readmission Risk Assessment Score:  Readmission Risk              Risk of Unplanned Readmission:  0           Discharging to Facility/ Agency   Name: [facility]  Address:  [address] [place] [address]  Fax:    Dialysis Facility (if applicable) Name:  Address:  Dialysis Schedule:  Phone:  Fax:    / signature: {Esignature:962096533}    PHYSICIAN SECTION    Prognosis: {Prognosis:6553720884}    Condition at Discharge: Mela Connelly Patient Condition:804146002}    Rehab Potential (if transferring to Rehab): {Prognosis:0792082565}    Recommended Labs or Other Treatments After Discharge: ***    Physician Certification: I certify the above information and transfer of [place] Board  is necessary for the continuing treatment of the diagnosis listed and that she requires {Admit to Appropriate Level of Care:08570} for {GREATER/LESS:011908530} 30 days.      Update Admission H&P: {CHP DME Changes in [place]:384850855}    PHYSICIAN SIGNATURE:  {Esignature:679207640}

## 2022-04-20 NOTE — PROGRESS NOTES
Physical Therapy Med Surg Daily Treatment Note  Facility/Department: [facility]  Room: N226/N226-01       NAME: Cheyanne Sykes  : 1950 (9 y.o.)  MRN: [id number]  CODE STATUS: Full Code    Date of Service: 2022    Patient Diagnosis(es): Status post total knee replacement, right [Z96.651]   No chief complaint on file. 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    Osteoarthritis 2013    Hypothyroidism 10/05/2012    Postmenopausal 10/05/2012        Past Medical History:   Diagnosis Date    Breast cancer ([facility] [address] 2017    radiation s/p x 20    Hematuria     History of MRI of brain and brain stem 2011    normal    Hyperlipidemia     Hypothyroidism     Shingles     Urinary incontinence      Past Surgical History:   Procedure Laterality Date    AXILLARY SURGERY Left 2021    EXCISION  OF LEFT AXILLARY MASS performed by aNvi Decker MD at [place] [address]      [facility]  9/15/14,2011    negative, dr Alber Gunter      negative    FOOT SURGERY  2009    JOINT REPLACEMENT Left     just surgery    KNEE ARTHROSCOPY  2006    MASTECTOMY, PARTIAL Right 2017    right partial mastectomy with SNB    THYROID SURGERY  2000    thyroidectomy-no Ca    TOTAL KNEE ARTHROPLASTY Left 2016    TOTAL KNEE ARTHROPLASTY Right 2022    RIGHT TOTAL KNEE  REPLACEMENT STANDARD, SUPINE, ADDUCTOR CANAL BLOCK, JUSTICE PERSONA -ROBIN  TO BE LATEX FREE performed by Emi Woodruff MD at [address] [place] [address]       Chart Reviewed: Yes  Family / Caregiver Present: Yes  General Comment  Comments: RN removing pt's IV stating dc soon.     Restrictions:  Restrictions/Precautions: Fall Risk;Weight Bearing (high hayden score)  Lower Extremity Weight Bearing Restrictions  Right Lower Extremity Weight Bearing: Weight Bearing As Tolerated    SUBJECTIVE:   Subjective: \"I am doing okay. \"    Pain  Pain: 5/10 R knee. OBJECTIVE:        Bed mobility  Supine to Sit: Supervision  Comment: increased time and effort to complete, vc's for improved sequencing. Transfers  Sit to Stand: Stand by assistance  Stand to sit: Stand by assistance  Comment: cues for safe hand placement; WW used    Ambulation  Surface: level tile  Device: Rolling Walker  Assistance: Stand by assistance  Quality of Gait: antalgic step through pattern R  Gait Deviations: Slow Shi  Distance: 15'  Comments: pt declines further distance stating she just walked to the restroom. PT Exercises  A/AROM Exercises: AROM LAQ x 10 sitting EOB. focus on increasing ROM. Activity Tolerance  Activity Tolerance: Patient tolerated treatment well    Patient Education  Education Given To: Patient  Education Provided: Home Exercise Program     ASSESSMENT   Assessment: timothy ash, pt voices confidence in homegoing at [address] [facility],[address]. Discharge Recommendations:  Continue to assess pending progress         Goals  Short Term Goals  Short term goal 1: Pt will demonstrate HEP indep  Short term goal 2: Pt will demonstrate R knee AROM 0-90 deg  Long Term Goals  Long term goal 1: Pt will demonstrate bed mobility indep  Long term goal 2: Pt will demonstrate transfers mod indep with safest AD  Long term goal 3: Pt will demonstrate amb >/= 150ft with safest AD mod indep.   Long term goal 4: Pt will demonstrate stair negotiation 3 steps with 1 rail mod indep    PLAN    Times per week: 5-7  Times per day: Twice a day  Safety Devices  Type of Devices: Left in chair,Call light within reach,All fall risk precautions in place,Chair alarm in place,Nurse notified     [facility] (6 CLICK) Dominik Washington 28 Inpatient Mobility Raw Score : 18      Therapy Time   Individual   Time In [facility]   Time Out 1331   Minutes 9 Gait: 8  BM/Trsf: 1        Renae Sorto PTA, 04/20/22 at 1:48 PM         Definitions for assistance levels  Independent = pt does not require any physical supervision or assistance from another person for activity completion. Device may be needed.   Stand by assistance = pt requires verbal cues or instructions from another person, close to but not touching, to perform the activity  Minimal assistance= pt performs 75% or more of the activity; assistance is required to complete the activity  Moderate assistance= pt performs 50% of the activity; assistance is required to complete the activity  Maximal assistance = pt performs 25% of the activity; assistance is required to complete the activity  Dependent = pt requires total physical assistance to accomplish the task

## 2022-04-20 NOTE — PROGRESS NOTES
Progress Note  Date:2022       [facility]  Patient Santiago Scales     YOB: 1950     Age:71 y.o. Subjective    No acute events overnight. Had a low blood pressure measured this morning, but improved to SBP >100 on manual recheck. We will continue to monitor closely. Denies any lightheadedness or dizziness. No new/acute complaints at present. Hemoglobin 9.4, stable from 9.6 previous day, down from 12.1 preoperatively. Objective         Vitals Last 24 Hours:  TEMPERATURE:  Temp  Av.8 °F (36.6 °C)  Min: 97.5 °F (36.4 °C)  Max: 98.2 °F (36.8 °C)  RESPIRATIONS RANGE: Resp  Av.1  Min: 16  Max: 18  PULSE OXIMETRY RANGE: SpO2  Av %  Min: 93 %  Max: 100 %  PULSE RANGE: Pulse  Av.6  Min: 51  Max: 67  BLOOD PRESSURE RANGE: Systolic (72PLO), QGN:729 , Min:77 , CAN:117   ; Diastolic (22HAF), AWP:48, Min:31, Max:62    I/O (24Hr): Intake/Output Summary (Last 24 hours) at 2022 0818  Last data filed at 2022 0408  Gross per 24 hour   Intake 2040 ml   Output --   Net 2040 ml     Objective:  Vital signs: (most recent): Blood pressure 106/62, pulse 54, temperature 97.9 °F (36.6 °C), temperature source Oral, resp. rate 18, height 5' 5\" (1.651 m), weight 166 lb 3.2 oz (75.4 kg), last menstrual period 01/10/2005, SpO2 93 %, not currently breastfeeding. Constitutional: Obese adult female reclining in bed no acute distress. Head: NCAT  Eyes: PERRLA, EOMI  Neck: Trachea midline, phonation normal  Cardiovascular: RRR. No tachycardia. No significant murmurs appreciated. Warm and well perfused peripherally. Pulmonary: Normal rate and effort respiration on room air. Abdomen: Obese, soft, nondistended. Normoactive bowel sounds. Neurologic: Grossly alert and oriented. Nonfocal.  Psychiatric: Pleasant, calm, cooperative  Extremities: RLE postoperative site with dressing C/C/I.   Neurovascularly intact distal to operative site on affected limb.      Labs/Imaging/Diagnostics    Labs:  CBC:  Recent Labs     04/19/22  0256 04/19/22  0628 04/20/22  0519   WBC 7.2 6.9 6.8   RBC 3.05* 3.08* 3.04*   HGB 9.4* 9.6* 9.4*   HCT 27.7* 28.1* 27.6*   MCV 90.8 91.0 90.9   RDW 13.9 14.3 14.3    197 194     CHEMISTRIES:  Recent Labs     04/19/22  0628      K 4.0      CO2 24   BUN 13   CREATININE 0.88   GLUCOSE 102*     PT/INR:No results for input(s): PROTIME, INR in the last 72 hours. APTT:No results for input(s): APTT in the last 72 hours. LIVER PROFILE:No results for input(s): AST, ALT, BILIDIR, BILITOT, ALKPHOS in the last 72 hours. Imaging Last 24 Hours:  No results found. Assessment//Plan           Hospital Problems           Last Modified POA    * (Principal) Status post total knee replacement, right 4/18/2022 Yes        Assessment & Plan    Summary:  80 y.o. female with PMH of  MH of remote breast CA s/p radiation/lumpectomy, hypothyroidism, and HLD presented with:     R knee OA S/p R TKR. Management per primary team     Post-operative anemia: No active bleeding. Not in transfusion range. Follow daily CBC. Hypothyroidism: Resume levothyroxine    Isolated hypotension: Check BP throughout shift. Goal SBP >90. Caution for lightheadedness/dizziness.   Electronically signed by Inocente Foster DO on 4/19/22 at 8:47 AM EDT

## 2022-04-20 NOTE — FLOWSHEET NOTE
Patient given home-going instructions. All follow-up appointments reviewed and prescriptions given. Home medication levothyroxine return to patient. No questions or concerns voiced at this present time. All belonging taken with patient.  Discharged in stable condition via w/c

## 2022-04-20 NOTE — DISCHARGE SUMMARY
Discharge summary  This patient Nicky Lopez was admitted to the hospital on 4/18/2022  after undergoing Procedure(s) (LRB):  RIGHT TOTAL KNEE  REPLACEMENT STANDARD, SUPINE, ADDUCTOR CANAL BLOCK, JUSTICE PERSONA -ROBIN  TO BE LATEX FREE (Right) without complications that morning. During the postoperative period,while in hospital, patient was medically managed by the hospitalist. Please see medial notes and H&P for patients additional diagnoses. Ortho agrees with all medical diagnoses and treatments while patient in hospital.    No significant or unexpected findings noted during hospital stay. ..... Hospital course  Patient tolerated surgical procedure well and there was no complications. Patient progressed adequtly through their recovery during hospital stay including PT and rehabilitation. DVT prophylaxis was implemented POD#1  Patient was then D/C on  to Home  in stable condition. Patient was instructed on the use of pain medications, the signs and symptoms of infection, and was given our number to call should they have any questions or concerns following discharge.

## 2022-04-28 RX ORDER — TIZANIDINE 4 MG/1
TABLET ORAL
Qty: 16 TABLET | Refills: 0 | OUTPATIENT
Start: 2022-04-28

## 2022-05-17 ENCOUNTER — HOSPITAL ENCOUNTER (OUTPATIENT)
Dept: PHYSICAL THERAPY | Age: 72
Setting detail: THERAPIES SERIES
Discharge: HOME | End: 2022-05-17
Payer: MEDICARE

## 2022-05-17 PROCEDURE — 97161 PT EVAL LOW COMPLEX 20 MIN: CPT

## 2022-05-17 ASSESSMENT — PAIN DESCRIPTION - DESCRIPTORS: DESCRIPTORS: ACHING;SORE

## 2022-05-17 ASSESSMENT — PAIN DESCRIPTION - FREQUENCY: FREQUENCY: CONTINUOUS

## 2022-05-17 ASSESSMENT — PAIN DESCRIPTION - LOCATION: LOCATION: KNEE

## 2022-05-17 ASSESSMENT — PAIN DESCRIPTION - ORIENTATION: ORIENTATION: RIGHT

## 2022-05-17 ASSESSMENT — PAIN SCALES - GENERAL: PAINLEVEL_OUTOF10: 5

## 2022-05-17 NOTE — PROGRESS NOTES
[facility], [address]     Ph: [phone]  Fax: [phone]      [x] Certification  [] Recertification [x]  Plan of Care  [] Progress Note [] Discharge      Referring Provider: Joyce Andre MD      From:  Jeanie Dwyer PT   Patient: Zak Lee (36 y.o. female) : 1950 Date: 2022   Medical Diagnosis: Unilateral primary osteoarthritis, right knee [M17.11]  Presence of right artificial knee joint [Z96.651]    Treatment Diagnosis: R knee pain, decreased R knee ROM, RLE edema, decreased RLE strength    Plan of Care/Certification Expiration Date: : 22   Progress Report Period from:  2022  to 2022    Visits to Date: 1 No Show: 0 Cancelled Appts: 0    OBJECTIVE:   Short Term Goals - Time Frame for Short term goals: 3 weeks    Goals Current/Discharge status  Status   Short term goal 1: R knee PROM 0-110 deg  AROM LLE (degrees)  L Knee Flexion 0-145: 125+  L Knee Extension 0: 0   AROM RLE (degrees)  R Knee Flexion 0-145: 87 deg  R Knee Extension 0: lacking 12          PROM RLE (degrees)  R Knee Flexion 0-145: 97 deg, open end feel with pain     New   Short term goal 2: Decrease in R knee pain to < 3/10 with daily activity  Pain Screening  Patient Currently in Pain: Yes  Pain Assessment: 0-10  Pain Level: 5  Best Pain Level: 2  Worst Pain Level: 10  Pain Location: Knee  Pain Orientation: Right  Pain Descriptors: Aching,Sore  Pain Frequency: Continuous  Pain Management/Relieving Factors: Rest,Ice     New     Long Term Goals - Time Frame for Long term goals : 4-6 weeks  Goals Current/ Discharge status Status   Long term goal 1: R knee P/AROM 0-115 deg to normalize gait mechanics and allow pain free reciprocal stair negotiation AROM LLE (degrees)  L Knee Flexion 0-145: 125+  L Knee Extension 0: 0   AROM RLE (degrees)  R Knee Flexion 0-145: 87 deg  R Knee Extension 0: lacking 12          PROM RLE (degrees)  R Knee Flexion 0-145: 97 deg, open end feel with pain     New   Long term goal 2: Js LE strength 5/5 to allow pain free squatting, bike riding, and stair negotiation Strength LLE  L Hip Flexion: 5/5  L Hip Extension: 5/5  L Hip ABduction: 5/5  L Hip ADduction: 5/5  L Knee Flexion: 5/5  L Knee Extension: 5/5     Strength RLE  R Hip Flexion: 4/5  R Hip Extension: 4/5  R Hip ABduction: 4/5  R Hip ADduction: 4/5  R Knee Flexion: 4/5  R Knee Extension: 4/5    New   Long term goal 3: 90% PLOF via subjective report or outcome survey Exam: LEFS: 20/80  Self reports 25% PLOF on intake form   New   Long term goal 4: [place] with HEP HEP to established and progressed working toward functional goals [employer], Functions, Activity Limitations Requiring Skilled Therapeutic Intervention: Increased pain,Decreased ROM,Decreased strength,Decreased balance,Decreased high-level IADLs  Assessment: Pt is a 81 yo female s/p R TKA 4/18/22. Pt presents with ongoing deficits of RLE edema, R knee pain, decreased R knee ROM, and mild strength deficits. Pt can benefit from PT services to improve the noted deficits to restore pain free motion and PLOF. Therapy Prognosis: Excellent      PT Education: PT Role;Plan of Care;Goals; Evaluative findings;Home Exercise Program    PLAN: [x] Evaluate and Treat  Frequency/Duration:  Plan Frequency: 1-2  Plan weeks: 4-6  Current Treatment Recommendations: ROM,Strengthening,Balance training,Functional mobility training,Gait training,Stair training,Neuromuscular re-education,Manual Therapy - Soft Tissue Mobilization,Manual Therapy - Joint Manipulation,Pain management,Home exercise program,Patient/Caregiver education & training,Modalities     Precautions:       S/p TKA                     Patient Status:[x] Continue/ Initiate plan of Care    [] Discharge PT. Recommend pt continue with HEP.      [] Additional visits requested, Please re-certify for additional visits:    [] Hold Signature: Electronically signed by Madison Madrid PT on 5/17/22 at 4:31 PM EDT      If you have any questions or concerns, please don't hesitate to call. Thank you for your referral.    I have reviewed this plan of care and certify a need for medically necessary rehabilitation services.     Physician Signature:__________________________________________________________  Date:  Please sign and return

## 2022-05-17 NOTE — PROGRESS NOTES
SUPINE, ADDUCTOR CANAL BLOCK, JUSTICE PERSONA -ROBIN  TO BE LATEX FREE performed by Meagan Segura MD at  [facility]       Medications:   Current Outpatient Medications:     aspirin 81 MG EC tablet, Take 1 tablet by mouth 2 times daily, Disp: 60 tablet, Rfl: 0    levothyroxine (SYNTHROID) 75 MCG tablet, TAKE 1 TABLET DAILY, Disp: 90 tablet, Rfl: 1  Allergies: Patient has no known allergies. SUBJECTIVE EXAMINATION     History obtained from[de-identified] Patient,Chart Review,           Subjective History: Onset Date: 04/18/22  Subjective: Pt s/p R TKA 4/18/22. Had [facility] 79 PT prior OP. RTD in 9 weeks. Still taking pain medication and frequently using ice. Using cane PRN, primarily for community distances. Goal for therapy is to be able to walk, ride bike, and garden w/o pain.   Additional Pertinent Hx (if applicable):            Learning/Language: Learning  Does the patient/guardian have any barriers to learning?: No barriers  Will there be a co-learner?: No  What is the preferred language of the patient/guardian?: English  Is an  required?: No  How does the patient/guardian prefer to learn new concepts?: Listening,Reading,Demonstration,Pictures/Videos     Pain Screening    Pain Screening  Patient Currently in Pain: Yes  Pain Assessment: 0-10  Pain Level: 5  Best Pain Level: 2  Worst Pain Level: 10  Pain Location: Knee  Pain Orientation: Right  Pain Descriptors: Aching,Sore  Pain Frequency: Continuous  Pain Management/Relieving Factors: Rest,Ice    Functional Status    Social History:    Social History  Lives With:  (granddaughter)  Type of Home: House  Home Layout: One level,Laundry in basement  Home Access: Stairs to enter with rails  Entrance Stairs - Number of Steps: 3  Home Equipment: Cane,Walker, rolling    Occupation/Interests:   Occupation: Retired  Leisure & Hobbies: walking, biking, gardening    Prior Level of Function:     Independent        Current Level of Function:          ADL Assistance: Independent  Homemaking Assistance: Independent  Ambulation Assistance: Independent  Transfer Assistance: Independent  Active : Yes         OBJECTIVE EXAMINATION     Review of Systems:  Vision: Impaired  Visual Deficits: Wears glasses  Hearing: Exceptions to [facility]  Hearing Exceptions: Bilateral hearing aid    Palpation:   Right Knee Palpation: generalized edema    Mobility:   Bed mobility  Supine to Sit: Independent  Sit to Supine: Independent     Transfers  Sit to Stand: Independent  Stand to sit:  Independent  Ambulation  Surface: carpet  Device: No Device  Assistance: Independent  Gait Deviations:  (R knee flexed during stance phase)  Distance: 50-75ft intervals throughout clinic  Comments: using cane for community  Stairs/Curb  Stairs?: Yes  Stairs  # Steps : 12  Stairs Height: 6\"  Rails: Left ascending  Assistance: Modified independent   Comment: reciprocal pattern; mild pain in R knee reported with eccentric loading      Left AROM  Right AROM         AROM LLE (degrees)  L Knee Flexion 0-145: 125+  L Knee Extension 0: 0    AROM RLE (degrees)  R Knee Flexion 0-145: 87 deg  R Knee Extension 0: lacking 12        Left PROM  Right PROM              PROM RLE (degrees)  R Knee Flexion 0-145: 97 deg, open end feel with pain        Left Strength  Right Strength         Strength LLE  L Hip Flexion: 5/5  L Hip Extension: 5/5  L Hip ABduction: 5/5  L Hip ADduction: 5/5  L Knee Flexion: 5/5  L Knee Extension: 5/5    Strength RLE  R Hip Flexion: 4/5  R Hip Extension: 4/5  R Hip ABduction: 4/5  R Hip ADduction: 4/5  R Knee Flexion: 4/5  R Knee Extension: 4/5     Outcomes Score:  Exam: LEFS: 20/80    Treatment:    Exercises:   Exercises  Exercise 1: *bike  Exercise 2: *knee flexion with OP  Exercise 3: *TKEs  Exercise 4: *SAQs  Exercise 5: *box step up / step down  Exercise 6: *TG squats  Exercise 7: *balance drills (hurdles, SLS)     Modalities:  Modalities: Yes  Cryotherapy (CPT 08856)  Patient Position: Seated  Number Minutes Cryotherapy: 10  Cryotherapy location: Right,Knee  Post treatment skin assessment: Intact     Manual:  Manual Therapy  Joint Mobilization: *patellar mobs  Soft Tissue Mobilizaton: *RLE edema mgmt     *Indicates exercise,modality, or manual techniques to be initiated when appropriate       ASSESSMENT     Impression: Assessment: Pt is a 81 yo female s/p R TKA 4/18/22. Pt presents with ongoing deficits of RLE edema, R knee pain, decreased R knee ROM, and mild strength deficits. Pt can benefit from PT services to improve the noted deficits to restore pain free motion and PLOF. Body Structures, Functions, Activity Limitations Requiring Skilled Therapeutic Intervention: Increased pain,Decreased ROM,Decreased strength,Decreased balance,Decreased high-level IADLs    Statement of Medical Necessity: Physical Therapy is both indicated and medically necessary as outlined in the POC to increase the likelihood of meeting the functionally related goals stated below.      Patient's Activity Tolerance: Patient tolerated evaluation without incident      Patient's rehabilitation potential/prognosis is considered to be: Excellent    Factors which may impact rehabilitation potential include: None     Patient Education: PT Raina Lobe of Care,Goals,Evaluative findings,Home Exercise Program      GOALS   Patient Goal(s): Patient goals : be able to walk, bike, and garden w/o pain    Short Term Goals Completed by 3 weeks Goal Status   R knee PROM 0-110 deg New   Decrease in R knee pain to < 3/10 with daily activity New                         Long Term Goals Completed by 4-6 weeks Goal Status   R knee P/AROM 0-115 deg to normalize gait mechanics and allow pain free reciprocal stair negotiation New   Sj LE strength 5/5 to allow pain free squatting, bike riding, and stair negotiation New   90% PLOF via subjective report or outcome survey New   [place] with HEP New          TREATMENT PLAN       Requires PT Follow-Up: Yes    Treatment may include any combination of the following: ROM,Strengthening,Balance training,Functional mobility training,Gait training,Stair training,Neuromuscular re-education,Manual Therapy - Soft Tissue Mobilization,Manual Therapy - Joint Manipulation,Pain management,Home exercise program,Patient/Caregiver education & training,Modalities     Frequency / Duration:  Patient to be seen 1-2 times per week for 4-6 weeks  Plan Comment:               Eval Complexity:   Decision Making: Low Complexity  History: Personal Factors and/or Comorbidities Impacting POC: Low  Examination of body system(s) including body structures and functions, activity limitations, and/or participation restrictions: Low  Exam: LEFS: 20/80  Clinical Presentation: Low    POST-PAIN     Pain Rating (0-10 pain scale):  5 /10  Location and pain description same as pre-treatment unless indicated. Action: [] NA  [] Call Physician  [x] Perform HEP  [x] Meds as prescribed    Evaluation and patient rights have been reviewed and patient agrees with plan of care. Yes  [x]  No  []   Explain:     [place] Fall Risk Assessment  Risk Factor Scale  Score   History of Falls [] Yes  [x] No 25  0    Secondary Diagnosis [] Yes  [x] No 15  0    Ambulatory Aid [] Furniture  [x] Crutches/cane/walker  [] None/bedrest/wheelchair/nurse 30  15  0 15   IV/Heparin Lock [] Yes  [x] No 20  0    Gait/Transferring [] Impaired  [] Weak  [x] Normal/bedrest/immobile 20  10  0    Mental Status [] Forgets limitations  [x] Oriented to own ability 15  0       Total: 15     Based on the Assessment score: check the appropriate box.   []  No intervention needed   Low =   Score of 0-24  [x]  Use standard prevention interventions Moderate =  Score of 24-44   [] Discuss fall prevention strategies   [] Indicate moderate falls risk on eval  []  Use high risk prevention interventions High = Score of 45 and higher   [] Discuss fall prevention strategies   [] Provide supervision during treatment time      Minutes:  PT Individual Minutes  Time In: 1525  Time Out: 1610  Minutes: 45  Timed Code Treatment Minutes: 5 Minutes  Procedure Minutes: 40 min eval      Timed Activity Minutes Units   Ther Ex 5 0       Electronically signed by Alicja Bautista PT on 5/17/22 at 4:17 PM EDT

## 2022-05-19 ENCOUNTER — HOSPITAL ENCOUNTER (OUTPATIENT)
Dept: PHYSICAL THERAPY | Age: 72
Setting detail: THERAPIES SERIES
Discharge: HOME | End: 2022-05-19
Payer: MEDICARE

## 2022-05-19 NOTE — PROGRESS NOTES
Therapy                            Cancellation/No-show Note    Date: 2022  Patient: Stormy Figueroa (74 y.o. female)  : 1950  MRN:  [id number]  Referring Physician: Shell Healy MD     Medical Diagnosis: Unilateral primary osteoarthritis, right knee [M17.11]  Presence of right artificial knee joint [Z96.651]      Visit Information:  Insurance: Payor: Edda Shape / Plan: Arlen White PPO / Product Type: Medicare /   Visits to Date: 1   No Show/Cancelled Appts: 0 /       For today's appointment patient:  [x]  Cancelled  []  Rescheduled appointment  []  No-show   []  Called pt to remind of next appointment     Reason given by patient:  []  Patient ill  []  Conflicting appointment  []  No transportation    []  Conflict with work  []  No reason given  [x]  Other:  Pt called and cancelled via voicemail message    [x] Pt has future appointments scheduled, no follow up needed  [] Pt requests to be on hold.     Reason:   If > 2 weeks please discuss with therapist.  [] Therapist to call pt for follow up        Signature: Electronically signed by Melva Lee PT on 22 at 8:05 AM EDT

## 2022-05-24 ENCOUNTER — HOSPITAL ENCOUNTER (OUTPATIENT)
Dept: PHYSICAL THERAPY | Age: 72
Setting detail: THERAPIES SERIES
Discharge: HOME | End: 2022-05-24
Payer: MEDICARE

## 2022-05-24 PROCEDURE — 97140 MANUAL THERAPY 1/> REGIONS: CPT

## 2022-05-24 PROCEDURE — 97110 THERAPEUTIC EXERCISES: CPT

## 2022-05-24 ASSESSMENT — PAIN SCALES - GENERAL: PAINLEVEL_OUTOF10: 5

## 2022-05-24 ASSESSMENT — PAIN DESCRIPTION - ORIENTATION: ORIENTATION: RIGHT

## 2022-05-24 ASSESSMENT — PAIN DESCRIPTION - LOCATION: LOCATION: KNEE

## 2022-05-24 ASSESSMENT — PAIN DESCRIPTION - DESCRIPTORS: DESCRIPTORS: ACHING;SORE

## 2022-05-24 NOTE — PROGRESS NOTES
[facility]  Outpatient Physical Therapy    Treatment Note        Date: 2022  Patient: Cheyanne Sykes  : 1950   Confirmed: Yes  MRN: [id number]  Referring Provider: Yojana Trotter MD   Secondary Referring Provider (If applicable):     Medical Diagnosis: Unilateral primary osteoarthritis, right knee [M17.11]  Presence of right artificial knee joint [Z96.651]    Treatment Diagnosis: R knee pain, decreased R knee ROM, RLE edema, decreased RLE strength    Visit Information:  Insurance: Payor: Salvador Valentine / Plan: Ignacio Mejia PPO / Product Type: Medicare /   PT Visit Information  Onset Date: 22  Total # of Visits to Date: 2  Plan of Care/Certification Expiration Date: 22  No Show: 0  Progress Note Due Date: 22  Canceled Appointment: 1  Progress Note Counter:     Subjective Information:  Subjective: Pt reports 5/10 sore achy right knee pain. HEP Compliance:  [x] Good [] Fair [] Poor [] Reports not doing due to:    Pain Screening  Patient Currently in Pain: Yes  Pain Level: 5  Pain Location: Knee  Pain Orientation: Right  Pain Descriptors: Aching,Sore    Treatment:  Exercises:  Exercises  Exercise 1: bike seat 3 x 5 minutes  Exercise 4: SAQs 5 sec x 15  Exercise 7: balance drills hurdles, SLS x 3  Exercise 8: HKF & HS at stairs 30 sec x 3       Manual:   Manual Therapy  Joint Mobilization: *patellar mobs  Soft Tissue Mobilizaton: RLE edema mgmt & STM, PROM with distraction and OP. 9 minutes total       Modalities:  Cryotherapy (CPT 24162)  Patient Position: Seated  Number Minutes Cryotherapy: 10  Cryotherapy location: Right,Knee  Post treatment skin assessment: Intact       *Indicates exercise, modality, or manual techniques to be initiated when appropriate    Objective Measures:      Strength: [x] NT  [] MMT completed:     ROM: [] NT  [x] ROM measurements:         AROM RLE (degrees)  R Knee Flexion 0-145: 92 deg             Assessment:    Body Structures, Functions, Activity Limitations Requiring Skilled Therapeutic Intervention: Increased pain,Decreased ROM,Decreased strength,Decreased balance,Decreased high-level IADLs  Assessment: Pt started 6 minutes late due to clinic error. Initiated mobility and LE strengthening exercises without complaint of pain. Seated knee flexion right improved post manual. Concluded with  to decrease inflammation. Treatment Diagnosis: R knee pain, decreased R knee ROM, RLE edema, decreased RLE strength     Post-Pain Assessment:       Pain Rating (0-10 pain scale):  2 /10   Location and pain description same as pre-treatment unless indicated. Action: [] NA   [x] Perform HEP  [] Meds as prescribed  [] Modalities as prescribed   [] Call Physician     GOALS   Patient Goal(s): Patient goals : be able to walk, bike, and garden w/o pain    Short Term Goals Completed by 3 weeks Goal Status   STG 1 R knee PROM 0-110 deg In progress   STG 2 Decrease in R knee pain to < 3/10 with daily activity In progress       Long Term Goals Completed by 4-6 weeks Goal Status   LTG 1 R knee P/AROM 0-115 deg to normalize gait mechanics and allow pain free reciprocal stair negotiation In progress   LTG 2 Sj LE strength 5/5 to allow pain free squatting, bike riding, and stair negotiation In progress   LTG 3 90% PLOF via subjective report or outcome survey In progress   LTG 4 [place] with HEP In progress          Plan:  Frequency/Duration:  Plan  Plan Frequency: 1-2  Plan weeks: 4-6  Current Treatment Recommendations: ROM,Strengthening,Balance training,Functional mobility training,Gait training,Stair training,Neuromuscular re-education,Manual Therapy - Soft Tissue Mobilization,Manual Therapy - Joint Manipulation,Pain management,Home exercise program,Patient/Caregiver education & training,Modalities  Pt to continue current HEP. See objective section for any therapeutic exercise changes, additions or modifications this date.     Therapy Time:      PT Individual Minutes  Time In: 1006  Time Out: 1050  Minutes: 44  Timed Code Treatment Minutes: 33 Minutes  Procedure Minutes:CP 10  Timed Activity Minutes Units   Ther Ex 24 1   Manual  9 1     Electronically signed by Suhas Amanda PTA on 5/24/22 at 11:59 AM EDT

## 2022-05-26 ENCOUNTER — HOSPITAL ENCOUNTER (OUTPATIENT)
Dept: PHYSICAL THERAPY | Age: 72
Setting detail: THERAPIES SERIES
Discharge: HOME | End: 2022-05-26
Payer: MEDICARE

## 2022-05-26 PROCEDURE — 97110 THERAPEUTIC EXERCISES: CPT

## 2022-05-26 ASSESSMENT — PAIN DESCRIPTION - DESCRIPTORS: DESCRIPTORS: ACHING;SORE

## 2022-05-26 ASSESSMENT — PAIN DESCRIPTION - LOCATION: LOCATION: KNEE

## 2022-05-26 ASSESSMENT — PAIN SCALES - GENERAL: PAINLEVEL_OUTOF10: 6

## 2022-05-26 ASSESSMENT — PAIN DESCRIPTION - ORIENTATION: ORIENTATION: RIGHT

## 2022-05-26 NOTE — PROGRESS NOTES
[facility]  Outpatient Physical Therapy    Treatment Note        Date: 2022  Patient: Alexus Bailon  : 1950   Confirmed: Yes  MRN: [id number]  Referring Provider: Dennis Diaz MD   Secondary Referring Provider (If applicable):     Medical Diagnosis: Unilateral primary osteoarthritis, right knee [M17.11]  Presence of right artificial knee joint [Z96.651]    Treatment Diagnosis: R knee pain, decreased R knee ROM, RLE edema, decreased RLE strength    Visit Information:  Insurance: Payor: Nicoleletybam Kristan / Plan: Maria Luisa Dent PPO / Product Type: Medicare /   PT Visit Information  Onset Date: 22  Total # of Visits to Date: 4  Plan of Care/Certification Expiration Date: 22  No Show: 0  Progress Note Due Date: 22  Canceled Appointment: 1  Progress Note Counter:     Subjective Information:  Subjective: Pt reports knee is sore today. She has been ramping up home activity, stating she has been walking a lot more. Using cane only when feels fatigued.   HEP Compliance:  [x] Good [] Fair [] Poor [] Reports not doing due to:    Pain Screening  Patient Currently in Pain: Yes  Pain Assessment: 0-10  Pain Level: 6  Pain Location: Knee  Pain Orientation: Right  Pain Descriptors: Aching,Sore    Treatment:  Exercises:  Exercises  Exercise 1: ankle DF, YTB x 20 (knee extended)  Exercise 2: ankle DF, GTB x 30 (knee extended)  Exercise 3: prone hang knee extension: 4 minutes  Exercise 4: SAQs 5 sec x 20  Exercise 5: TG squats: L6, 3 x 15  Exercise 6: TG heel raises: L6, x 25-30 continuous reps  Exercise 7: recumbent bike: x6 for knee ROM (maintaining > 50 RPM)       Manual: light STM along tib anterior up along lateral side of knee x 4 min (edema/pain mgmt)          Modalities:  Cryotherapy (CPT 02339)  Patient Position: Seated  Number Minutes Cryotherapy: 10  Cryotherapy location: Right,Knee  Post treatment skin assessment: Intact       *Indicates exercise, modality, or manual techniques to be initiated when appropriate    Objective Measures:     Strength: [x] NT  [] MMT completed:    ROM: [] NT  [] ROM measurements:         AROM RLE (degrees)  R Knee Flexion 0-145: 87 deg flex, seated  R Knee Extension 0: lacking 12 deg ext          PROM RLE (degrees)  R Knee Flexion 0-145: 94 deg flex, seated; open end feel with pain  R Knee Extension 0: lacking 8 deg ext, supine; open end feel with pain      Assessment: Body Structures, Functions, Activity Limitations Requiring Skilled Therapeutic Intervention: Increased pain,Decreased ROM,Decreased strength,Decreased balance,Decreased high-level IADLs  Assessment: Knee P/AROM still limited by pain at terminal flex and extension. New passive stretches implemented into program for pt to continue 2x daily at home. ROM improvements displayed as exercises progressed. Pt progressing off cane, displaying good gait stability on level hunter despite knee ROM limitations. Treatment Diagnosis: R knee pain, decreased R knee ROM, RLE edema, decreased RLE strength  Therapy Prognosis: Excellent          Post-Pain Assessment:       Pain Rating (0-10 pain scale):  \"good\" /10   Location and pain description same as pre-treatment unless indicated.    Action: [] NA   [x] Perform HEP  [x] Meds as prescribed  [x] Modalities as prescribed   [] Call Physician     GOALS   Patient Goal(s): Patient goals : be able to walk, bike, and garden w/o pain    Short Term Goals Completed by 3 weeks Goal Status   STG 1 R knee PROM 0-110 deg In progress   STG 2 Decrease in R knee pain to < 3/10 with daily activity In progress   STG 3       STG 4       STG 5           Long Term Goals Completed by 4-6 weeks Goal Status   LTG 1 R knee P/AROM 0-115 deg to normalize gait mechanics and allow pain free reciprocal stair negotiation In progress   LTG 2 Sj LE strength 5/5 to allow pain free squatting, bike riding, and stair negotiation In progress   LTG 3 90% PLOF via subjective report or outcome survey In progress   LTG 4 [place] with HEP In progress   LTG 5       LTG 6               Plan:  Frequency/Duration:  Plan  Plan Frequency: 1-2  Plan weeks: 4-6  Current Treatment Recommendations: ROM,Strengthening,Balance training,Functional mobility training,Gait training,Stair training,Neuromuscular re-education,Manual Therapy - Soft Tissue Mobilization,Manual Therapy - Joint Manipulation,Pain management,Home exercise program,Patient/Caregiver education & training,Modalities  Pt to continue current HEP. See objective section for any therapeutic exercise changes, additions or modifications this date.     Therapy Time:      PT Individual Minutes  Time In: 1300  Time Out: 1350  Minutes: 50  Timed Code Treatment Minutes: 39 Minutes  Procedure Minutes: 10 min CP  Timed Activity Minutes Units   Ther Ex 35 3   Manual  4 0     Electronically signed by Larry Kwan PT on 5/26/22 at 1:48 PM EDT

## 2022-05-27 ENCOUNTER — APPOINTMENT (OUTPATIENT)
Dept: PHYSICAL THERAPY | Age: 72
End: 2022-05-27
Payer: MEDICARE

## 2022-05-31 ENCOUNTER — HOSPITAL ENCOUNTER (OUTPATIENT)
Dept: PHYSICAL THERAPY | Age: 72
Setting detail: THERAPIES SERIES
Discharge: HOME | End: 2022-05-31
Payer: MEDICARE

## 2022-05-31 PROCEDURE — 97110 THERAPEUTIC EXERCISES: CPT

## 2022-05-31 PROCEDURE — 97140 MANUAL THERAPY 1/> REGIONS: CPT

## 2022-05-31 ASSESSMENT — PAIN SCALES - GENERAL: PAINLEVEL_OUTOF10: 3

## 2022-05-31 ASSESSMENT — PAIN DESCRIPTION - LOCATION: LOCATION: KNEE

## 2022-05-31 ASSESSMENT — PAIN DESCRIPTION - ORIENTATION: ORIENTATION: RIGHT

## 2022-05-31 ASSESSMENT — PAIN DESCRIPTION - DESCRIPTORS: DESCRIPTORS: SORE

## 2022-05-31 NOTE — PROGRESS NOTES
[facility]  Outpatient Physical Therapy    Treatment Note        Date: 2022  Patient: Carolin Javier  : 1950   Confirmed: Yes  MRN: [id number]  Referring Provider: Alec Medrano MD   Secondary Referring Provider (If applicable):     Medical Diagnosis: Unilateral primary osteoarthritis, right knee [M17.11]  Presence of right artificial knee joint [Z96.651]    Treatment Diagnosis: R knee pain, decreased R knee ROM, RLE edema, decreased RLE strength    Visit Information:  Insurance: Payor: Susan Galvez / Plan: Luke Mates PPO / Product Type: Medicare /   PT Visit Information  Onset Date: 22  Total # of Visits to Date: 5  Plan of Care/Certification Expiration Date: 22  No Show: 0  Progress Note Due Date: 22  Canceled Appointment: 1  Progress Note Counter:     Subjective Information:  Subjective: R knee still feels sore, but not as bad as last week. Decreased pain in lower R leg noted after massage LV.   HEP Compliance:  [x] Good [] Fair [] Poor [] Reports not doing due to:    Pain Screening  Patient Currently in Pain: Yes  Pain Assessment: 0-10  Pain Level: 3  Pain Location: Knee  Pain Orientation: Right  Pain Descriptors: Sore    Treatment:  Exercises:  Exercises  Exercise 2: ankle PF, RTB 3 x 15 (knee extended)  Exercise 4: LAQ: 5\" x 20  Exercise 7: recumbent bike: x 5 for knee ROM (maintaining > 50 RPM)       Manual:   Manual Therapy  Joint Mobilization: patellar mobs  Soft Tissue Mobilizaton: peripatellar STM, focus on medial side of knee to reduce edema  Other: passive HS and knee flexion stretches; manual total x 15 min       Modalities:  Cryotherapy (CPT 07947)  Patient Position: Seated  Number Minutes Cryotherapy: 10  Cryotherapy location: Right,Knee  Post treatment skin assessment: Redness - no adverse reaction       *Indicates exercise, modality, or manual techniques to be initiated when appropriate    Objective Measures:     Strength: [x] NT  [] MMT completed:    ROM: [] NT  [] ROM measurements:         AROM RLE (degrees)  R Knee Flexion 0-145: 97 deg flex, seated          PROM RLE (degrees)  R Knee Flexion 0-145: 104 deg flex, seated                        Assessment: Body Structures, Functions, Activity Limitations Requiring Skilled Therapeutic Intervention: Increased pain,Decreased ROM,Decreased strength,Decreased balance,Decreased high-level IADLs  Assessment: Knee P/AROM measures improving since last visit. Pain still noted at end ranges. Pt encouraged to continue stretches for ROM improvement. Manual provided to continue reducing knee edema. CP applied at completion of session. Treatment Diagnosis: R knee pain, decreased R knee ROM, RLE edema, decreased RLE strength  Therapy Prognosis: Excellent          Post-Pain Assessment:       Pain Rating (0-10 pain scale):  4 /10   Location and pain description same as pre-treatment unless indicated.    Action: [] NA   [x] Perform HEP  [x] Meds as prescribed  [x] Modalities as prescribed   [] Call Physician     GOALS   Patient Goal(s): Patient goals : be able to walk, bike, and garden w/o pain    Short Term Goals Completed by 3 weeks Goal Status   STG 1 R knee PROM 0-110 deg In progress   STG 2 Decrease in R knee pain to < 3/10 with daily activity In progress       Long Term Goals Completed by 4-6 weeks Goal Status   LTG 1 R knee P/AROM 0-115 deg to normalize gait mechanics and allow pain free reciprocal stair negotiation In progress   LTG 2 Sj LE strength 5/5 to allow pain free squatting, bike riding, and stair negotiation In progress   LTG 3 90% PLOF via subjective report or outcome survey In progress   LTG 4 [place] with HEP In progress            Plan:  Frequency/Duration:  Plan  Plan Frequency: 1-2  Plan weeks: 4-6  Current Treatment Recommendations: ROM,Strengthening,Balance training,Functional mobility training,Gait training,Stair training,Neuromuscular re-education,Manual Therapy - Soft Tissue Mobilization,Manual Therapy - Joint Manipulation,Pain management,Home exercise program,Patient/Caregiver education & training,Modalities  Pt to continue current HEP. See objective section for any therapeutic exercise changes, additions or modifications this date.     Therapy Time:      PT Individual Minutes  Time In: 1300  Time Out: 1350  Minutes: 50  Timed Code Treatment Minutes: 38 Minutes  Procedure Minutes: 10 min CP  Timed Activity Minutes Units   Ther Ex 23 2   Manual  15 1     Electronically signed by Balbir Tony PT on 5/31/22 at 1:56 PM EDT

## 2022-06-02 ENCOUNTER — HOSPITAL ENCOUNTER (OUTPATIENT)
Dept: PHYSICAL THERAPY | Age: 72
Setting detail: THERAPIES SERIES
Discharge: HOME | End: 2022-06-02
Payer: MEDICARE

## 2022-06-02 PROCEDURE — 97140 MANUAL THERAPY 1/> REGIONS: CPT

## 2022-06-02 PROCEDURE — 97110 THERAPEUTIC EXERCISES: CPT

## 2022-06-02 ASSESSMENT — PAIN DESCRIPTION - LOCATION: LOCATION: KNEE

## 2022-06-02 ASSESSMENT — PAIN DESCRIPTION - ORIENTATION: ORIENTATION: RIGHT

## 2022-06-02 ASSESSMENT — PAIN SCALES - GENERAL: PAINLEVEL_OUTOF10: 4

## 2022-06-02 ASSESSMENT — PAIN DESCRIPTION - DESCRIPTORS: DESCRIPTORS: SORE

## 2022-06-02 ASSESSMENT — PAIN DESCRIPTION - FREQUENCY: FREQUENCY: CONTINUOUS

## 2022-06-02 NOTE — PROGRESS NOTES
[facility]  Outpatient Physical Therapy    Treatment Note        Date: 2022  Patient: Huma Dover  : 1950   Confirmed: Yes  MRN: [id number]  Referring Provider: Humera French MD   Secondary Referring Provider (If applicable):     Medical Diagnosis: Unilateral primary osteoarthritis, right knee [M17.11]  Presence of right artificial knee joint [Z96.651]    Treatment Diagnosis: R knee pain, decreased R knee ROM, RLE edema, decreased RLE strength    Visit Information:  Insurance: Payor: Amanda Fabian / Plan: Mahendra Box PPO / Product Type: Medicare /   PT Visit Information  Onset Date: 22  Total # of Visits to Date: 6  Plan of Care/Certification Expiration Date: 22  No Show: 0  Progress Note Due Date: 22  Canceled Appointment: 1  Progress Note Counter:     Subjective Information:  Subjective: Patient presents to outpatient PT with no assistive device. Patient reports \"bending\" is still an issue. Patient reports improvement noted since starting outpatient PT, however, continues to demonstrate decreased endurance.   HEP Compliance:  [] Good [x] Fair [] Poor [x] Reports not doing due to: \"depression & anxiety\"    Pain Screening  Patient Currently in Pain: Yes  Pain Assessment: 0-10  Pain Level: 4 (3-4/10)  Pain Location: Knee  Pain Orientation: Right  Pain Descriptors: Sore  Pain Frequency: Continuous    Treatment:  Exercises:  Exercises  Exercise 7: recumbent bike: x 6 for knee AROM (maintaining > 50 RPM)  Exercise 9: supine quad stretch with strap, 1 set x 5 reps x 20-30 second hold  Exercise 10: prone quad stretch, 1 set x 5 reps x 20-30 second hold  Exercise 11: TKE with YTB standing, 2 sets x 10 reps RLE with 5 second hold each rep  Exercise 12: small rocker board 3-way, 1 set x 20 reps each direction to encourge improved right knee ROM  Exercise 20: HEP: continue with current + TKE & quad stretches     Manual:   Manual Therapy  Other: supine, right knee joint distraction with over pressure technique used during PROM, x10 minutes total manual     Modalities:  Cryotherapy (CPT 31113)  Patient Position: Seated  Number Minutes Cryotherapy: 10  Cryotherapy location: Right,Knee  Post treatment skin assessment: Redness - no adverse reaction     *Indicates exercise, modality, or manual techniques to be initiated when appropriate    Objective Measures:     Strength: [x] NT  [] MMT completed:    ROM: [] NT  [x] ROM measurements:    AROM RLE (degrees)  R Knee Flexion 0-145: 98 deg flex, seated  R Knee Extension 0: lacking 15 deg ext      PROM RLE (degrees)  R Knee Flexion 0-145: 102 deg flex, supine following manual    Assessment: Body Structures, Functions, Activity Limitations Requiring Skilled Therapeutic Intervention: Increased pain,Decreased ROM,Decreased strength,Decreased balance,Decreased high-level IADLs  Assessment: Right knee P/AROM measures staying around the same measurements as last visit with patient admitting to not performing exercises/stretches as often as she knows she should have since last visit. Addition of two quad stretches in supine & prone as well as TKE initiated this date to encourage continued improvement in right knee ROM. Education provided to patient about the importance of performing exercises/stretches in PT HEP regularly every day in order to encourage faster & longer results post surgery. Concluded with CP for inflammation & pain. Treatment Diagnosis: R knee pain, decreased R knee ROM, RLE edema, decreased RLE strength  Therapy Prognosis: Excellent    Post-Pain Assessment:       Pain Rating (0-10 pain scale):   3-4/10   Location and pain description same as pre-treatment unless indicated.    Action: [] NA   [x] Perform HEP  [] Meds as prescribed  [x] Modalities as prescribed   [] Call Physician     GOALS   Patient Goal(s): Patient goals : be able to walk, bike, and garden w/o pain    Short Term Goals Completed by 3 weeks Goal Status STG 1 R knee PROM 0-110 deg In progress   STG 2 Decrease in R knee pain to < 3/10 with daily activity In progress     Long Term Goals Completed by 4-6 weeks Goal Status   LTG 1 R knee P/AROM 0-115 deg to normalize gait mechanics and allow pain free reciprocal stair negotiation In progress   LTG 2 Sj LE strength 5/5 to allow pain free squatting, bike riding, and stair negotiation In progress   LTG 3 90% PLOF via subjective report or outcome survey In progress   LTG 4 [place] with HEP In progress     Plan:  Frequency/Duration:  Plan  Plan Frequency: 1-2  Plan weeks: 4-6  Current Treatment Recommendations: ROM,Strengthening,Balance training,Functional mobility training,Gait training,Stair training,Neuromuscular re-education,Manual Therapy - Soft Tissue Mobilization,Manual Therapy - Joint Manipulation,Pain management,Home exercise program,Patient/Caregiver education & training,Modalities  Pt to continue current HEP. See objective section for any therapeutic exercise changes, additions or modifications this date.     Therapy Time:      PT Individual Minutes  Time In: 9686  Time Out: 2825  Minutes: 48  Timed Code Treatment Minutes: 38 Minutes  Procedure Minutes: 10 minutes CP  Timed Activity Minutes Units   Ther Ex 28 2   Manual  10 1     Electronically signed by Margarita Canchola PT on 6/2/22 at 11:59 AM EDT

## 2022-06-07 ENCOUNTER — HOSPITAL ENCOUNTER (OUTPATIENT)
Dept: PHYSICAL THERAPY | Age: 72
Setting detail: THERAPIES SERIES
Discharge: HOME | End: 2022-06-07
Payer: MEDICARE

## 2022-06-07 PROCEDURE — 97110 THERAPEUTIC EXERCISES: CPT

## 2022-06-07 PROCEDURE — 97140 MANUAL THERAPY 1/> REGIONS: CPT

## 2022-06-07 ASSESSMENT — PAIN DESCRIPTION - ORIENTATION: ORIENTATION: RIGHT

## 2022-06-07 ASSESSMENT — PAIN DESCRIPTION - DESCRIPTORS: DESCRIPTORS: ACHING

## 2022-06-07 ASSESSMENT — PAIN DESCRIPTION - LOCATION: LOCATION: KNEE

## 2022-06-07 ASSESSMENT — PAIN SCALES - GENERAL: PAINLEVEL_OUTOF10: 4

## 2022-06-07 NOTE — PROGRESS NOTES
[facility]  Outpatient Physical Therapy    Treatment Note        Date: 2022  Patient: Kenny Holland  : 1950   Confirmed: Yes  MRN: [id number]  Referring Provider: Angela Slaughter MD   Secondary Referring Provider (If applicable):     Medical Diagnosis: Unilateral primary osteoarthritis, right knee [M17.11]  Presence of right artificial knee joint [Z96.651]    Treatment Diagnosis: R knee pain, decreased R knee ROM, RLE edema, decreased RLE strength    Visit Information:  Insurance: Payor: [employer] / Plan: [id number] PPO / Product Type: Medicare /   PT Visit Information  Onset Date: 22  Total # of Visits to Date: 7  Plan of Care/Certification Expiration Date: 22  No Show: 0  Progress Note Due Date: 22  Canceled Appointment: 1  Progress Note Counter:     Subjective Information:  Subjective: Pt reports did too much yesterday. HEP Compliance:  [x] Good [] Fair [] Poor [] Reports not doing due to:    Pain Screening  Patient Currently in Pain: Yes  Pain Level: 4  Pain Location: Knee  Pain Orientation: Right  Pain Descriptors: Aching    Treatment:  Exercises:  Exercises  Exercise 1: ankle DF, RTB x 30 (knee extended)  Exercise 2: ankle PF, RTB x 30(knee extended)  Exercise 7: recumbent bike: x 6 for knee AROM s9 (maintaining > 50 RPM)  Exercise 9: supine quad stretch with strap, 1 set x 5 reps x 20-30 second hold  Exercise 10: prone quad stretch, 1 set x 5 reps x 20-30 second hold  Exercise 12: small rocker board 3-way, 1 set x 20 reps each direction to encourge improved right knee ROM  Exercise 13: Seated knee flexion with RTB x 5 minute       Manual:   Manual Therapy  Joint Mobilization: patellar mobs  Soft Tissue Mobilizaton: Distal quad seated .  Posterior knee in supine  Other: PROM with distraction x11 minutes total manual       Modalities:  Cryotherapy (CPT 64248)  Patient Position: Seated  Number Minutes Cryotherapy: 10  Cryotherapy location: Right,Knee  Post treatment skin assessment: Redness - no adverse reaction       *Indicates exercise, modality, or manual techniques to be initiated when appropriate    Objective Measures:      Strength: [x] NT  [] MMT completed:     ROM: [] NT  [x] ROM measurements:         AROM RLE (degrees)  R Knee Flexion 0-145: 101 deg flex, seated  R Knee Extension 0: lacking 11 deg ext          Assessment: Body Structures, Functions, Activity Limitations Requiring Skilled Therapeutic Intervention: Increased pain,Decreased ROM,Decreased strength,Decreased balance,Decreased high-level IADLs  Assessment: Continue to progress current exercises with focus on ROM. Added seated knee flexion stretch with RTB and verbal cues to relax. Post manual AROM measurements improved both flexion and extension. Discussed moving knee every 20 minutes or so while traveling to decrease stiffness. Concluded with CP. Treatment Diagnosis: R knee pain, decreased R knee ROM, RLE edema, decreased RLE strength             Post-Pain Assessment:       Pain Rating (0-10 pain scale):  0 /10   Location and pain description same as pre-treatment unless indicated.    Action: [] NA   [x] Perform HEP  [] Meds as prescribed  [x] Modalities as prescribed   [] Call Physician     GOALS   Patient Goal(s): Patient goals : be able to walk, bike, and garden w/o pain    Short Term Goals Completed by 3 weeks Goal Status   STG 1 R knee PROM 0-110 deg In progress   STG 2 Decrease in R knee pain to < 3/10 with daily activity In progress       Long Term Goals Completed by 4-6 weeks Goal Status   LTG 1 R knee P/AROM 0-115 deg to normalize gait mechanics and allow pain free reciprocal stair negotiation In progress   LTG 2 Sj LE strength 5/5 to allow pain free squatting, bike riding, and stair negotiation In progress   LTG 3 90% PLOF via subjective report or outcome survey In progress   LTG 4 [place] with HEP In progress          Plan:  Frequency/Duration:  Plan  Plan Frequency: 1-2  Plan weeks: 4-6  Current Treatment Recommendations: ROM,Strengthening,Balance training,Functional mobility training,Gait training,Stair training,Neuromuscular re-education,Manual Therapy - Soft Tissue Mobilization,Manual Therapy - Joint Manipulation,Pain management,Home exercise program,Patient/Caregiver education & training,Modalities  Pt to continue current HEP. See objective section for any therapeutic exercise changes, additions or modifications this date.     Therapy Time:      PT Individual Minutes  Time In: 3045  Time Out: 1003  Minutes: 53  Timed Code Treatment Minutes: 41 Minutes  Procedure Minutes:CP 10  Timed Activity Minutes Units   Ther Ex 30 2   Manual  11 1     Electronically signed by Oscar Preston PTA on 6/7/22 at 10:05 AM EDT

## 2022-06-10 ENCOUNTER — HOSPITAL ENCOUNTER (OUTPATIENT)
Dept: PHYSICAL THERAPY | Age: 72
Setting detail: THERAPIES SERIES
Discharge: HOME | End: 2022-06-10
Payer: MEDICARE

## 2022-06-10 PROCEDURE — 97140 MANUAL THERAPY 1/> REGIONS: CPT

## 2022-06-10 PROCEDURE — 97110 THERAPEUTIC EXERCISES: CPT

## 2022-06-10 ASSESSMENT — PAIN DESCRIPTION - ORIENTATION: ORIENTATION: RIGHT

## 2022-06-10 ASSESSMENT — PAIN SCALES - GENERAL: PAINLEVEL_OUTOF10: 3

## 2022-06-10 ASSESSMENT — PAIN DESCRIPTION - DESCRIPTORS: DESCRIPTORS: ACHING

## 2022-06-10 ASSESSMENT — PAIN DESCRIPTION - LOCATION: LOCATION: KNEE

## 2022-06-10 NOTE — PROGRESS NOTES
[facility]  Outpatient Physical Therapy    Treatment Note        Date: 6/10/2022  Patient: Chastity Mccabe  : 1950   Confirmed: Yes  MRN: [id number]  Referring Provider: Hubert Morgan MD   Secondary Referring Provider (If applicable):     Medical Diagnosis: Unilateral primary osteoarthritis, right knee [M17.11]  Presence of right artificial knee joint [Z96.651]    Treatment Diagnosis: R knee pain, decreased R knee ROM, RLE edema, decreased RLE strength    Visit Information:  Insurance: Payor: Aleene Councilman / Plan: Claudia Gilliland PPO / Product Type: Medicare /   PT Visit Information  Onset Date: 22  Total # of Visits to Date: 8  Plan of Care/Certification Expiration Date: 22  No Show: 0  Progress Note Due Date: 22  Canceled Appointment: 1  Progress Note Counter:     Subjective Information:  Subjective: \"I went to hearing aid check and stubbed my toe. .. which jammed the knee\"  HEP Compliance:  [x] Good [] Fair [] Poor [] Reports not doing due to:    Pain Screening  Pain Level: 3  Pain Location: Knee  Pain Orientation: Right  Pain Descriptors: Aching    Treatment:  Exercises:  Exercises  Exercise 5: TG squats: L7, 3 x 15  Exercise 7: recumbent bike: x 6 for knee AROM S8 (maintaining > 50 RPM)  Exercise 8: HKF & HS at stairs 30 sec x 3  Exercise 9: supine quad stretch with strap, 1 set x 10 reps x 10 second hold  Exercise 10: prone quad stretch, 1 set x 5 reps x 20 second hold Pillow under Abd. Exercise 14: Hurdles 12\" L,R, lateral x 3  Exercise 20: HEP: continue with current       Manual:   Manual Therapy  Soft Tissue Mobilizaton: Distal quad seated .  Posterior knee in supine  Other: PROM with distraction x 9 minutes total manual       Modalities:  Cryotherapy (CPT 43177)  Patient Position: Seated  Number Minutes Cryotherapy: 10  Cryotherapy location: Right,Knee  Post treatment skin assessment: Redness - no adverse reaction       *Indicates exercise, modality, or manual techniques to be initiated when appropriate    Objective Measures:      Strength: [x] NT  [] MMT completed:     ROM: [] NT  [x] ROM measurements:         AROM RLE (degrees)  R Knee Flexion 0-145: 102 deg flex, seated  R Knee Extension 0: lacking 10 deg ext          PROM RLE (degrees)  R Knee Flexion 0-145: 112 deg flex, seated following manual       Assessment: Body Structures, Functions, Activity Limitations Requiring Skilled Therapeutic Intervention: Increased pain,Decreased ROM,Decreased strength,Decreased balance,Decreased high-level IADLs  Assessment: Continue to progress current exercises with focus on ROM. Added seated  Hurdles \"12 lead Right, Left and lateral to improve gait and balance. Decreased seat didtance to improve ROM. AROM right knee improved both flexion and ext. Concluded with CP. Treatment Diagnosis: R knee pain, decreased R knee ROM, RLE edema, decreased RLE strength             Post-Pain Assessment:       Pain Rating (0-10 pain scale):   0/10   Location and pain description same as pre-treatment unless indicated.    Action: [] NA   [] Perform HEP  [] Meds as prescribed  [] Modalities as prescribed   [] Call Physician     GOALS   Patient Goal(s): Patient goals : be able to walk, bike, and garden w/o pain    Short Term Goals Completed by 3 weeks Goal Status   STG 1 R knee PROM 0-110 deg In progress   STG 2 Decrease in R knee pain to < 3/10 with daily activity In progress       Long Term Goals Completed by 4-6 weeks Goal Status   LTG 1 R knee P/AROM 0-115 deg to normalize gait mechanics and allow pain free reciprocal stair negotiation In progress   LTG 2 Sj LE strength 5/5 to allow pain free squatting, bike riding, and stair negotiation In progress   LTG 3 90% PLOF via subjective report or outcome survey In progress   LTG 4 [place] with HEP In progress          Plan:  Frequency/Duration:  Plan  Plan Frequency: 1-2  Plan weeks: 4-6  Current Treatment Recommendations: ROM,Strengthening,Balance training,Functional mobility training,Gait training,Stair training,Neuromuscular re-education,Manual Therapy - Soft Tissue Mobilization,Manual Therapy - Joint Manipulation,Pain management,Home exercise program,Patient/Caregiver education & training,Modalities  Pt to continue current HEP. See objective section for any therapeutic exercise changes, additions or modifications this date.     Therapy Time:      PT Individual Minutes  Time In: 1791  Time Out: 1005  Minutes: 53  Timed Code Treatment Minutes: 42 Minutes  Procedure Minutes:CP 10  Timed Activity Minutes Units   Ther Ex 33 2   Manual  9 1     Electronically signed by Stefany Harper PTA on 6/10/22 at 10:16 AM EDT

## 2022-06-14 ENCOUNTER — HOSPITAL ENCOUNTER (OUTPATIENT)
Dept: PHYSICAL THERAPY | Age: 72
Setting detail: THERAPIES SERIES
Discharge: HOME | End: 2022-06-14
Payer: MEDICARE

## 2022-06-14 PROCEDURE — 97110 THERAPEUTIC EXERCISES: CPT

## 2022-06-14 PROCEDURE — 97140 MANUAL THERAPY 1/> REGIONS: CPT

## 2022-06-14 ASSESSMENT — PAIN SCALES - GENERAL: PAINLEVEL_OUTOF10: 5

## 2022-06-14 ASSESSMENT — PAIN DESCRIPTION - LOCATION: LOCATION: KNEE

## 2022-06-14 NOTE — PROGRESS NOTES
[facility]  Outpatient Physical Therapy    Treatment Note        Date: 2022  Patient: Mary Mckenna  : 1950   Confirmed: Yes  MRN: [id number]  Referring Provider: Tiffani Medel MD   Secondary Referring Provider (If applicable):     Medical Diagnosis: Unilateral primary osteoarthritis, right knee [M17.11]  Presence of right artificial knee joint [Z96.651]    Treatment Diagnosis: R knee pain, decreased R knee ROM, RLE edema, decreased RLE strength    Visit Information:  Insurance: Payor: Delia Toth / Plan: Damián Shearer PPO / Product Type: Medicare /   PT Visit Information  Onset Date: 22  Total # of Visits to Date: 9  Plan of Care/Certification Expiration Date: 22  No Show: 0  Progress Note Due Date: 22  Canceled Appointment: 1  Progress Note Counter:     Subjective Information:  Subjective: Pt reports lots of walking over the weekend has aggrivated her right knee. Pt reports increase in swelling and stiffness since last visit.   HEP Compliance:  [x] Good [] Fair [] Poor [] Reports not doing due to:    Pain Screening  Patient Currently in Pain: Yes  Pain Assessment: 0-10  Pain Level: 5  Pain Location: Knee  Pain Orientation: Right,Anterior,Posterior,Distal    Treatment:  Exercises:  Exercises  Exercise 1: ankle DF, RTB x 30 (knee extended)  Exercise 2: ankle PF, RTB x 30(knee extended)  Exercise 5: step ups L/F and step downs on 6\" step x10  Exercise 7: recumbent bike: x 6 for knee AROM S8 (maintaining > 50 RPM)  Exercise 8: HKF & HS at stairs 30 sec x 3  Exercise 13: Seated HS curl with RTB x12  Exercise 20: HEP: continue with current       Manual:   Manual Therapy  Soft Tissue Mobilizaton: TB prone to posterior knee, supine to distal quad       Modalities:  Cryotherapy (CPT 95641)  Patient Position: Seated  Number Minutes Cryotherapy: 10  Cryotherapy location: Right,Knee  Post treatment skin assessment: Redness - no adverse reaction    Assessment: Body Structures, Functions, Activity Limitations Requiring Skilled Therapeutic Intervention: Increased pain,Decreased ROM,Decreased strength,Decreased balance,Decreased high-level IADLs  Assessment: Continued with knee strengthening and ROM exercises. Progress pt with step ups L/F and step downs. Pt was able to complete x12 step ups, but reports increased tightness in quad with step downs and was only able to complete 7 reps. Treatment concluded with TB STM to posterior/anterior knee and CP for swelling and pain. Treatment Diagnosis: R knee pain, decreased R knee ROM, RLE edema, decreased RLE strength     Post-Pain Assessment:       Pain Rating (0-10 pain scale):   4/10   Location and pain description same as pre-treatment unless indicated. Action: [] NA   [x] Perform HEP  [x] Meds as prescribed  [x] Modalities as prescribed   [] Call Physician     GOALS   Patient Goal(s): Patient goals : be able to walk, bike, and garden w/o pain    Short Term Goals Completed by 3 weeks Goal Status   STG 1 R knee PROM 0-110 deg In progress   STG 2 Decrease in R knee pain to < 3/10 with daily activity In progress       Long Term Goals Completed by 4-6 weeks Goal Status   LTG 1 R knee P/AROM 0-115 deg to normalize gait mechanics and allow pain free reciprocal stair negotiation In progress   LTG 2 Sj LE strength 5/5 to allow pain free squatting, bike riding, and stair negotiation In progress   LTG 3 90% PLOF via subjective report or outcome survey In progress   LTG 4 [place] with HEP In progress            Plan:  Frequency/Duration:  Plan  Plan Frequency: 1-2  Plan weeks: 4-6  Current Treatment Recommendations: ROM,Strengthening,Balance training,Functional mobility training,Gait training,Stair training,Neuromuscular re-education,Manual Therapy - Soft Tissue Mobilization,Manual Therapy - Joint Manipulation,Pain management,Home exercise program,Patient/Caregiver education & training,Modalities  Pt to continue current HEP. See objective section for any therapeutic exercise changes, additions or modifications this date.     Therapy Time:      PT Individual Minutes  Time In: 0915  Time Out: 1005  Minutes: 50  Timed Code Treatment Minutes: 40 Minutes  Procedure Minutes: CP 10min  Timed Activity Minutes Units   Ther Ex 32 2   Manual  8 1     Electronically signed by Armando Mei PTA on 6/14/22 at 10:11 AM EDT

## 2022-06-17 ENCOUNTER — HOSPITAL ENCOUNTER (OUTPATIENT)
Dept: PHYSICAL THERAPY | Age: 72
Setting detail: THERAPIES SERIES
Discharge: HOME | End: 2022-06-17
Payer: MEDICARE

## 2022-06-17 PROCEDURE — 97110 THERAPEUTIC EXERCISES: CPT

## 2022-06-17 PROCEDURE — 97140 MANUAL THERAPY 1/> REGIONS: CPT

## 2022-06-17 ASSESSMENT — PAIN DESCRIPTION - LOCATION: LOCATION: KNEE

## 2022-06-17 ASSESSMENT — PAIN DESCRIPTION - ORIENTATION: ORIENTATION: RIGHT

## 2022-06-17 ASSESSMENT — PAIN SCALES - GENERAL: PAINLEVEL_OUTOF10: 3

## 2022-06-17 ASSESSMENT — PAIN DESCRIPTION - DESCRIPTORS: DESCRIPTORS: ACHING;SORE

## 2022-06-17 NOTE — PROGRESS NOTES
[facility]  Outpatient Physical Therapy    Treatment Note        Date: 2022  Patient: Antwon Lira  : 1950   Confirmed: Yes  MRN: [id number]  Referring Provider: Linda Call MD   Secondary Referring Provider (If applicable):     Medical Diagnosis: Unilateral primary osteoarthritis, right knee [M17.11]  Presence of right artificial knee joint [Z96.651]    Treatment Diagnosis: R knee pain, decreased R knee ROM, RLE edema, decreased RLE strength    Visit Information:  Insurance: Payor: [address] / Plan: Marialuisa Guzmán PPO / Product Type: Medicare /   PT Visit Information  Onset Date: 22  Total # of Visits to Date: 10  Plan of Care/Certification Expiration Date: 22  No Show: 0  Progress Note Due Date: 22  Canceled Appointment: 1  Progress Note Counter: 10/12    Subjective Information:  Subjective: Pt reports R knee continues to feel sore and achy, but \"I'm doing what I want to do\". Pt expresses readiness for PT discharge to HEP at this time as she plans to continue HEP and using pool for ongoing exercise. HEP Compliance:  [x] Good [] Fair [] Poor [] Reports not doing due to:    Pain Screening  Patient Currently in Pain: Yes  Pain Assessment: 0-10  Pain Level: 3  Pain Location: Knee  Pain Orientation: Right  Pain Descriptors: Aching,Sore    Treatment:  Exercises:  Exercises  Exercise 1: ROM/strength measures  Exercise 2: Reviewed HEP for continued home completion  Exercise 3: education and training: floor transfers  Treatment Reasoning  Limitations addressed:  Mobility,Flexibility  Therapist provided: Assistance,Verbal cuing,Manual cuing    Manual:   Manual Therapy  Joint Mobilization: R knee patellar mobs, sup/inf  Soft Tissue Mobilizaton: jose c-patellar STM for edema mgmt; education for home completion  Other: Manual x 10 min  Treatment Reasoning  Limitations addressed: Tissue extensibility,Edema    Modalities: declined, will ice at home (recommend 2-3x, daily)      Objective Measures:     Ambulation  Surface: carpet  Device: No Device  Assistance: Independent    Strength: [] NT  [] MMT completed:  Strength RLE  R Knee Flexion: 5/5  R Knee Extension: 5/5    ROM: [] NT  [x] ROM measurements:  AROM RLE (degrees)  R Knee Flexion 0-145: 95 deg, supine; 103 seated  R Knee Extension 0: lacking 8 deg, supine          PROM RLE (degrees)  R Knee Flexion 0-145: 108 deg, seated      Assessment: Body Structures, Functions, Activity Limitations Requiring Skilled Therapeutic Intervention: Increased pain,Decreased ROM,Decreased strength,Decreased balance,Decreased high-level IADLs  Assessment: 10 outpatient PT visits completed to date. Pt expresses readiness for PT discharge to HEP while utilizing fitness center and pool closer to home. Time today spent reviewing pt's HEP with emphasis placed on ROM exercises and massage techniques to reduce ongoing knee edema. Pt displays good functional strength and balance for gait and stair climbing reciprocally. Pt expresses no concerns or questions about ongoing independent management. D/C PT POC. Treatment Diagnosis: R knee pain, decreased R knee ROM, RLE edema, decreased RLE strength  Therapy Prognosis: Excellent          Post-Pain Assessment:       Pain Rating (0-10 pain scale):  3 /10   Location and pain description same as pre-treatment unless indicated.    Action: [] NA   [x] Perform HEP  [x] Meds as prescribed  [x] Modalities as prescribed   [] Call Physician     GOALS   Patient Goal(s): Patient goals : be able to walk, bike, and garden w/o pain    Short Term Goals Completed by 3 weeks Goal Status   STG 1 R knee PROM 0-110 deg Met   STG 2 Decrease in R knee pain to < 3/10 with daily activity Partially met       Long Term Goals Completed by 4-6 weeks Goal Status   LTG 1 R knee P/AROM 0-115 deg to normalize gait mechanics and allow pain free reciprocal stair negotiation Partially met   LTG 2 Sj LE strength 5/5 to allow pain free squatting, bike riding, and stair negotiation Met   LTG 3 90% PLOF via subjective report or outcome survey Partially met   LTG 4 [place] with HEP Met          Plan:  Frequency/Duration:  Plan  Plan Comment: D/C PT POC  Pt to continue current HEP. See objective section for any therapeutic exercise changes, additions or modifications this date.     Therapy Time:      PT Individual Minutes  Time In: 3934  Time Out: 9982  Minutes: 34  Timed Code Treatment Minutes: 34 Minutes  Procedure Minutes:0  Timed Activity Minutes Units   Ther Ex 24 1   Manual  10 1     Electronically signed by Kimberly Marcelino PT on 6/17/22 at 10:01 AM EDT

## 2022-06-17 NOTE — PROGRESS NOTES
[address]     Ph: [phone]  Fax: [phone]      [] Certification  [] Recertification []  Plan of Care  [] Progress Note [x] Discharge      Referring Provider: Shell Healy MD      From:  [facility],    Patient: Stormy Figueroa (57 y.o. female) : 1950 Date: 2022   Medical Diagnosis: Unilateral primary osteoarthritis, right knee [M17.11]  Presence of right artificial knee joint [Z96.651]    Treatment Diagnosis: R knee pain, decreased R knee ROM, RLE edema, decreased RLE strength    Plan of Care/Certification Expiration Date: : 22   Progress Report Period from:  2022  to 2022    Visits to Date: 10 No Show: 0 Cancelled Appts: 1    OBJECTIVE:   Short Term Goals - Time Frame for Short term goals: 3 weeks    Goals Current/Discharge status  Status   Short term goal 1: R knee PROM 0-110 deg  AROM RLE (degrees)  R Knee Flexion 0-145: 95 deg, supine; 103 seated  R Knee Extension 0: lacking 8 deg, supine          PROM RLE (degrees)  R Knee Flexion 0-145: 108 deg, seated     Met   Short term goal 2: Decrease in R knee pain to < 3/10 with daily activity  Pain Screening  Patient Currently in Pain: Yes  Pain Assessment: 0-10  Pain Level: 3  Pain Location: Knee  Pain Orientation: Right  Pain Descriptors: Aching,Sore     Partially met     Long Term Goals - Time Frame for Long term goals : 4-6 weeks  Goals Current/ Discharge status Status   Long term goal 1: R knee P/AROM 0-115 deg to normalize gait mechanics and allow pain free reciprocal stair negotiation AROM RLE (degrees)  R Knee Flexion 0-145: 95 deg, supine; 103 seated  R Knee Extension 0: lacking 8 deg, supine          PROM RLE (degrees)  R Knee Flexion 0-145: 108 deg, seated     Partially met   Long term goal 2: Sj LE strength 5/5 to allow pain free squatting, bike riding, and stair negotiation Strength RLE  R Knee Flexion: 5/5  R Knee Extension: 5/5     Reciprocal stair negotiation   Met   Long term goal 3: 90% PLOF via subjective report or outcome survey 65% subjectively reported  LEFS: 56/80   Partially met   Long term goal 4: Burns with HEP Independent with HEP Met       Body Structures, Functions, Activity Limitations Requiring Skilled Therapeutic Intervention: Increased pain,Decreased ROM,Decreased strength,Decreased balance,Decreased high-level IADLs  Assessment: 10 outpatient PT visits completed to date. Pt expresses readiness for PT discharge to [facility] while utilizing fitness center and pool closer to home. Time today spent reviewing pt's HEP with emphasis placed on ROM exercises and massage techniques to reduce ongoing knee edema. Pt displays good functional strength and balance for gait and stair climbing reciprocally. Pt expresses no concerns or questions about ongoing independent management. D/C PT POC. Therapy Prognosis: Excellent           PLAN:   Frequency/Duration:  Plan Comment: D/C PT POC                     Patient Status:[] Continue/ Initiate plan of Care    [x] Discharge PT. Recommend pt continue with HEP. [] Additional visits requested, Please re-certify for additional visits:    [] Hold         Signature: Electronically signed by Jeanie Dwyer PT on 6/17/22 at 10:22 AM EDT      If you have any questions or concerns, please don't hesitate to call.   Thank you for your referral.

## 2023-03-30 PROBLEM — M76.31 ILIOTIBIAL BAND SYNDROME OF RIGHT SIDE: Status: ACTIVE | Noted: 2023-03-30

## 2023-03-30 PROBLEM — Z04.9 CONDITION NOT FOUND: Status: ACTIVE | Noted: 2023-03-30

## 2023-03-30 PROBLEM — M25.461 EFFUSION OF RIGHT KNEE: Status: ACTIVE | Noted: 2023-03-30

## 2023-03-30 PROBLEM — R30.0 DYSURIA: Status: ACTIVE | Noted: 2023-03-30

## 2023-03-30 PROBLEM — R32 BLADDER INCONTINENCE: Status: ACTIVE | Noted: 2023-03-30

## 2023-03-30 PROBLEM — N39.0 UTI (URINARY TRACT INFECTION): Status: ACTIVE | Noted: 2023-03-30

## 2023-03-30 PROBLEM — M25.562 KNEE PAIN, LEFT: Status: ACTIVE | Noted: 2023-03-30

## 2023-03-30 PROBLEM — N81.10 FEMALE BLADDER PROLAPSE: Status: ACTIVE | Noted: 2023-03-30

## 2023-03-30 PROBLEM — G89.29 CHRONIC PAIN OF RIGHT KNEE: Status: ACTIVE | Noted: 2023-03-30

## 2023-03-30 PROBLEM — C50.911 MALIGNANT NEOPLASM OF RIGHT FEMALE BREAST (MULTI): Status: ACTIVE | Noted: 2023-03-30

## 2023-03-30 PROBLEM — Z96.651 STATUS POST TOTAL RIGHT KNEE REPLACEMENT: Status: ACTIVE | Noted: 2023-03-30

## 2023-03-30 PROBLEM — M25.561 CHRONIC PAIN OF RIGHT KNEE: Status: ACTIVE | Noted: 2023-03-30

## 2023-03-30 PROBLEM — D17.1 LIPOMA OF BACK: Status: ACTIVE | Noted: 2023-03-30

## 2023-03-30 PROBLEM — E03.8 OTHER SPECIFIED HYPOTHYROIDISM: Status: ACTIVE | Noted: 2023-03-30

## 2023-03-30 RX ORDER — MELOXICAM 15 MG/1
1 TABLET ORAL DAILY PRN
COMMUNITY
Start: 2022-02-21

## 2023-03-30 RX ORDER — LEVOTHYROXINE SODIUM 75 UG/1
1 TABLET ORAL DAILY
COMMUNITY
Start: 2009-04-03 | End: 2023-06-05

## 2023-03-30 RX ORDER — NITROFURANTOIN 25; 75 MG/1; MG/1
1 CAPSULE ORAL EVERY 12 HOURS
COMMUNITY
Start: 2022-07-05 | End: 2023-04-01 | Stop reason: ALTCHOICE

## 2023-04-01 ENCOUNTER — OFFICE VISIT (OUTPATIENT)
Dept: PRIMARY CARE | Facility: CLINIC | Age: 73
End: 2023-04-01
Payer: MEDICARE

## 2023-04-01 VITALS
SYSTOLIC BLOOD PRESSURE: 108 MMHG | RESPIRATION RATE: 16 BRPM | OXYGEN SATURATION: 97 % | BODY MASS INDEX: 27.46 KG/M2 | DIASTOLIC BLOOD PRESSURE: 56 MMHG | HEIGHT: 65 IN | HEART RATE: 60 BPM | TEMPERATURE: 97.3 F

## 2023-04-01 DIAGNOSIS — Z00.00 ENCOUNTER FOR MEDICARE ANNUAL WELLNESS EXAM: Primary | ICD-10-CM

## 2023-04-01 DIAGNOSIS — M17.11 PRIMARY OSTEOARTHRITIS OF RIGHT KNEE: ICD-10-CM

## 2023-04-01 DIAGNOSIS — Z85.3 HISTORY OF BREAST CANCER: ICD-10-CM

## 2023-04-01 DIAGNOSIS — Z00.00 ROUTINE GENERAL MEDICAL EXAMINATION AT HEALTH CARE FACILITY: ICD-10-CM

## 2023-04-01 DIAGNOSIS — E03.8 OTHER SPECIFIED HYPOTHYROIDISM: ICD-10-CM

## 2023-04-01 PROBLEM — R30.0 DYSURIA: Status: RESOLVED | Noted: 2023-03-30 | Resolved: 2023-04-01

## 2023-04-01 PROBLEM — C50.911 MALIGNANT NEOPLASM OF RIGHT FEMALE BREAST (MULTI): Status: RESOLVED | Noted: 2023-03-30 | Resolved: 2023-04-01

## 2023-04-01 PROBLEM — M25.562 KNEE PAIN, LEFT: Status: RESOLVED | Noted: 2023-03-30 | Resolved: 2023-04-01

## 2023-04-01 PROBLEM — Z04.9 CONDITION NOT FOUND: Status: RESOLVED | Noted: 2023-03-30 | Resolved: 2023-04-01

## 2023-04-01 PROBLEM — N39.0 UTI (URINARY TRACT INFECTION): Status: RESOLVED | Noted: 2023-03-30 | Resolved: 2023-04-01

## 2023-04-01 PROCEDURE — G0439 PPPS, SUBSEQ VISIT: HCPCS | Performed by: FAMILY MEDICINE

## 2023-04-01 PROCEDURE — 1036F TOBACCO NON-USER: CPT | Performed by: FAMILY MEDICINE

## 2023-04-01 PROCEDURE — 99497 ADVNCD CARE PLAN 30 MIN: CPT | Performed by: FAMILY MEDICINE

## 2023-04-01 PROCEDURE — 1159F MED LIST DOCD IN RCRD: CPT | Performed by: FAMILY MEDICINE

## 2023-04-01 PROCEDURE — 1170F FXNL STATUS ASSESSED: CPT | Performed by: FAMILY MEDICINE

## 2023-04-01 PROCEDURE — 1160F RVW MEDS BY RX/DR IN RCRD: CPT | Performed by: FAMILY MEDICINE

## 2023-04-01 ASSESSMENT — PATIENT HEALTH QUESTIONNAIRE - PHQ9
SUM OF ALL RESPONSES TO PHQ9 QUESTIONS 1 AND 2: 0
2. FEELING DOWN, DEPRESSED OR HOPELESS: NOT AT ALL
1. LITTLE INTEREST OR PLEASURE IN DOING THINGS: NOT AT ALL

## 2023-04-01 ASSESSMENT — ACTIVITIES OF DAILY LIVING (ADL)
GROCERY_SHOPPING: INDEPENDENT
DRESSING: INDEPENDENT
DOING_HOUSEWORK: INDEPENDENT
BATHING: INDEPENDENT
TAKING_MEDICATION: INDEPENDENT
DRESSING: INDEPENDENT
MANAGING_FINANCES: INDEPENDENT
BATHING: INDEPENDENT

## 2023-04-01 ASSESSMENT — ENCOUNTER SYMPTOMS
LOSS OF SENSATION IN FEET: 0
OCCASIONAL FEELINGS OF UNSTEADINESS: 0
DEPRESSION: 0

## 2023-04-01 NOTE — PROGRESS NOTES
Subjective   Reason for Visit: Rasheeda Hawley is a 72 y.o. female here for a Medicare Wellness visit.   Covid vax: x 3  CRC: 9/2014-declines further  Mammogram: 2020-declines further ama  Pap: n/a  Lmp: n/a     Chief Complaint   Patient presents with    Medicare Annual Wellness Visit Subsequent       Past Medical, Surgical, and Family History reviewed and updated in chart.    Reviewed all medications by prescribing practitioner or clinical pharmacist (such as prescriptions, OTCs, herbal therapies and supplements) and documented in the medical record.  Medicare Annual Wellness Visit Subsequent  HPI    Patient Self Assessment of Health Status  Patient Self Assessment: Good    Nutrition and Exercise  Current Diet: Diabetic Diet  Adequate Fluid Intake: Yes  Caffeine: Yes  Exercise Frequency: Regularly    Functional Ability/Level of Safety  Cognitive Impairment Observed: No cognitive impairment observed    Home Safety Risk Factors: None    Patient Care Team:  Mikayla Forrester MD as PCP - General    HPI  Patient Active Problem List   Diagnosis    Bladder incontinence    Chronic pain of right knee    Female bladder prolapse    Effusion of right knee    Iliotibial band syndrome of right side    Lipoma of back    Other specified hypothyroidism    Primary osteoarthritis of right knee    Status post total right knee replacement    History of breast cancer      Past Surgical History:   Procedure Laterality Date    BREAST LUMPECTOMY Right 2017    + CA  had rad, no chemo    BUNIONECTOMY  2005    COLONOSCOPY  09/2014    declines further    THYROIDECTOMY  2000    no CA    TOTAL KNEE ARTHROPLASTY  2016    TUBAL LIGATION  1977       Review of Systems  This patient has   NO history of recent Covid nor flu symptoms,  NO Fever nor chills,  NO Chest pain, shortness of breath nor paroxysmal nocturnal dyspnea,  NO Nausea, vomiting, nor diarrhea,  NO Hematochezia nor melena,  NO Dysuria, hematuria, nor new incontinence issues  NO new  "severe headaches nor neurological complaints,  NO new issues with anxiety nor depression nor new psychiatric complaints,  NO suicidal nor homicidal ideations.     OBJECTIVE:  /56   Pulse 60   Temp 36.3 °C (97.3 °F) (Temporal)   Resp 16   Ht 1.651 m (5' 5\")   LMP  (LMP Unknown)   SpO2 97%   BMI 27.46 kg/m²      General:  alert, oriented, no acute distress.  No obvious skin rashes noted.   No gait disturbance noted.    Mood is pleasant, not tearful, no signs of emotional distress.  Not appearing intoxicated or altered.   No voiced delusions,   Normal, appropriate behavior.    HEENT: Normocephalic, atraumatic,   Pupils round, reactive to light  Extraocular motions intact and wnl  Tympanic membranes normal    Neck: no nuchal rigidity  No masses palpable.  No carotid bruits.  No thyromegaly.    Respiratory: Equal breath sounds  No wheezes,    rales,    nor rhonchi  No respiratory distress.    Heart: Regular rate and rhythm, no    murmurs  no rubs/gallops    Abdomen: no masses palpable, no rebound nor guarding, no rebound nor guarding.    Extremities: NO cyanosis noted, no clubbing.   No edema noted.  2+dorsalis pedis pulses.    Normal-not antalgic, steady gait.    No visits with results within 3 Month(s) from this visit.   Latest known visit with results is:   Legacy Encounter on 07/06/2022   Component Date Value Ref Range Status    Free T4 07/06/2022 1.15 (H)  0.61 - 1.12 ng/dL Final    Comment:  Thyroxine Free testing is performed using different testing    methodology at [facility] than at other    [facility] hospitals. Direct result comparisons should    only be made within the same method.  .   Biotin can cause falsely elevated free T4 results. Patients   taking a Biotin dose of up to 10 mg/day should refrain from   taking Biotin for 24 hours before sample collection. Patient   taking a Biotin dose of >10 mg/day should consult with their   physician or the laboratory before the blood draw.   "    Glucose 07/06/2022 92  74 - 99 mg/dL Final    Sodium 07/06/2022 141  136 - 145 mmol/L Final    Potassium 07/06/2022 4.6  3.5 - 5.3 mmol/L Final    Chloride 07/06/2022 104  98 - 107 mmol/L Final    Bicarbonate 07/06/2022 30  21 - 32 mmol/L Final    Anion Gap 07/06/2022 12  10 - 20 mmol/L Final    Urea Nitrogen 07/06/2022 14  6 - 23 mg/dL Final    Creatinine 07/06/2022 0.85  0.50 - 1.05 mg/dL Final    GFR Female 07/06/2022 73  >90 mL/min/1.73m2 Final    Comment:  CALCULATIONS OF ESTIMATED GFR ARE PERFORMED   USING THE 2021 CKD-EPI STUDY REFIT EQUATION   WITHOUT THE RACE VARIABLE FOR THE IDMS-TRACEABLE   CREATININE METHODS.    https://jasn.asnjournals.org/content/early/2021/09/22/ASN.2115757838      Calcium 07/06/2022 9.4  8.6 - 10.3 mg/dL Final    Albumin 07/06/2022 4.0  3.4 - 5.0 g/dL Final    Alkaline Phosphatase 07/06/2022 119  33 - 136 U/L Final    Total Protein 07/06/2022 7.3  6.4 - 8.2 g/dL Final    AST 07/06/2022 18  9 - 39 U/L Final    Total Bilirubin 07/06/2022 0.4  0.0 - 1.2 mg/dL Final    ALT (SGPT) 07/06/2022 12  7 - 45 U/L Final    Comment:  Patients treated with Sulfasalazine may generate    falsely decreased results for ALT.      TSH 07/06/2022 2.75  0.44 - 3.98 mIU/L Final    Comment:  TSH testing is performed using different testing    methodology at [facility] than at other    [facility] hospitals. Direct result comparisons should    only be made within the same method.      WBC 07/06/2022 4.7  4.4 - 11.3 x10E9/L Final    RBC 07/06/2022 4.14  4.00 - 5.20 x10E12/L Final    Hemoglobin 07/06/2022 12.3  12.0 - 16.0 g/dL Final    Hematocrit 07/06/2022 39.1  36.0 - 46.0 % Final    MCV 07/06/2022 94  80 - 100 fL Final    MCHC 07/06/2022 31.5 (L)  32.0 - 36.0 g/dL Final    Platelets 07/06/2022 292  150 - 450 x10E9/L Final    RDW 07/06/2022 13.5  11.5 - 14.5 % Final    Cholesterol 07/06/2022 151  0 - 199 mg/dL Final    Comment: .      AGE      DESIRABLE   BORDERLINE HIGH   HIGH     0-19 Y     0 -  169       170 - 199     >/= 200    20-24 Y     0 - 189       190 - 224     >/= 225         >24 Y     0 - 199       200 - 239     >/= 240   **All ranges are based on fasting samples. Specific   therapeutic targets will vary based on patient-specific   cardiac risk.  .   Pediatric guidelines reference:Pediatrics 2011, 128(S5).   Adult guidelines reference: NCEP ATPIII Guidelines,     ERENDIRA 2001, 258:2486-97  .   Venipuncture immediately after or during the    administration of Metamizole may lead to falsely   low results. Testing should be performed immediately   prior to Metamizole dosing.      HDL 07/06/2022 67.0  mg/dL Final    Comment: .      AGE      VERY LOW   LOW     NORMAL    HIGH       0-19 Y       < 35   < 40     40-45     ----    20-24 Y       ----   < 40       >45     ----      >24 Y       ----   < 40     40-60      >60  .      Cholesterol/HDL Ratio 07/06/2022 2.3   Final    Comment: REF VALUES  DESIRABLE  < 3.4  HIGH RISK  > 5.0      LDL 07/06/2022 67  0 - 99 mg/dL Final    Comment: .                           NEAR      BORD      AGE      DESIRABLE  OPTIMAL    HIGH     HIGH     VERY HIGH     0-19 Y     0 - 109     ---    110-129   >/= 130     ----    20-24 Y     0 - 119     ---    120-159   >/= 160     ----      >24 Y     0 -  99   100-129  130-159   160-189     >/=190  .      VLDL 07/06/2022 17  0 - 40 mg/dL Final    Triglycerides 07/06/2022 85  0 - 149 mg/dL Final    Comment: .      AGE      DESIRABLE   BORDERLINE HIGH   HIGH     VERY HIGH   0 D-90 D    19 - 174         ----         ----        ----  91 D- 9 Y     0 -  74        75 -  99     >/= 100      ----    10-19 Y     0 -  89        90 - 129     >/= 130      ----    20-24 Y     0 - 114       115 - 149     >/= 150      ----         >24 Y     0 - 149       150 - 199    200- 499    >/= 500  .   Venipuncture immediately after or during the    administration of Metamizole may lead to falsely   low results. Testing should be performed immediately   prior  to Metamizole dosing.          Assessment/Plan     Problem List Items Addressed This Visit          Musculoskeletal    Primary osteoarthritis of right knee       Endocrine/Metabolic    Other specified hypothyroidism    Relevant Orders    CBC and Auto Differential    Free T4 Index    Thyroid Stimulating Hormone       Other    History of breast cancer     Other Visit Diagnoses       Encounter for Medicare annual wellness exam    -  Primary    Relevant Orders    CBC and Auto Differential    Comprehensive Metabolic Panel    Lipid Panel    Hemoglobin A1C    Routine general medical examination at health care facility              Advance Care Planning Note   Discussion Date: 04/01/23   Discussion Participants: patient    The patient wishes to discuss Advance Care Planning today and the following is a brief summary of our discussion.     Patient has capacity to make their own medical decisions: Yes  Health Care Agent/Surrogate Decision Maker documented in chart: Yes  Documents on file and valid:  Advance Directive/Living Will: Yes   Health Care Power of : Yes  Communication of Medical Status/Prognosis:   yes   Communication of Treatment Goals/Options:   yes  Treatment Decisions  yes  Time Statement: Total face to face time spent on advance care planning was <30 minutes with <30 minutes spent in counseling, including the explanation.  Some mild depression at times  No hi/si  Consider counseling-will call if wants counseling      Full no code desired  No depression  Labs soon  The patient is aware that results will be forthcoming of ALL planned labs and or tests. If no results are received on my chart or by letter within 1 - 3 weeks, the patient is aware they need to call to obtain results as this is not usual.

## 2023-04-01 NOTE — PROGRESS NOTES
Covid vax: x 3  CRC: 9/2014-declines further  Mammogram: 2020-declines further  Pap: n/a  Lmp: n/a

## 2023-06-05 DIAGNOSIS — E03.8 OTHER SPECIFIED HYPOTHYROIDISM: ICD-10-CM

## 2023-06-05 RX ORDER — LEVOTHYROXINE SODIUM 75 UG/1
TABLET ORAL
Qty: 90 TABLET | Refills: 1 | Status: SHIPPED | OUTPATIENT
Start: 2023-06-05 | End: 2023-11-27 | Stop reason: SDUPTHER

## 2023-06-21 ENCOUNTER — TELEPHONE (OUTPATIENT)
Dept: PRIMARY CARE | Facility: CLINIC | Age: 73
End: 2023-06-21
Payer: MEDICARE

## 2023-06-21 NOTE — TELEPHONE ENCOUNTER
Called patient and lmom to let them know they are eligible for AWV and to schedule apt if interested.     Please forward message to Ekaterina if patient scheduled or declined.

## 2023-06-22 ENCOUNTER — TELEPHONE (OUTPATIENT)
Dept: PRIMARY CARE | Facility: CLINIC | Age: 73
End: 2023-06-22
Payer: MEDICARE

## 2023-06-22 NOTE — TELEPHONE ENCOUNTER
Please review date of service date 04/01/2023.  Summary shows patient had annual wellness visit but there is not a billing code for annual wellness visit. Thank you

## 2023-10-31 ENCOUNTER — APPOINTMENT (OUTPATIENT)
Dept: ORTHOPEDIC SURGERY | Facility: CLINIC | Age: 73
End: 2023-10-31
Payer: MEDICARE

## 2023-11-27 DIAGNOSIS — E03.8 OTHER SPECIFIED HYPOTHYROIDISM: ICD-10-CM

## 2023-11-27 RX ORDER — LEVOTHYROXINE SODIUM 75 UG/1
75 TABLET ORAL DAILY
Qty: 90 TABLET | Refills: 1 | Status: SHIPPED | OUTPATIENT
Start: 2023-11-27 | End: 2024-05-16 | Stop reason: SDUPTHER

## 2024-03-04 DIAGNOSIS — Z96.651 S/P TOTAL KNEE ARTHROPLASTY, RIGHT: Primary | ICD-10-CM

## 2024-03-05 ENCOUNTER — OFFICE VISIT (OUTPATIENT)
Dept: ORTHOPEDIC SURGERY | Facility: CLINIC | Age: 74
End: 2024-03-05
Payer: MEDICARE

## 2024-03-05 ENCOUNTER — CLINICAL SUPPORT (OUTPATIENT)
Dept: ORTHOPEDIC SURGERY | Facility: CLINIC | Age: 74
End: 2024-03-05
Payer: MEDICARE

## 2024-03-05 DIAGNOSIS — Z96.653 S/P TOTAL KNEE ARTHROPLASTY, BILATERAL: Primary | ICD-10-CM

## 2024-03-05 DIAGNOSIS — Z96.651 S/P TOTAL KNEE ARTHROPLASTY, RIGHT: ICD-10-CM

## 2024-03-05 PROCEDURE — 1036F TOBACCO NON-USER: CPT | Performed by: ORTHOPAEDIC SURGERY

## 2024-03-05 PROCEDURE — 73562 X-RAY EXAM OF KNEE 3: CPT | Mod: BILATERAL PROCEDURE | Performed by: ORTHOPAEDIC SURGERY

## 2024-03-05 PROCEDURE — 99213 OFFICE O/P EST LOW 20 MIN: CPT | Performed by: ORTHOPAEDIC SURGERY

## 2024-03-05 NOTE — PROGRESS NOTES
Chief Complaint   Patient presents with    Left Knee - Follow-up    Right Knee - Follow-up       The patient is here for follow-up of their side: bilateral knee arthroplasty.  The patient has no knee pain.  The patient has no mechanical symptoms.  The patient has no swelling.  The patient is approximately 1 year(s) postop    Physical examination:    Examination of the side: bilateral knee  The incision is healing well  No erythema or warmth.  No instability varus or valgus stressing the knee at 0, 30 or 60 degrees.  No instability in the AP plane at 90 degrees.  Range of motion: 0 degrees extension, 120 degrees flexion  There is no tenderness  Calf is soft, Homans negative  The patient has intact ankle dorsiflexion and plantarflexion.    Radiographs:   XR knee 3 views bilateral  Interpreted By:  Fer Mckeon,   STUDY:  XR KNEE 3 VIEWS BILATERAL; ; 3/5/2024 9:43 am      INDICATION:  Signs/Symptoms:pain.      ACCESSION NUMBER(S):  [id number]      ORDERING CLINICIAN:  FER MCKEON      FINDINGS:  Bilateral knee films show total knee arthroplasties in satisfactory  position. The patella are well positioned. No fracture is identified.  No obvious loosening or wear is appreciated.          Signed by: Fer Mckeon 3/5/2024 9:53 AM  Dictation workstation:   [id number]        Impression:  Status post side: bilateral total knee arthroplasty    Plan:  Discussed the importance of prophylactic dental antibiotics  Follow up in 1 year with x-rays  All questions answered

## 2024-05-10 ENCOUNTER — OFFICE VISIT (OUTPATIENT)
Dept: ORTHOPEDIC SURGERY | Facility: CLINIC | Age: 74
End: 2024-05-10
Payer: MEDICARE

## 2024-05-10 DIAGNOSIS — M65.30 TRIGGER FINGER OF RIGHT HAND, UNSPECIFIED FINGER: Primary | ICD-10-CM

## 2024-05-10 PROCEDURE — 99214 OFFICE O/P EST MOD 30 MIN: CPT | Performed by: STUDENT IN AN ORGANIZED HEALTH CARE EDUCATION/TRAINING PROGRAM

## 2024-05-10 PROCEDURE — 1036F TOBACCO NON-USER: CPT | Performed by: STUDENT IN AN ORGANIZED HEALTH CARE EDUCATION/TRAINING PROGRAM

## 2024-05-10 PROCEDURE — 1159F MED LIST DOCD IN RCRD: CPT | Performed by: STUDENT IN AN ORGANIZED HEALTH CARE EDUCATION/TRAINING PROGRAM

## 2024-05-10 PROCEDURE — 20550 NJX 1 TENDON SHEATH/LIGAMENT: CPT | Mod: RT | Performed by: STUDENT IN AN ORGANIZED HEALTH CARE EDUCATION/TRAINING PROGRAM

## 2024-05-10 PROCEDURE — 2500000004 HC RX 250 GENERAL PHARMACY W/ HCPCS (ALT 636 FOR OP/ED): Performed by: STUDENT IN AN ORGANIZED HEALTH CARE EDUCATION/TRAINING PROGRAM

## 2024-05-10 PROCEDURE — 2500000005 HC RX 250 GENERAL PHARMACY W/O HCPCS: Performed by: STUDENT IN AN ORGANIZED HEALTH CARE EDUCATION/TRAINING PROGRAM

## 2024-05-10 RX ORDER — IBUPROFEN 600 MG/1
600 TABLET ORAL 3 TIMES DAILY
COMMUNITY
Start: 2021-04-11

## 2024-05-10 RX ORDER — LIDOCAINE HYDROCHLORIDE 10 MG/ML
0.5 INJECTION INFILTRATION; PERINEURAL
Status: COMPLETED | OUTPATIENT
Start: 2024-05-10 | End: 2024-05-10

## 2024-05-10 RX ADMIN — LIDOCAINE HYDROCHLORIDE 0.5 ML: 10 INJECTION, SOLUTION INFILTRATION; PERINEURAL at 08:25

## 2024-05-10 RX ADMIN — TRIAMCINOLONE ACETONIDE 5 MG: 10 INJECTION, SUSPENSION INTRA-ARTICULAR; INTRALESIONAL at 08:25

## 2024-05-10 NOTE — PROGRESS NOTES
History of Present Illness:  Presents for evaluation of right ring finger.  The symptoms have been present for months. The patient denies any inciting trauma. The pain is localized to the A1 pulley of the affected finger. It is described as moderate. The pain/locking occurs intermittantly and is more severe overnight and in the morning.         Review of Systems   GENERAL: Negative for malaise, significant weight loss, fever  MUSCULOSKELETAL: see HPI  NEURO:  Negative    The patient's past medical history, family history, social history, and review of systems were reviewed. History is otherwise negative except as stated in the HPI.    Physical Examination:  General: Alert and oriented to person, place, and time.  No acute distress and breathing comfortably: Pleasant and cooperative with examination.  HEENT: Head is normocephalic and atraumatic.  Neck: Supple, no visible swelling.  Cardiovascular: No palpable tachycardia  Lungs: No audible wheezing or labored breathing  Abdomen: Nondistended.  On musculoskeletal examination, the elbow and wrist have full, symmetric range of motion without obvious tenderness to palpation. Strength is full. Sensation and motor function are intact in the radial, ulnar, and median nerve distribution. There is no thenar or intrinsic atrophy with appropriate strength. There is obvious triggering of the right ring with tenderness over the corresponding A1 pulley. The adjacent fingers are unaffected. There is intact flexor and extensor tendon function. The patient can make a full composite fist but has pain while doing so. The hand itself is warm and well perfused. The skin is intact throughout. The contralateral hand and wrist are normal to inspection, range of motion, stability, and strength.    Imaging:  NA    Hand / UE Inj/Asp: R ring A1 for trigger finger on 5/10/2024 8:25 AM  Indications: pain  Details: 24 G needle, volar approach  Medications: 5 mg triamcinolone acetonide 10 mg/mL;  0.5 mL lidocaine 10 mg/mL (1 %)  Procedure, treatment alternatives, risks and benefits explained, specific risks discussed. Consent was given by the patient. Immediately prior to procedure a time out was called to verify the correct patient, procedure, equipment, support staff and site/side marked as required.             Assessment:  Patient with a right ring trigger    Plan:  Injection. I had a long discussion with the patient regarding the diagnosis of trigger finger and the risks/benefits/expected outcomes of various treatment options.  At this point, the patient elected non-operative treatment consisting of a corticosteroid injection. I reviewed the specific risks of injection, which include, but are not limited to, infection, bleeding, pain, steroid flare, glycemic alteration, subcutaneous fat atrophy, skin hypopigmentation, soft tissue damage, and incomplete symptom relief. The patient consented to the injection. Then, using sterile technique, I injected 1mL of Kenalog 40 into the area of the flexor sheath at the level of the A1 pulley. The injection site was dressed, and the patient tolerated the injection well. Finally, I emphasized patience, as any benefit may take some time to manifest. Depending on the success of the injection, I will see them back on an as needed basis.        Follow up: 3 months      Cassia Rendon MD  Orthopaedic Surgeon

## 2024-05-16 DIAGNOSIS — E03.8 OTHER SPECIFIED HYPOTHYROIDISM: ICD-10-CM

## 2024-05-16 RX ORDER — LEVOTHYROXINE SODIUM 75 UG/1
75 TABLET ORAL DAILY
Qty: 90 TABLET | Refills: 1 | Status: SHIPPED | OUTPATIENT
Start: 2024-05-16

## 2024-07-16 ENCOUNTER — APPOINTMENT (OUTPATIENT)
Dept: PRIMARY CARE | Facility: CLINIC | Age: 74
End: 2024-07-16
Payer: MEDICARE

## 2024-07-18 ENCOUNTER — TELEPHONE (OUTPATIENT)
Dept: PRIMARY CARE | Facility: CLINIC | Age: 74
End: 2024-07-18
Payer: MEDICARE

## 2024-07-18 DIAGNOSIS — E03.8 OTHER SPECIFIED HYPOTHYROIDISM: ICD-10-CM

## 2024-07-18 DIAGNOSIS — Z00.00 ENCOUNTER FOR MEDICARE ANNUAL WELLNESS EXAM: ICD-10-CM

## 2024-07-18 DIAGNOSIS — Z00.00 ROUTINE GENERAL MEDICAL EXAMINATION AT HEALTH CARE FACILITY: ICD-10-CM

## 2024-07-18 NOTE — TELEPHONE ENCOUNTER
Patient has upcoming apt on 08/02/24. Patient requesting to have labs done before apt.  If you approve please place orders and notify patient.

## 2024-07-24 ENCOUNTER — LAB (OUTPATIENT)
Dept: LAB | Facility: LAB | Age: 74
End: 2024-07-24
Payer: MEDICARE

## 2024-07-24 DIAGNOSIS — E03.8 OTHER SPECIFIED HYPOTHYROIDISM: ICD-10-CM

## 2024-07-24 DIAGNOSIS — Z00.00 ROUTINE GENERAL MEDICAL EXAMINATION AT HEALTH CARE FACILITY: ICD-10-CM

## 2024-07-24 DIAGNOSIS — Z00.00 ENCOUNTER FOR MEDICARE ANNUAL WELLNESS EXAM: ICD-10-CM

## 2024-07-24 LAB
ALBUMIN SERPL BCP-MCNC: 4.1 G/DL (ref 3.4–5)
ALP SERPL-CCNC: 100 U/L (ref 33–136)
ALT SERPL W P-5'-P-CCNC: 13 U/L (ref 7–45)
ANION GAP SERPL CALC-SCNC: 8 MMOL/L (ref 10–20)
AST SERPL W P-5'-P-CCNC: 21 U/L (ref 9–39)
BASOPHILS # BLD AUTO: 0.07 X10*3/UL (ref 0–0.1)
BASOPHILS NFR BLD AUTO: 1.4 %
BILIRUB SERPL-MCNC: 0.4 MG/DL (ref 0–1.2)
BUN SERPL-MCNC: 19 MG/DL (ref 6–23)
CALCIUM SERPL-MCNC: 9.3 MG/DL (ref 8.6–10.3)
CHLORIDE SERPL-SCNC: 106 MMOL/L (ref 98–107)
CHOLEST SERPL-MCNC: 185 MG/DL (ref 0–199)
CHOLESTEROL/HDL RATIO: 2.7
CO2 SERPL-SCNC: 30 MMOL/L (ref 21–32)
CREAT SERPL-MCNC: 0.91 MG/DL (ref 0.5–1.05)
EGFRCR SERPLBLD CKD-EPI 2021: 67 ML/MIN/1.73M*2
EOSINOPHIL # BLD AUTO: 0.57 X10*3/UL (ref 0–0.4)
EOSINOPHIL NFR BLD AUTO: 11.3 %
ERYTHROCYTE [DISTWIDTH] IN BLOOD BY AUTOMATED COUNT: 14 % (ref 11.5–14.5)
GLUCOSE SERPL-MCNC: 89 MG/DL (ref 74–99)
HCT VFR BLD AUTO: 37.6 % (ref 36–46)
HDLC SERPL-MCNC: 69 MG/DL
HGB BLD-MCNC: 12.3 G/DL (ref 12–16)
IMM GRANULOCYTES # BLD AUTO: 0.02 X10*3/UL (ref 0–0.5)
IMM GRANULOCYTES NFR BLD AUTO: 0.4 % (ref 0–0.9)
LDLC SERPL CALC-MCNC: 99 MG/DL
LYMPHOCYTES # BLD AUTO: 1.75 X10*3/UL (ref 0.8–3)
LYMPHOCYTES NFR BLD AUTO: 34.6 %
MCH RBC QN AUTO: 30.7 PG (ref 26–34)
MCHC RBC AUTO-ENTMCNC: 32.7 G/DL (ref 32–36)
MCV RBC AUTO: 94 FL (ref 80–100)
MONOCYTES # BLD AUTO: 0.83 X10*3/UL (ref 0.05–0.8)
MONOCYTES NFR BLD AUTO: 16.4 %
NEUTROPHILS # BLD AUTO: 1.82 X10*3/UL (ref 1.6–5.5)
NEUTROPHILS NFR BLD AUTO: 35.9 %
NON HDL CHOLESTEROL: 116 MG/DL (ref 0–149)
NRBC BLD-RTO: 0 /100 WBCS (ref 0–0)
PLATELET # BLD AUTO: 273 X10*3/UL (ref 150–450)
POTASSIUM SERPL-SCNC: 4.3 MMOL/L (ref 3.5–5.3)
PROT SERPL-MCNC: 6.8 G/DL (ref 6.4–8.2)
RBC # BLD AUTO: 4.01 X10*6/UL (ref 4–5.2)
SODIUM SERPL-SCNC: 140 MMOL/L (ref 136–145)
T4 FREE SERPL-MCNC: 0.95 NG/DL (ref 0.61–1.12)
TRIGL SERPL-MCNC: 83 MG/DL (ref 0–149)
TSH SERPL-ACNC: 1.06 MIU/L (ref 0.44–3.98)
VLDL: 17 MG/DL (ref 0–40)
WBC # BLD AUTO: 5.1 X10*3/UL (ref 4.4–11.3)

## 2024-07-24 PROCEDURE — 36415 COLL VENOUS BLD VENIPUNCTURE: CPT

## 2024-08-02 ENCOUNTER — APPOINTMENT (OUTPATIENT)
Dept: PRIMARY CARE | Facility: CLINIC | Age: 74
End: 2024-08-02
Payer: MEDICARE

## 2024-08-02 VITALS
RESPIRATION RATE: 16 BRPM | DIASTOLIC BLOOD PRESSURE: 60 MMHG | BODY MASS INDEX: 27.66 KG/M2 | WEIGHT: 166 LBS | TEMPERATURE: 97.1 F | SYSTOLIC BLOOD PRESSURE: 118 MMHG | HEIGHT: 65 IN | HEART RATE: 72 BPM | OXYGEN SATURATION: 97 %

## 2024-08-02 DIAGNOSIS — Z00.00 ROUTINE GENERAL MEDICAL EXAMINATION AT HEALTH CARE FACILITY: Primary | ICD-10-CM

## 2024-08-02 DIAGNOSIS — Z00.00 ENCOUNTER FOR MEDICARE ANNUAL WELLNESS EXAM: ICD-10-CM

## 2024-08-02 DIAGNOSIS — Z85.3 HISTORY OF BREAST CANCER: ICD-10-CM

## 2024-08-02 DIAGNOSIS — E03.8 OTHER SPECIFIED HYPOTHYROIDISM: ICD-10-CM

## 2024-08-02 DIAGNOSIS — M25.561 CHRONIC PAIN OF RIGHT KNEE: ICD-10-CM

## 2024-08-02 DIAGNOSIS — G89.29 CHRONIC PAIN OF RIGHT KNEE: ICD-10-CM

## 2024-08-02 ASSESSMENT — ACTIVITIES OF DAILY LIVING (ADL)
BATHING: INDEPENDENT
DOING_HOUSEWORK: INDEPENDENT
GROCERY_SHOPPING: INDEPENDENT
TAKING_MEDICATION: INDEPENDENT
MANAGING_FINANCES: INDEPENDENT
DRESSING: INDEPENDENT

## 2024-08-02 ASSESSMENT — PATIENT HEALTH QUESTIONNAIRE - PHQ9
1. LITTLE INTEREST OR PLEASURE IN DOING THINGS: NOT AT ALL
2. FEELING DOWN, DEPRESSED OR HOPELESS: NOT AT ALL
SUM OF ALL RESPONSES TO PHQ9 QUESTIONS 1 AND 2: 0

## 2024-08-02 ASSESSMENT — ENCOUNTER SYMPTOMS
LOSS OF SENSATION IN FEET: 0
DEPRESSION: 0
OCCASIONAL FEELINGS OF UNSTEADINESS: 0

## 2024-08-02 NOTE — PROGRESS NOTES
Covid vax: x 3  Flu: n/a  Pneumo: UTD  RSV: advised  Shingles: advised    CRC: declined  Mammogram: declined  Pap: n/a  Lmp: n/a

## 2024-08-02 NOTE — PROGRESS NOTES
Subjective   Reason for Visit: Rasheeda Hawley is a 73 y.o. female here for a Medicare Wellness visit.   Covid vax: x 3  Flu: n/a  Pneumo: UTD  RSV: advised  Shingles: advised     CRC: declined  Mammogram: declined  Pap: n/a  Lmp: n/a  CHECKLIST REVIEWED AND COMPLETE FOR [facility]  Medicare Annual Wellness Visit Subsequent  ---------------------------------------------------------------------------------------------  Past Medical, Surgical, and Family History reviewed and updated in chart.    Reviewed all medications by prescribing practitioner or clinical pharmacist (such as prescriptions, OTCs, herbal therapies and supplements) and documented in the medical record.    HPI    Patient Self Assessment of Health Status  Patient Self Assessment: Good    Nutrition and Exercise:    Current Diet: HEALTHY Diet always best, minimizing excess carbs,   weight reduction advised if BMI not WNL, please maintain a NORMAL BMI 18.5-24.9    Adequate Fluid Intake: Yes  Caffeine: -aware to minimize intake, <300mg best to even take in LESS  Exercise Frequency: Regularly advised, weight bearing, strengthening, aerobic    Functional Ability/Level of Safety:  Home safety addressed: no active new concerns,   fall risk addressed  NO new hearing issues or concerns  [place] in ADLs addressed and areas of assistance if present -noted    ANY Cognitive Impairment Observed: No cognitive impairment observed    Home Safety Risk Factors: None  --------------------------------------------------------------------------------------------  Patient Care Team:  Mikayla Forrester MD as PCP - General    HPI  Patient Active Problem List   Diagnosis    Bladder incontinence    Chronic pain of right knee    Female bladder prolapse    Effusion of right knee    Iliotibial band syndrome of right side    Lipoma of back    Other specified hypothyroidism    Primary osteoarthritis of right knee    Status post total right knee replacement    History of breast cancer  "     Past Surgical History:   Procedure Laterality Date    BREAST LUMPECTOMY Right 2017    + CA  had rad, no chemo    BUNIONECTOMY  2005    COLONOSCOPY  09/2014    declines further    LIPOMA RESECTION  2021    THYROIDECTOMY  2000    no CA    TOTAL KNEE ARTHROPLASTY  2016    TOTAL KNEE ARTHROPLASTY  2008 2008 L //R 2022    TUBAL LIGATION  1977         PHQ2(-) No active depressed mood or not in crisis, 5min. spent in discussion.    Review of Systems:    NO Seizures  NO CAD  NO CVA    This patient has   NO history of recent Covid nor flu symptoms,  NO Fever nor chills,  NO Chest pain, shortness of breath nor paroxysmal nocturnal dyspnea,  NO Nausea, vomiting, nor diarrhea,  NO Hematochezia nor melena,  NO Dysuria, hematuria, nor new incontinence issues  NO new severe headaches nor neurological complaints,  NO new issues with anxiety nor depression nor new psychiatric complaints,  NO suicidal nor homicidal ideations.  ---------------------------------------------------------------------------------------------   OBJECTIVE:  /60   Pulse 72   Temp 36.2 °C (97.1 °F) (Temporal)   Resp 16   Ht 1.651 m (5' 5\")   Wt 75.3 kg (166 lb)   LMP  (LMP Unknown)   SpO2 97%   BMI 27.62 kg/m²      General:  alert, oriented, no acute distress.  No obvious skin rashes noted.   No gait disturbance noted.    Mood is pleasant, not tearful, no signs of emotional distress.  Not appearing intoxicated or altered.   No voiced delusions,   Normal, appropriate behavior.    HEENT: Normocephalic, atraumatic,   Pupils round, reactive to light  Extraocular motions intact and wnl  Tympanic membranes normal    Neck: no nuchal rigidity  No masses palpable.  No carotid bruits.  No thyromegaly.    Respiratory: Equal breath sounds  No wheezes,    rales,    nor rhonchi  No respiratory distress.    Heart: Regular rate and rhythm, no    murmurs  no rubs/gallops    Abdomen: no masses palpable, no rebound nor guarding, no rebound nor " guarding.    Extremities: NO cyanosis noted, no clubbing.   No edema noted.  2+dorsalis pedis pulses.    Normal-not antalgic, steady gait.    Lab on 07/24/2024   Component Date Value Ref Range Status    WBC 07/24/2024 5.1  4.4 - 11.3 x10*3/uL Final    nRBC 07/24/2024 0.0  0.0 - 0.0 /100 WBCs Final    RBC 07/24/2024 4.01  4.00 - 5.20 x10*6/uL Final    Hemoglobin 07/24/2024 12.3  12.0 - 16.0 g/dL Final    Hematocrit 07/24/2024 37.6  36.0 - 46.0 % Final    MCV 07/24/2024 94  80 - 100 fL Final    MCH 07/24/2024 30.7  26.0 - 34.0 pg Final    MCHC 07/24/2024 32.7  32.0 - 36.0 g/dL Final    RDW 07/24/2024 14.0  11.5 - 14.5 % Final    Platelets 07/24/2024 273  150 - 450 x10*3/uL Final    Neutrophils % 07/24/2024 35.9  40.0 - 80.0 % Final    Immature Granulocytes %, Automated 07/24/2024 0.4  0.0 - 0.9 % Final    Immature Granulocyte Count (IG) includes promyelocytes, myelocytes and metamyelocytes but does not include bands. Percent differential counts (%) should be interpreted in the context of the absolute cell counts (cells/UL).    Lymphocytes % 07/24/2024 34.6  13.0 - 44.0 % Final    Monocytes % 07/24/2024 16.4  2.0 - 10.0 % Final    Eosinophils % 07/24/2024 11.3  0.0 - 6.0 % Final    Basophils % 07/24/2024 1.4  0.0 - 2.0 % Final    Neutrophils Absolute 07/24/2024 1.82  1.60 - 5.50 x10*3/uL Final    Percent differential counts (%) should be interpreted in the context of the absolute cell counts (cells/uL).    Immature Granulocytes Absolute, Au* 07/24/2024 0.02  0.00 - 0.50 x10*3/uL Final    Lymphocytes Absolute 07/24/2024 1.75  0.80 - 3.00 x10*3/uL Final    Monocytes Absolute 07/24/2024 0.83 (H)  0.05 - 0.80 x10*3/uL Final    Eosinophils Absolute 07/24/2024 0.57 (H)  0.00 - 0.40 x10*3/uL Final    Basophils Absolute 07/24/2024 0.07  0.00 - 0.10 x10*3/uL Final    Glucose 07/24/2024 89  74 - 99 mg/dL Final    Sodium 07/24/2024 140  136 - 145 mmol/L Final    Potassium 07/24/2024 4.3  3.5 - 5.3 mmol/L Final    Chloride  07/24/2024 106  98 - 107 mmol/L Final    Bicarbonate 07/24/2024 30  21 - 32 mmol/L Final    Anion Gap 07/24/2024 8 (L)  10 - 20 mmol/L Final    Urea Nitrogen 07/24/2024 19  6 - 23 mg/dL Final    Creatinine 07/24/2024 0.91  0.50 - 1.05 mg/dL Final    eGFR 07/24/2024 67  >60 mL/min/1.73m*2 Final    Calculations of estimated GFR are performed using the 2021 CKD-EPI Study Refit equation without the race variable for the IDMS-Traceable creatinine methods.  https://jasn.asnjournals.org/content/early/2021/09/22/ASN.8489386747    Calcium 07/24/2024 9.3  8.6 - 10.3 mg/dL Final    Albumin 07/24/2024 4.1  3.4 - 5.0 g/dL Final    Alkaline Phosphatase 07/24/2024 100  33 - 136 U/L Final    Total Protein 07/24/2024 6.8  6.4 - 8.2 g/dL Final    AST 07/24/2024 21  9 - 39 U/L Final    Bilirubin, Total 07/24/2024 0.4  0.0 - 1.2 mg/dL Final    ALT 07/24/2024 13  7 - 45 U/L Final    Patients treated with Sulfasalazine may generate falsely decreased results for ALT.    Cholesterol 07/24/2024 185  0 - 199 mg/dL Final          Age      Desirable   Borderline High   High     0-19 Y     0 - 169       170 - 199     >/= 200    20-24 Y     0 - 189       190 - 224     >/= 225         >24 Y     0 - 199       200 - 239     >/= 240   **All ranges are based on fasting samples. Specific   therapeutic targets will vary based on patient-specific   cardiac risk.    Pediatric guidelines reference:Pediatrics 2011, 128(S5).Adult guidelines reference: NCEP ATPIII Guidelines,ERENDIRA 2001, 258:2486-97    Venipuncture immediately after or during the administration of Metamizole may lead to falsely low results. Testing should be performed immediately prior to Metamizole dosing.    HDL-Cholesterol 07/24/2024 69.0  mg/dL Final      Age       Very Low   Low     Normal    High    0-19 Y    < 35      < 40     40-45     ----  20-24 Y    ----     < 40      >45      ----        >24 Y      ----     < 40     40-60      >60      Cholesterol/HDL Ratio 07/24/2024 2.7   Final       Ref Values  Desirable  < 3.4  High Risk  > 5.0    LDL Calculated 07/24/2024 99  <=99 mg/dL Final                                Near   Borderline      AGE      Desirable  Optimal    High     High     Very High     0-19 Y     0 - 109     ---    110-129   >/= 130     ----    20-24 Y     0 - 119     ---    120-159   >/= 160     ----      >24 Y     0 -  99   100-129  130-159   160-189     >/=190      VLDL 07/24/2024 17  0 - 40 mg/dL Final    Triglycerides 07/24/2024 83  0 - 149 mg/dL Final       Age         Desirable   Borderline High   High     Very High   0 D-90 D    19 - 174         ----         ----        ----  91 D- 9 Y     0 -  74        75 -  99     >/= 100      ----    10-19 Y     0 -  89        90 - 129     >/= 130      ----    20-24 Y     0 - 114       115 - 149     >/= 150      ----         >24 Y     0 - 149       150 - 199    200- 499    >/= 500    Venipuncture immediately after or during the administration of Metamizole may lead to falsely low results. Testing should be performed immediately prior to Metamizole dosing.    Non HDL Cholesterol 07/24/2024 116  0 - 149 mg/dL Final          Age       Desirable   Borderline High   High     Very High     0-19 Y     0 - 119       120 - 144     >/= 145    >/= 160    20-24 Y     0 - 149       150 - 189     >/= 190      ----         >24 Y    30 mg/dL above LDL Cholesterol goal      Thyroid Stimulating Hormone 07/24/2024 1.06  0.44 - 3.98 mIU/L Final    Thyroxine, Free 07/24/2024 0.95  0.61 - 1.12 ng/dL Final        Assessment/Plan     Problem List Items Addressed This Visit       Chronic pain of right knee    Other specified hypothyroidism    History of breast cancer     Other Visit Diagnoses       Encounter for Medicare annual wellness exam              Advance Care Planning Note   Discussion Date: 08/02/24   Discussion Participants: patient  16 min spent discussing ACP w pt    The patient wishes to discuss Advance Care Planning today and the following is a  brief summary of our discussion.     Patient has capacity to make their own medical decisions: Yes  Health Care Agent/Surrogate Decision Maker documented in chart: Yes    Documents on file and valid:  Advance Directive/Living Will: advised today (she thinks she has)    Health Care Power of : advised today  Communication of Medical Status/Prognosis:   yes   Communication of Treatment Goals/Options:   yes  Treatment Decisions/involved with patient today  yes  Time Statement: Total face to face time spent on advance care planning was <30 minutes with <30 minutes spent in counseling, including the explanation.    SEE ME AT NEXT REGULARLY SCHEDULED VISIT-sooner if condition deteriorates or new problems arise.    PATIENT WOULD LIKE TO BE A FULL NO CODE    NO uncontrolled DEPRESSION noted  Assessed and reviewed for opioid use.  NO EVIDENCE OF SIGNIFICANT DEMENTIA    Signs and symptoms of concern with depression-if in crisis -(no current HI/SI) will let us know and proceed to ER   Signs/symptoms of concern with dementia-and need to contact us if they occur discussed.    ASCVD  8.2 %   addressed and risk reduction conversation took place  Offered CARDIOLOGY and CCS-declines   All above counseling 15 minutes in conversation/documentation etc    Mood counseling 5min- no uncontrolled depression, good support system, has crisis plan if occurs.  Included BMI counseling and options if BMI>30        QUESTIONS answered    3-4mm lesion above lip likely BCC-needs removed      Next visit addressed -regular visit as scheduled and follow up sooner if condition deterioration or new problems arise.    This completes Rasheeda Hawley ANNUAL MEDICARE WELLNESS VISIT today.  If no other follow ups discussed: Please follow up in 1 year for NEXT ANNUAL MEDICARE WELLNESS VISIT.    Mikayla Forrester MD    This documentation is subject to inadvertent typing and other similar clerical/grammatic errors etc.

## 2024-08-12 ENCOUNTER — OFFICE VISIT (OUTPATIENT)
Dept: ORTHOPEDIC SURGERY | Facility: CLINIC | Age: 74
End: 2024-08-12
Payer: MEDICARE

## 2024-08-12 VITALS — WEIGHT: 160 LBS | BODY MASS INDEX: 26.66 KG/M2 | HEIGHT: 65 IN

## 2024-08-12 DIAGNOSIS — M65.30 TRIGGER FINGER OF RIGHT HAND, UNSPECIFIED FINGER: Primary | ICD-10-CM

## 2024-08-12 PROCEDURE — 99214 OFFICE O/P EST MOD 30 MIN: CPT | Mod: 57 | Performed by: STUDENT IN AN ORGANIZED HEALTH CARE EDUCATION/TRAINING PROGRAM

## 2024-08-12 PROCEDURE — 1159F MED LIST DOCD IN RCRD: CPT | Performed by: STUDENT IN AN ORGANIZED HEALTH CARE EDUCATION/TRAINING PROGRAM

## 2024-08-12 PROCEDURE — 1036F TOBACCO NON-USER: CPT | Performed by: STUDENT IN AN ORGANIZED HEALTH CARE EDUCATION/TRAINING PROGRAM

## 2024-08-12 PROCEDURE — 99214 OFFICE O/P EST MOD 30 MIN: CPT | Performed by: STUDENT IN AN ORGANIZED HEALTH CARE EDUCATION/TRAINING PROGRAM

## 2024-08-12 PROCEDURE — 3008F BODY MASS INDEX DOCD: CPT | Performed by: STUDENT IN AN ORGANIZED HEALTH CARE EDUCATION/TRAINING PROGRAM

## 2024-08-12 NOTE — PROGRESS NOTES
History of Present Illness  Patient returns today for evaluation of right ring trigger finger.     Physical Examination:  Right upper extremity:  The patient appears to be their stated age, is in no apparent distress, and is oriented x3. The patients mood and affect are appropriate. The patients gait is normal. The examination of the limb in question was performed in comparison to the contralateral limb.    On musculoskeletal examination, tenderness over the right ring A1 pulley.  Positive clicking and locking on exam    Sensation and motor function are intact in the radial, and median nerve distribution. There is no obvious thenar atrophy, and thenar strength is 5/5. There is no intrinsic atrophy, and intrinsic strength is 5/5.  The patient can make a full composite fist. The hand itself is warm and well perfused. The skin is intact throughout. The contralateral hand/wrist are normal to inspection, range of motion, stability, and strength.      Assessment:  Patient with recurrent right ring trigger finger.  Injection 5/10/2024 with 1 month of relief.  Now with recurrence.  Would like to discuss surgical release    Plan:   At this point, I discussed the risks/benefits/expected outcomes of the three treatment options: observation vs surgery. The patient has elected to proceed with surgery. The risks/benefits of surgery were reviewed. The risks include, but are not limited to, infection, bleeding, nerve/vessel injury, recurrence, stiffness, and anaesthetic complications. I have answered all questions regarding the surgery itself and the post-operative course. The patient consented to surgery and will be scheduled in the near future.      Cassia Rendon MD

## 2024-09-13 ENCOUNTER — APPOINTMENT (OUTPATIENT)
Dept: PRIMARY CARE | Facility: CLINIC | Age: 74
End: 2024-09-13
Payer: MEDICARE

## 2024-09-13 VITALS
HEIGHT: 65 IN | BODY MASS INDEX: 26.66 KG/M2 | SYSTOLIC BLOOD PRESSURE: 120 MMHG | HEART RATE: 56 BPM | WEIGHT: 160 LBS | TEMPERATURE: 98.3 F | OXYGEN SATURATION: 97 % | RESPIRATION RATE: 16 BRPM | DIASTOLIC BLOOD PRESSURE: 68 MMHG

## 2024-09-13 DIAGNOSIS — E03.8 OTHER SPECIFIED HYPOTHYROIDISM: ICD-10-CM

## 2024-09-13 DIAGNOSIS — K13.0 LIP LESION: Primary | ICD-10-CM

## 2024-09-13 NOTE — PROGRESS NOTES
"  Subjective   Patient ID: Rasheeda Hawley is a 73 y.o. female who presents for bx.  BIOPSY  Lesion above lip concerning for bcc    HPI  Patient Active Problem List   Diagnosis    Bladder incontinence    Chronic pain of right knee    Female bladder prolapse    Effusion of right knee    Iliotibial band syndrome of right side    Lipoma of back    Other specified hypothyroidism    Primary osteoarthritis of right knee    Status post total right knee replacement    History of breast cancer     Review of Systems  This patient has   NO history of recent Covid nor flu symptoms,  NO Fever nor chills,  NO Chest pain, shortness of breath nor paroxysmal nocturnal dyspnea,  NO Nausea, vomiting, nor diarrhea,  NO Hematochezia nor melena,  NO Dysuria, hematuria, nor new incontinence issues  NO new severe headaches nor neurological complaints,  NO new issues with anxiety nor depression nor new psychiatric complaints,  NO suicidal nor homicidal ideations.     OBJECTIVE:  /68 (BP Location: Left arm, Patient Position: Sitting, BP Cuff Size: Adult)   Pulse 56   Temp 36.8 °C (98.3 °F) (Temporal)   Resp 16   Ht 1.651 m (5' 5\")   Wt 72.6 kg (160 lb)   LMP  (LMP Unknown)   SpO2 97%   BMI 26.63 kg/m²      General:  alert, oriented, no acute distress.  No obvious skin rashes noted.   No gait disturbance noted.    Mood is pleasant, not tearful, no signs of emotional distress.  Normal, appropriate behavior.    HEENT: Normocephalic, atraumatic,   Pupils round, reactive to light    Neck: no nuchal rigidity  Respiratory: Equal breath sounds  No wheezes,    rales,    nor rhonchi  No respiratory distress.    Heart: Regular rate and rhythm, no    murmurs  no rubs/gallops    Abdomen: no masses palpable, no rebound nor guarding, no rebound nor guarding.    Extremities: NO cyanosis noted, no clubbing.   No edema noted.  Normal-not antalgic, steady gait.    1-3mm lesion tiny r upper lip above vermilion border actually cutaneous lip r " border      BIOPSY:  The patient was prepped and draped in normal sterile fashion and fully consented for a biopsy of sites identified:                1  cc lidocaine    with     epi total injected          shave biopsy performed     Excellent hemostasis obtained with      electrocautery  EBL  <   2 ml    0 sutures placed    Patient tolerated procedure well no complications or unanticipated events during procedure.    Patient was instructed on wound care, signs and symptoms of infection and instructed to call if occur.  If uncontrolled bleeding-call or ER as this is not anticipated.    Recheck would advised at next scheduled visit-sooner if issues arise.  Lab on 07/24/2024   Component Date Value Ref Range Status    WBC 07/24/2024 5.1  4.4 - 11.3 x10*3/uL Final    nRBC 07/24/2024 0.0  0.0 - 0.0 /100 WBCs Final    RBC 07/24/2024 4.01  4.00 - 5.20 x10*6/uL Final    Hemoglobin 07/24/2024 12.3  12.0 - 16.0 g/dL Final    Hematocrit 07/24/2024 37.6  36.0 - 46.0 % Final    MCV 07/24/2024 94  80 - 100 fL Final    MCH 07/24/2024 30.7  26.0 - 34.0 pg Final    MCHC 07/24/2024 32.7  32.0 - 36.0 g/dL Final    RDW 07/24/2024 14.0  11.5 - 14.5 % Final    Platelets 07/24/2024 273  150 - 450 x10*3/uL Final    Neutrophils % 07/24/2024 35.9  40.0 - 80.0 % Final    Immature Granulocytes %, Automated 07/24/2024 0.4  0.0 - 0.9 % Final    Immature Granulocyte Count (IG) includes promyelocytes, myelocytes and metamyelocytes but does not include bands. Percent differential counts (%) should be interpreted in the context of the absolute cell counts (cells/UL).    Lymphocytes % 07/24/2024 34.6  13.0 - 44.0 % Final    Monocytes % 07/24/2024 16.4  2.0 - 10.0 % Final    Eosinophils % 07/24/2024 11.3  0.0 - 6.0 % Final    Basophils % 07/24/2024 1.4  0.0 - 2.0 % Final    Neutrophils Absolute 07/24/2024 1.82  1.60 - 5.50 x10*3/uL Final    Percent differential counts (%) should be interpreted in the context of the absolute cell counts (cells/uL).     Immature Granulocytes Absolute, Au* 07/24/2024 0.02  0.00 - 0.50 x10*3/uL Final    Lymphocytes Absolute 07/24/2024 1.75  0.80 - 3.00 x10*3/uL Final    Monocytes Absolute 07/24/2024 0.83 (H)  0.05 - 0.80 x10*3/uL Final    Eosinophils Absolute 07/24/2024 0.57 (H)  0.00 - 0.40 x10*3/uL Final    Basophils Absolute 07/24/2024 0.07  0.00 - 0.10 x10*3/uL Final    Glucose 07/24/2024 89  74 - 99 mg/dL Final    Sodium 07/24/2024 140  136 - 145 mmol/L Final    Potassium 07/24/2024 4.3  3.5 - 5.3 mmol/L Final    Chloride 07/24/2024 106  98 - 107 mmol/L Final    Bicarbonate 07/24/2024 30  21 - 32 mmol/L Final    Anion Gap 07/24/2024 8 (L)  10 - 20 mmol/L Final    Urea Nitrogen 07/24/2024 19  6 - 23 mg/dL Final    Creatinine 07/24/2024 0.91  0.50 - 1.05 mg/dL Final    eGFR 07/24/2024 67  >60 mL/min/1.73m*2 Final    Calculations of estimated GFR are performed using the 2021 CKD-EPI Study Refit equation without the race variable for the IDMS-Traceable creatinine methods.  https://jasn.asnjournals.org/content/early/2021/09/22/ASN.9391626535    Calcium 07/24/2024 9.3  8.6 - 10.3 mg/dL Final    Albumin 07/24/2024 4.1  3.4 - 5.0 g/dL Final    Alkaline Phosphatase 07/24/2024 100  33 - 136 U/L Final    Total Protein 07/24/2024 6.8  6.4 - 8.2 g/dL Final    AST 07/24/2024 21  9 - 39 U/L Final    Bilirubin, Total 07/24/2024 0.4  0.0 - 1.2 mg/dL Final    ALT 07/24/2024 13  7 - 45 U/L Final    Patients treated with Sulfasalazine may generate falsely decreased results for ALT.    Cholesterol 07/24/2024 185  0 - 199 mg/dL Final          Age      Desirable   Borderline High   High     0-19 Y     0 - 169       170 - 199     >/= 200    20-24 Y     0 - 189       190 - 224     >/= 225         >24 Y     0 - 199       200 - 239     >/= 240   **All ranges are based on fasting samples. Specific   therapeutic targets will vary based on patient-specific   cardiac risk.    Pediatric guidelines reference:Pediatrics 2011, 128(S5).Adult guidelines reference:  NCEP ATPIII Guidelines,ERENDIRA 2001, 258:2486-97    Venipuncture immediately after or during the administration of Metamizole may lead to falsely low results. Testing should be performed immediately prior to Metamizole dosing.    HDL-Cholesterol 07/24/2024 69.0  mg/dL Final      Age       Very Low   Low     Normal    High    0-19 Y    < 35      < 40     40-45     ----  20-24 Y    ----     < 40      >45      ----        >24 Y      ----     < 40     40-60      >60      Cholesterol/HDL Ratio 07/24/2024 2.7   Final      Ref Values  Desirable  < 3.4  High Risk  > 5.0    LDL Calculated 07/24/2024 99  <=99 mg/dL Final                                Near   Borderline      AGE      Desirable  Optimal    High     High     Very High     0-19 Y     0 - 109     ---    110-129   >/= 130     ----    20-24 Y     0 - 119     ---    120-159   >/= 160     ----      >24 Y     0 -  99   100-129  130-159   160-189     >/=190      VLDL 07/24/2024 17  0 - 40 mg/dL Final    Triglycerides 07/24/2024 83  0 - 149 mg/dL Final       Age         Desirable   Borderline High   High     Very High   0 D-90 D    19 - 174         ----         ----        ----  91 D- 9 Y     0 -  74        75 -  99     >/= 100      ----    10-19 Y     0 -  89        90 - 129     >/= 130      ----    20-24 Y     0 - 114       115 - 149     >/= 150      ----         >24 Y     0 - 149       150 - 199    200- 499    >/= 500    Venipuncture immediately after or during the administration of Metamizole may lead to falsely low results. Testing should be performed immediately prior to Metamizole dosing.    Non HDL Cholesterol 07/24/2024 116  0 - 149 mg/dL Final          Age       Desirable   Borderline High   High     Very High     0-19 Y     0 - 119       120 - 144     >/= 145    >/= 160    20-24 Y     0 - 149       150 - 189     >/= 190      ----         >24 Y    30 mg/dL above LDL Cholesterol goal      Thyroid Stimulating Hormone 07/24/2024 1.06  0.44 - 3.98 mIU/L Final     Thyroxine, Free 07/24/2024 0.95  0.61 - 1.12 ng/dL Final        Assessment/Plan     Problem List Items Addressed This Visit       Other specified hypothyroidism     Other Visit Diagnoses       Lip lesion    -  Primary            Patient was informed of wound care and signs and symptoms of infection necessitating a phone call/ office visit or emergency room visit. Expected course of healing and common reactions also discussed. If specimen sent for pathology results should be able to be seen on my chart in 1-2 weeks and or letter sent. If nothing received by 3 weeks time-patient is aware they need to contact office for results as this is not usual.     Try hcze 2wks upper lip area if not resolved needs nitrous

## 2024-09-17 ENCOUNTER — APPOINTMENT (OUTPATIENT)
Dept: PRIMARY CARE | Facility: CLINIC | Age: 74
End: 2024-09-17
Payer: MEDICARE

## 2024-09-18 LAB
LABORATORY COMMENT REPORT: NORMAL
PATH REPORT.FINAL DX SPEC: NORMAL
PATH REPORT.GROSS SPEC: NORMAL
PATH REPORT.MICROSCOPIC SPEC OTHER STN: NORMAL
PATH REPORT.RELEVANT HX SPEC: NORMAL
PATH REPORT.TOTAL CANCER: NORMAL

## 2024-09-27 DIAGNOSIS — C44.01 BASAL CELL CARCINOMA (BCC) OF SKIN OF LIP: ICD-10-CM

## 2024-10-14 ENCOUNTER — TELEPHONE (OUTPATIENT)
Dept: ORTHOPEDIC SURGERY | Facility: CLINIC | Age: 74
End: 2024-10-14
Payer: MEDICARE

## 2024-10-14 NOTE — TELEPHONE ENCOUNTER
Patient LVM asking if an antibiotic could be sent to her Pharmacy due to her having surgery tomorrow.

## 2024-10-15 DIAGNOSIS — M65.30 TRIGGER FINGER OF RIGHT HAND, UNSPECIFIED FINGER: Primary | ICD-10-CM

## 2024-10-15 PROCEDURE — 26055 INCISE FINGER TENDON SHEATH: CPT | Performed by: STUDENT IN AN ORGANIZED HEALTH CARE EDUCATION/TRAINING PROGRAM

## 2024-10-15 RX ORDER — TRAMADOL HYDROCHLORIDE 50 MG/1
50 TABLET ORAL EVERY 8 HOURS PRN
Qty: 5 TABLET | Refills: 0 | Status: SHIPPED | OUTPATIENT
Start: 2024-10-15 | End: 2024-10-18

## 2024-10-15 RX ORDER — IBUPROFEN 800 MG/1
800 TABLET ORAL 3 TIMES DAILY
Qty: 15 TABLET | Refills: 0 | Status: SHIPPED | OUTPATIENT
Start: 2024-10-15 | End: 2024-10-20

## 2024-10-15 NOTE — PROGRESS NOTES
TRIGGER FINGER RELEASE  PREOPERATIVE DIAGNOSIS:   Right Ring Trigger Finger   POSTOPERATIVE DIAGNOSIS:  Right Ring Trigger Finger  PROCEDURE:                              Right Ring Trigger Finger Release ([id number])  SURGEON:                                    Cassia Abreu MD    ANESTHESIA:                              Local.  COMPLICATIONS:                     None.  INDICATIONS FOR SURGERY:    The patient has been treated for an on-going finger pain and catching that has failed non-operative treatment. On exam, patient demonstrates swelling and tenderness of the A1 pulley, with crepitans of the flexor tendon sheath, and stiffness of the finger. Surgical release was offered. Benefits include relief of symptoms. Risks include bleeding, infection, wound sensitivity, stiffness, nerve injury, tendon injury, and recurrence. The patient understood and consented willingly to surgery. The patient also understood the recovery and rehabilitation process.  DESCRIPTION OF PROCEDURE:  The patient was brought to the operating room and identified by the surgical staff. The operative limb was also identified by the surgical staff. The operative limb was then prepped and draped in standard sterile fashion. The operative site was infiltrated with local anesthesia.  An incision was placed over the site of the A1 pulley of the operative digit. Blunt dissection was performed down to the pulley. The neurovascular structures on each side was protected. The A1 pulley tendon sheath was cut in its entirety longitudinally up to the A2 pulley. Enveloping tenosynovitis of both the flexor digitorum profundus and flexor digitorum sublimis tendons were sharply debrided down to clean tendons, each. The tendons were pulled out of the wound with force and held in order to perform a traction tenolysis and take down all residual proximal and distal adhesions and maximize tendon excursion. Lastly, the patient was asked to take the finger  actively through a full arc of motion to confirm that there was no longer any triggering.  Once satisfied the wound was copiously irrigated. The incision was closed. A bulky soft dressing was applied. The patient was taken to the recovery in stable condition.  I was present for the entire length of the case.     Cassia Abreu MD

## 2024-10-24 ENCOUNTER — APPOINTMENT (OUTPATIENT)
Dept: PLASTIC SURGERY | Facility: CLINIC | Age: 74
End: 2024-10-24
Payer: MEDICARE

## 2024-10-28 ENCOUNTER — OFFICE VISIT (OUTPATIENT)
Dept: ORTHOPEDIC SURGERY | Facility: CLINIC | Age: 74
End: 2024-10-28
Payer: MEDICARE

## 2024-10-28 DIAGNOSIS — M65.30 TRIGGER FINGER OF RIGHT HAND, UNSPECIFIED FINGER: Primary | ICD-10-CM

## 2024-10-28 DIAGNOSIS — Z98.890 STATUS POST TRIGGER FINGER RELEASE: ICD-10-CM

## 2024-10-28 PROCEDURE — 99024 POSTOP FOLLOW-UP VISIT: CPT | Performed by: STUDENT IN AN ORGANIZED HEALTH CARE EDUCATION/TRAINING PROGRAM

## 2024-10-28 PROCEDURE — 1036F TOBACCO NON-USER: CPT | Performed by: STUDENT IN AN ORGANIZED HEALTH CARE EDUCATION/TRAINING PROGRAM

## 2024-10-28 PROCEDURE — 1159F MED LIST DOCD IN RCRD: CPT | Performed by: STUDENT IN AN ORGANIZED HEALTH CARE EDUCATION/TRAINING PROGRAM

## 2024-10-28 PROCEDURE — 99211 OFF/OP EST MAY X REQ PHY/QHP: CPT | Performed by: STUDENT IN AN ORGANIZED HEALTH CARE EDUCATION/TRAINING PROGRAM

## 2024-10-28 RX ORDER — SILVER SULFADIAZINE 10 G/1000G
CREAM TOPICAL DAILY
Qty: 50 G | Refills: 1 | Status: SHIPPED | OUTPATIENT
Start: 2024-10-28

## 2024-11-04 ENCOUNTER — APPOINTMENT (OUTPATIENT)
Dept: PLASTIC SURGERY | Facility: CLINIC | Age: 74
End: 2024-11-04
Payer: MEDICARE

## 2024-11-11 ENCOUNTER — APPOINTMENT (OUTPATIENT)
Dept: PLASTIC SURGERY | Facility: CLINIC | Age: 74
End: 2024-11-11
Payer: MEDICARE

## 2024-11-12 DIAGNOSIS — E03.8 OTHER SPECIFIED HYPOTHYROIDISM: ICD-10-CM

## 2024-11-12 RX ORDER — LEVOTHYROXINE SODIUM 75 UG/1
75 TABLET ORAL DAILY
Qty: 90 TABLET | Refills: 1 | Status: SHIPPED | OUTPATIENT
Start: 2024-11-12

## 2024-11-18 ENCOUNTER — APPOINTMENT (OUTPATIENT)
Dept: ORTHOPEDIC SURGERY | Facility: CLINIC | Age: 74
End: 2024-11-18
Payer: MEDICARE

## 2025-02-13 ENCOUNTER — APPOINTMENT (OUTPATIENT)
Dept: PRIMARY CARE | Facility: CLINIC | Age: 75
End: 2025-02-13
Payer: MEDICARE

## 2025-02-13 VITALS
SYSTOLIC BLOOD PRESSURE: 114 MMHG | HEART RATE: 56 BPM | DIASTOLIC BLOOD PRESSURE: 70 MMHG | WEIGHT: 179 LBS | BODY MASS INDEX: 29.82 KG/M2 | OXYGEN SATURATION: 96 % | HEIGHT: 65 IN

## 2025-02-13 DIAGNOSIS — E55.9 VITAMIN D DEFICIENCY: ICD-10-CM

## 2025-02-13 DIAGNOSIS — Z85.3 HISTORY OF BREAST CANCER: ICD-10-CM

## 2025-02-13 DIAGNOSIS — Z79.899 DRUG THERAPY: ICD-10-CM

## 2025-02-13 DIAGNOSIS — E03.8 OTHER SPECIFIED HYPOTHYROIDISM: ICD-10-CM

## 2025-02-13 DIAGNOSIS — Z00.00 ROUTINE GENERAL MEDICAL EXAMINATION AT HEALTH CARE FACILITY: Primary | ICD-10-CM

## 2025-02-13 DIAGNOSIS — Z13.9 SCREENING FOR CONDITION: ICD-10-CM

## 2025-02-13 DIAGNOSIS — M17.11 PRIMARY OSTEOARTHRITIS OF RIGHT KNEE: ICD-10-CM

## 2025-02-13 PROCEDURE — 1123F ACP DISCUSS/DSCN MKR DOCD: CPT | Performed by: FAMILY MEDICINE

## 2025-02-13 PROCEDURE — 1158F ADVNC CARE PLAN TLK DOCD: CPT | Performed by: FAMILY MEDICINE

## 2025-02-13 PROCEDURE — 99214 OFFICE O/P EST MOD 30 MIN: CPT | Performed by: FAMILY MEDICINE

## 2025-02-13 PROCEDURE — 99397 PER PM REEVAL EST PAT 65+ YR: CPT | Performed by: FAMILY MEDICINE

## 2025-02-13 PROCEDURE — 3008F BODY MASS INDEX DOCD: CPT | Performed by: FAMILY MEDICINE

## 2025-02-13 PROCEDURE — 1159F MED LIST DOCD IN RCRD: CPT | Performed by: FAMILY MEDICINE

## 2025-02-13 PROCEDURE — 1170F FXNL STATUS ASSESSED: CPT | Performed by: FAMILY MEDICINE

## 2025-02-13 PROCEDURE — 1160F RVW MEDS BY RX/DR IN RCRD: CPT | Performed by: FAMILY MEDICINE

## 2025-02-13 PROCEDURE — G0439 PPPS, SUBSEQ VISIT: HCPCS | Performed by: FAMILY MEDICINE

## 2025-02-13 PROCEDURE — 1036F TOBACCO NON-USER: CPT | Performed by: FAMILY MEDICINE

## 2025-02-13 RX ORDER — LEVOTHYROXINE SODIUM 75 UG/1
75 TABLET ORAL DAILY
Qty: 90 TABLET | Refills: 1 | Status: SHIPPED | OUTPATIENT
Start: 2025-02-13

## 2025-02-13 ASSESSMENT — ACTIVITIES OF DAILY LIVING (ADL)
BATHING: INDEPENDENT
DOING_HOUSEWORK: INDEPENDENT
TAKING_MEDICATION: INDEPENDENT
DRESSING: INDEPENDENT
GROCERY_SHOPPING: INDEPENDENT
MANAGING_FINANCES: INDEPENDENT

## 2025-02-13 ASSESSMENT — ENCOUNTER SYMPTOMS
DEPRESSION: 0
LOSS OF SENSATION IN FEET: 0
OCCASIONAL FEELINGS OF UNSTEADINESS: 0

## 2025-02-13 ASSESSMENT — PATIENT HEALTH QUESTIONNAIRE - PHQ9
2. FEELING DOWN, DEPRESSED OR HOPELESS: NOT AT ALL
SUM OF ALL RESPONSES TO PHQ9 QUESTIONS 1 AND 2: 0
1. LITTLE INTEREST OR PLEASURE IN DOING THINGS: NOT AT ALL

## 2025-02-13 NOTE — PROGRESS NOTES
"Subjective   Patient ID: Rasheeda Hawley is a 74 y.o. female who presents for Establish Care (Pt in today to establish care).    Review of Systems  Denies N/V/D/C, no HA/S/V, denies rashes/lesions, no CP/SOB. Denies fevers/chills.  All other systems were negative.     Objective   /70 (BP Location: Left arm, Patient Position: Sitting, BP Cuff Size: Large adult)   Pulse 56   Ht 1.651 m (5' 5\")   Wt 81.2 kg (179 lb)   LMP  (LMP Unknown)   SpO2 96%   BMI 29.79 kg/m²     Physical Exam  Gen: NAD  eyes: EOMI, PERRLA  ENT: hearing grossly intact, no nasal discharge  resp: CTABL, without R/R  heart: RRR without MRG  GI: abd: S/ND/NT, BS+  lymph: no axillary, cervical, supraclavicular lymphadenopathy noted   MS: gait grossly WNL,  derm: no rashes or lesions noted  neuro: CN II-XII grossly intact  psych: A&Ox3    Assessment/Plan   Problem List Items Addressed This Visit             ICD-10-CM    Other specified hypothyroidism E03.8    Relevant Medications    levothyroxine (Synthroid, Levoxyl) 75 mcg tablet    Other Relevant Orders    Thyroxine, Free    Triiodothyronine, Free    Thyroid Stimulating Hormone    Primary osteoarthritis of right knee M17.11    History of breast cancer Z85.3    Drug therapy - Primary Z79.899    Relevant Orders    CBC and Auto Differential    Comprehensive Metabolic Panel    Magnesium    Urinalysis with Reflex Microscopic    Vitamin D deficiency E55.9    Relevant Orders    Vitamin D 25-Hydroxy,Total (for eval of Vitamin D levels)    Screening for condition Z13.9    Relevant Orders    Lipid Panel    Hemoglobin A1C     Patient here today to establish.  Will do Medicare wellness visit, annual preventative visit, lab planning today.    ----    Screening for skin cancer.  Patient has history of skin cancer.  Sees dermatology.  Follow-up as planned.    History of breast cancer.  Seeing oncology.  Patient declines screening.     Screening for colon cancer: Patient declines " screenings.    Immunization counseling: Due for annual flu shot and the latest COVID booster.  Due for the new RSV shot.  Up-to-date on pneumonia series.  Due for the Shingrix series.  Due for Tdap. -->>  Check with your pharmacy to keep up-to-date on immunizations.    -----        Reviewed previous labs:    - Dehydrated: Drink more water, good goal would be 3 quarts a day.  - Hypothyroidism.  TSH was optimal on the last labs on 75 mcg daily levothyroxine. -->>  Will refill as needed.  Will check thyroid panel with next annual labs.  - Drug therapy, screening for conditions: Will be rechecking annual labs in about 6 months.  - Vitamin D deficiency.  No vitamin D value was noted on the chart.  Patient does take vitamin D -->> make sure you are taking at least 5000 units / 125 mcg daily of vitamin D3 daily.  Could purchase 50,000 unit / 1250 mcg of vitamin D3 on [employer] and take 1 weekly.      Osteoarthritis, occasionally takes ibuprofen or meloxicam.  Can refill as needed.        - Will schedule virtual appointment in about 6 months for lab review.  - Will schedule appointment here about a week before the appointment to get fasting annual labs including a thyroid panel drawn.

## 2025-02-13 NOTE — PATIENT INSTRUCTIONS
Patient here today to establish.  Will do Medicare wellness visit, annual preventative visit, lab planning today.    ----    Screening for skin cancer.  Patient has history of skin cancer.  Sees dermatology.  Follow-up as planned.    History of breast cancer.  Seeing oncology.  Patient declines screening.     Screening for colon cancer: Patient declines screenings.    Immunization counseling: Due for annual flu shot and the latest COVID booster.  Due for the new RSV shot.  Up-to-date on pneumonia series.  Due for the Shingrix series.  Due for Tdap. -->>  Check with your pharmacy to keep up-to-date on immunizations.    -----        Reviewed previous labs:    - Dehydrated: Drink more water, good goal would be 3 quarts a day.  - Hypothyroidism.  TSH was optimal on the last labs on 75 mcg daily levothyroxine. -->>  Will refill as needed.  Will check thyroid panel with next annual labs.  - Drug therapy, screening for conditions: Will be rechecking annual labs in about 6 months.  - Vitamin D deficiency.  No vitamin D value was noted on the chart.  Patient does take vitamin D -->> make sure you are taking at least 5000 units / 125 mcg daily of vitamin D3 daily.  Could purchase 50,000 unit / 1250 mcg of vitamin D3 on [employer] and take 1 weekly.      Osteoarthritis, occasionally takes ibuprofen or meloxicam.  Can refill as needed.        - Will schedule virtual appointment in about 6 months for lab review.  - Will schedule appointment here about a week before the appointment to get fasting annual labs including a thyroid panel drawn.

## 2025-03-07 DIAGNOSIS — Z96.653 S/P TOTAL KNEE ARTHROPLASTY, BILATERAL: Primary | ICD-10-CM

## 2025-03-10 NOTE — PROGRESS NOTES
Chief Complaint   Patient presents with    Left Knee - Follow-up     Left TKA 11-2-16  Xrays today    Right Knee - Follow-up     Right TKA 4-18-22  Xrays today         The patient is here for follow-up of their side: bilateral knee arthroplasty.  The patient has mild medial leftknee pain.  The patient has no mechanical symptoms.  The patient has no swelling.  The patient is approximately 3 years postop on the right 9 years postop on the left.  She is noting some medial left knee pain in certain positions.  After a long discussion with her I do believe this is from her swimming and doing lateral kicks.  I advise she do more of a anterior to posterior kick instead of the medial to lateral.    Physical examination:    Examination of the side: bilateral knee  The incision is healing well  No erythema or warmth.  No instability varus or valgus stressing the knee at 0, 30 or 60 degrees.  No instability in the AP plane at 90 degrees.  Range of motion: 0 degrees extension, 120 degrees flexion  There is no tenderness  Calf is soft, Homans negative  The patient has intact ankle dorsiflexion and plantarflexion.    Radiographs:   XR knee 3 views bilateral  Interpreted By:  Fer Mckeon,   STUDY:  XR KNEE 3 VIEWS BILATERAL; ; 3/5/2024 9:43 am      INDICATION:  Signs/Symptoms:pain.      ACCESSION NUMBER(S):  [id number]      ORDERING CLINICIAN:  FER MCKEON      FINDINGS:  Bilateral knee films show total knee arthroplasties in satisfactory  position. The patella are well positioned. No fracture is identified.  No obvious loosening or wear is appreciated.          Signed by: Fer Mckeon 3/5/2024 9:53 AM  Dictation workstation:   [id number]        Impression:  Status post side: bilateral total knee arthroplasty    Plan:  Discussed the importance of prophylactic dental antibiotics  Follow up in 1 year with x-rays  All questions answered

## 2025-03-11 ENCOUNTER — APPOINTMENT (OUTPATIENT)
Dept: ORTHOPEDIC SURGERY | Facility: CLINIC | Age: 75
End: 2025-03-11
Payer: MEDICARE

## 2025-03-11 ENCOUNTER — HOSPITAL ENCOUNTER (OUTPATIENT)
Dept: RADIOLOGY | Facility: HOSPITAL | Age: 75
Discharge: HOME | End: 2025-03-11
Payer: MEDICARE

## 2025-03-11 DIAGNOSIS — Z96.653 S/P TOTAL KNEE ARTHROPLASTY, BILATERAL: ICD-10-CM

## 2025-03-11 DIAGNOSIS — Z96.653 S/P TOTAL KNEE ARTHROPLASTY, BILATERAL: Primary | ICD-10-CM

## 2025-03-11 PROCEDURE — 99213 OFFICE O/P EST LOW 20 MIN: CPT | Performed by: ORTHOPAEDIC SURGERY

## 2025-03-11 PROCEDURE — 73562 X-RAY EXAM OF KNEE 3: CPT | Mod: 50

## 2025-03-11 PROCEDURE — 1123F ACP DISCUSS/DSCN MKR DOCD: CPT | Performed by: ORTHOPAEDIC SURGERY

## 2025-06-16 DIAGNOSIS — Z12.31 ENCOUNTER FOR SCREENING MAMMOGRAM FOR BREAST CANCER: ICD-10-CM

## 2025-07-10 ENCOUNTER — OFFICE VISIT (OUTPATIENT)
Dept: PRIMARY CARE | Facility: CLINIC | Age: 75
End: 2025-07-10
Payer: MEDICARE

## 2025-07-10 VITALS
BODY MASS INDEX: 26.66 KG/M2 | SYSTOLIC BLOOD PRESSURE: 119 MMHG | OXYGEN SATURATION: 98 % | WEIGHT: 160 LBS | DIASTOLIC BLOOD PRESSURE: 67 MMHG | HEART RATE: 61 BPM | HEIGHT: 65 IN

## 2025-07-10 DIAGNOSIS — L23.7 POISON IVY DERMATITIS: Primary | ICD-10-CM

## 2025-07-10 PROCEDURE — G2211 COMPLEX E/M VISIT ADD ON: HCPCS | Performed by: FAMILY MEDICINE

## 2025-07-10 PROCEDURE — 99214 OFFICE O/P EST MOD 30 MIN: CPT | Performed by: FAMILY MEDICINE

## 2025-07-10 PROCEDURE — 3008F BODY MASS INDEX DOCD: CPT | Performed by: FAMILY MEDICINE

## 2025-07-10 PROCEDURE — 1036F TOBACCO NON-USER: CPT | Performed by: FAMILY MEDICINE

## 2025-07-10 PROCEDURE — 1159F MED LIST DOCD IN RCRD: CPT | Performed by: FAMILY MEDICINE

## 2025-07-10 RX ORDER — METHYLPREDNISOLONE 4 MG/1
TABLET ORAL
Qty: 21 TABLET | Refills: 0 | Status: SHIPPED | OUTPATIENT
Start: 2025-07-10 | End: 2025-07-16

## 2025-07-10 NOTE — PROGRESS NOTES
"Subjective   Patient ID: Rasheeda Hawley is a 74 y.o. female who presents for Poison Ivy (Pt in today with c/o poison ivy).    Review of Systems  Denies N/V/D/C, no HA/S/V, denies rashes/lesions, no CP/SOB. Denies fevers/chills.  All other systems were negative.     Objective   /67 (BP Location: Left arm, Patient Position: Sitting, BP Cuff Size: Adult)   Pulse 61   Ht 1.651 m (5' 5\")   Wt 72.6 kg (160 lb)   LMP  (LMP Unknown)   SpO2 98%   BMI 26.63 kg/m²     Physical Exam  Gen: NAD  eyes: EOMI, PERRLA  ENT: hearing grossly intact, no nasal discharge  resp: CTABL, without R/R  heart: RRR without MRG  GI: abd: S/ND/NT, BS+  lymph: no axillary, cervical, supraclavicular lymphadenopathy noted   MS: gait grossly WNL,  derm: no rashes or lesions noted  neuro: CN II-XII grossly intact  psych: A&Ox3    Assessment/Plan   Problem List Items Addressed This Visit           ICD-10-CM    Poison ivy dermatitis - Primary L23.7    Relevant Medications    methylPREDNISolone (Medrol Dospak) 4 mg tablets       Poison ivy.  Fairly severe.  Was weed whacking few days ago.  Since then started getting blisters on bilateral upper and lower extremities.  Still getting fresh blisters even several days later.  Patient has just basically rewashed all clothing involved. -->> Will prescribe a Medrol Dosepak.  Could use calamine lotion on fresh lesions, and or Vaseline or antibiotic ointment on healing lesions.       Return next month as scheduled for follow-up.  "

## 2025-07-10 NOTE — PATIENT INSTRUCTIONS
Poison ivy.  Fairly severe.  Was weed whacking few days ago.  Since then started getting blisters on bilateral upper and lower extremities.  Still getting fresh blisters even several days later.  Patient has just basically rewashed all clothing involved. -->> Will prescribe a Medrol Dosepak.  Could use calamine lotion on fresh lesions, and or Vaseline or antibiotic ointment on healing lesions.       Return next month as scheduled for follow-up.

## 2025-08-06 ENCOUNTER — APPOINTMENT (OUTPATIENT)
Dept: PRIMARY CARE | Facility: CLINIC | Age: 75
End: 2025-08-06
Payer: MEDICARE

## 2025-08-06 DIAGNOSIS — Z13.9 SCREENING FOR CONDITION: ICD-10-CM

## 2025-08-06 DIAGNOSIS — E55.9 VITAMIN D DEFICIENCY: ICD-10-CM

## 2025-08-06 DIAGNOSIS — Z79.899 DRUG THERAPY: ICD-10-CM

## 2025-08-06 DIAGNOSIS — E03.8 OTHER SPECIFIED HYPOTHYROIDISM: ICD-10-CM

## 2025-08-06 NOTE — PROGRESS NOTES
Subjective   Patient ID: Rasheeda Hawley is a 74 y.o. female who presents for Labs Only (Pt in today for lab draw).    HPI     Review of Systems    Objective   LMP  (LMP Unknown)     Physical Exam    Assessment/Plan

## 2025-08-07 LAB
25(OH)D3+25(OH)D2 SERPL-MCNC: 92 NG/ML (ref 30–100)
ALBUMIN SERPL-MCNC: 4.2 G/DL (ref 3.6–5.1)
ALP SERPL-CCNC: 91 U/L (ref 37–153)
ALT SERPL-CCNC: 11 U/L (ref 6–29)
ANION GAP SERPL CALCULATED.4IONS-SCNC: 7 MMOL/L (CALC) (ref 7–17)
APPEARANCE UR: CLEAR
AST SERPL-CCNC: 19 U/L (ref 10–35)
BACTERIA #/AREA URNS HPF: ABNORMAL /HPF
BASOPHILS # BLD AUTO: 32 CELLS/UL (ref 0–200)
BASOPHILS NFR BLD AUTO: 0.6 %
BILIRUB SERPL-MCNC: 0.5 MG/DL (ref 0.2–1.2)
BILIRUB UR QL STRIP: NEGATIVE
BUN SERPL-MCNC: 19 MG/DL (ref 7–25)
CALCIUM SERPL-MCNC: 9.6 MG/DL (ref 8.6–10.4)
CAOX CRY #/AREA URNS HPF: ABNORMAL /HPF
CHLORIDE SERPL-SCNC: 104 MMOL/L (ref 98–110)
CHOLEST SERPL-MCNC: 172 MG/DL
CHOLEST/HDLC SERPL: 2.3 (CALC)
CO2 SERPL-SCNC: 29 MMOL/L (ref 20–32)
COLOR UR: YELLOW
CREAT SERPL-MCNC: 0.98 MG/DL (ref 0.6–1)
EGFRCR SERPLBLD CKD-EPI 2021: 61 ML/MIN/1.73M2
EOSINOPHIL # BLD AUTO: 648 CELLS/UL (ref 15–500)
EOSINOPHIL NFR BLD AUTO: 12 %
ERYTHROCYTE [DISTWIDTH] IN BLOOD BY AUTOMATED COUNT: 14.6 % (ref 11–15)
EST. AVERAGE GLUCOSE BLD GHB EST-MCNC: 128 MG/DL
EST. AVERAGE GLUCOSE BLD GHB EST-SCNC: 7.1 MMOL/L
GLUCOSE SERPL-MCNC: 101 MG/DL (ref 65–99)
GLUCOSE UR QL STRIP: NEGATIVE
HBA1C MFR BLD: 6.1 %
HCT VFR BLD AUTO: 40.6 % (ref 35–45)
HDLC SERPL-MCNC: 76 MG/DL
HGB BLD-MCNC: 13.1 G/DL (ref 11.7–15.5)
HGB UR QL STRIP: ABNORMAL
HYALINE CASTS #/AREA URNS LPF: ABNORMAL /LPF
KETONES UR QL STRIP: NEGATIVE
LDLC SERPL CALC-MCNC: 81 MG/DL (CALC)
LEUKOCYTE ESTERASE UR QL STRIP: ABNORMAL
LYMPHOCYTES # BLD AUTO: 1679 CELLS/UL (ref 850–3900)
LYMPHOCYTES NFR BLD AUTO: 31.1 %
MAGNESIUM SERPL-MCNC: 2 MG/DL (ref 1.5–2.5)
MCH RBC QN AUTO: 31 PG (ref 27–33)
MCHC RBC AUTO-ENTMCNC: 32.3 G/DL (ref 32–36)
MCV RBC AUTO: 96.2 FL (ref 80–100)
MONOCYTES # BLD AUTO: 810 CELLS/UL (ref 200–950)
MONOCYTES NFR BLD AUTO: 15 %
NEUTROPHILS # BLD AUTO: 2230 CELLS/UL (ref 1500–7800)
NEUTROPHILS NFR BLD AUTO: 41.3 %
NITRITE UR QL STRIP: NEGATIVE
NONHDLC SERPL-MCNC: 96 MG/DL (CALC)
PH UR STRIP: ABNORMAL [PH] (ref 5–8)
PLATELET # BLD AUTO: 277 THOUSAND/UL (ref 140–400)
PMV BLD REES-ECKER: 10.4 FL (ref 7.5–12.5)
POTASSIUM SERPL-SCNC: 4.5 MMOL/L (ref 3.5–5.3)
PROT SERPL-MCNC: 7 G/DL (ref 6.1–8.1)
PROT UR QL STRIP: NEGATIVE
RBC # BLD AUTO: 4.22 MILLION/UL (ref 3.8–5.1)
RBC #/AREA URNS HPF: ABNORMAL /HPF
SERVICE CMNT-IMP: ABNORMAL
SODIUM SERPL-SCNC: 140 MMOL/L (ref 135–146)
SP GR UR STRIP: 1.02 (ref 1–1.03)
SQUAMOUS #/AREA URNS HPF: ABNORMAL /HPF
T3FREE SERPL-MCNC: 3.1 PG/ML (ref 2.3–4.2)
T4 FREE SERPL-MCNC: 1.4 NG/DL (ref 0.8–1.8)
TRANS CELLS #/AREA URNS HPF: ABNORMAL /HPF
TRIGL SERPL-MCNC: 70 MG/DL
TSH SERPL-ACNC: 0.3 MIU/L (ref 0.4–4.5)
WBC # BLD AUTO: 5.4 THOUSAND/UL (ref 3.8–10.8)
WBC #/AREA URNS HPF: ABNORMAL /HPF

## 2025-08-14 ENCOUNTER — APPOINTMENT (OUTPATIENT)
Dept: PRIMARY CARE | Facility: CLINIC | Age: 75
End: 2025-08-14
Payer: MEDICARE

## 2025-08-14 DIAGNOSIS — M17.11 PRIMARY OSTEOARTHRITIS OF RIGHT KNEE: ICD-10-CM

## 2025-08-14 DIAGNOSIS — E55.9 VITAMIN D DEFICIENCY: Primary | ICD-10-CM

## 2025-08-14 DIAGNOSIS — Z00.00 ROUTINE GENERAL MEDICAL EXAMINATION AT HEALTH CARE FACILITY: ICD-10-CM

## 2025-08-14 DIAGNOSIS — R73.03 PREDIABETES: ICD-10-CM

## 2025-08-14 DIAGNOSIS — Z79.899 DRUG THERAPY: ICD-10-CM

## 2025-08-14 DIAGNOSIS — Z13.9 SCREENING FOR CONDITION: ICD-10-CM

## 2025-08-14 DIAGNOSIS — E03.8 OTHER SPECIFIED HYPOTHYROIDISM: ICD-10-CM

## 2025-08-14 PROCEDURE — G2211 COMPLEX E/M VISIT ADD ON: HCPCS | Performed by: FAMILY MEDICINE

## 2025-08-14 PROCEDURE — 99214 OFFICE O/P EST MOD 30 MIN: CPT | Performed by: FAMILY MEDICINE

## 2025-08-14 RX ORDER — LEVOTHYROXINE SODIUM 75 UG/1
75 TABLET ORAL DAILY
Qty: 100 TABLET | Refills: 3 | Status: SHIPPED | OUTPATIENT
Start: 2025-08-14

## 2026-02-04 ENCOUNTER — APPOINTMENT (OUTPATIENT)
Dept: PRIMARY CARE | Facility: CLINIC | Age: 76
End: 2026-02-04
Payer: MEDICARE

## 2026-02-12 ENCOUNTER — APPOINTMENT (OUTPATIENT)
Dept: PRIMARY CARE | Facility: CLINIC | Age: 76
End: 2026-02-12
Payer: MEDICARE

## 2026-03-10 ENCOUNTER — APPOINTMENT (OUTPATIENT)
Dept: ORTHOPEDIC SURGERY | Facility: CLINIC | Age: 76
End: 2026-03-10
Payer: MEDICARE

## (undated) DEVICE — SUTURE ETHBND EXCEL SZ 1 L30IN NONABSORBABLE GRN L48MM CTX X865H

## (undated) DEVICE — PACK PROCEDURE SURG TOTAL KNEE PACK

## (undated) DEVICE — Z DISCONTINUED PER MEDLINE USE 2741942 DRESSING AQUACEL 6 IN ALG W9XL15CM SIL CVR WTRPRF VIR BACT BARR ANTIMIC

## (undated) DEVICE — COUNTER NDL 40 COUNT HLD 70 FOAM BLK ADH W/ MAG

## (undated) DEVICE — T-DRAPE,EXTREMITY,STERILE: Brand: MEDLINE

## (undated) DEVICE — Device: Brand: STABLECUT®

## (undated) DEVICE — PADDING CAST W6INXL4YD RAYON UNDERCAST SOF-ROL

## (undated) DEVICE — SHEET,DRAPE,53X77,STERILE: Brand: MEDLINE

## (undated) DEVICE — MARKER SURG SKIN GENTIAN VLT REG TIP W/ 6IN RUL

## (undated) DEVICE — HOOD: Brand: T7PLUS

## (undated) DEVICE — PACK,LAPAROTOMY,NO GOWNS: Brand: MEDLINE

## (undated) DEVICE — NEEDLE SPNL 18GA L3.5IN W/ QNCKE SHARPER BVL DURA CLICK

## (undated) DEVICE — TOWEL,OR,DSP,ST,BLUE,STD,4/PK,20PK/CS: Brand: MEDLINE

## (undated) DEVICE — SUTURE MCRYL SZ 4-0 L27IN ABSRB UD L19MM PS-2 1/2 CIR PRIM Y426H

## (undated) DEVICE — SYRINGE MED 50ML LUERLOCK TIP

## (undated) DEVICE — SIPS DUAL 2 MINUTE TIP

## (undated) DEVICE — GLOVE ORANGE PI 8   MSG9080

## (undated) DEVICE — HEADLESS TROCHAR PIN 75MM: Brand: ZUK

## (undated) DEVICE — GLOVE ORANGE PI 8 1/2   MSG9085

## (undated) DEVICE — 4-PORT MANIFOLD: Brand: NEPTUNE 2

## (undated) DEVICE — COVER LT HNDL BLU PLAS

## (undated) DEVICE — ADHESIVE SKIN CLSR 0.7ML TOP DERMBND ADV

## (undated) DEVICE — SUTURE VCRL SZ 2-0 L36IN ABSRB UD L36MM CT-1 1/2 CIR J945H

## (undated) DEVICE — BANDAGE COBAN 6 IN WND 6INX5YD FOAM

## (undated) DEVICE — ELECTRODE PT RET AD L9FT HI MOIST COND ADH HYDRGEL CORDED

## (undated) DEVICE — APPLICATOR MEDICATED 26 CC SOLUTION HI LT ORNG CHLORAPREP

## (undated) DEVICE — BANDAGE COMPR M W6INXL10YD WHT BGE VELC E MTRX HK AND LOOP

## (undated) DEVICE — SPONGE,LAP,18"X18",DLX,XR,ST,5/PK,40/PK: Brand: MEDLINE

## (undated) DEVICE — SYRINGE MED 10ML TRNSLUC BRL PLUNG BLK MRK POLYPR CTRL

## (undated) DEVICE — Z DISCONTINUED PER MEDLINE USE 2741944 DRESSING AQUACEL 12 IN SURG W9XL30CM SIL CVR WTRPRF VIR BACT BARR ANTIMIC

## (undated) DEVICE — SUTURE VCRL SZ 3-0 L27IN ABSRB UD L26MM SH 1/2 CIR J416H

## (undated) DEVICE — NEEDLE HYPO 25GA L1.5IN BLU POLYPR HUB S STL REG BVL STR

## (undated) DEVICE — GLOVE ORANGE PI 7 1/2   MSG9075

## (undated) DEVICE — SUTURE MCRYL SZ 3-0 L27IN ABSRB UD L19MM PS-2 3/8 CIR PRIM Y427H

## (undated) DEVICE — ABSORBENT ROLL ECODRI-SAFE

## (undated) DEVICE — INTENDED FOR TISSUE SEPARATION, AND OTHER PROCEDURES THAT REQUIRE A SHARP SURGICAL BLADE TO PUNCTURE OR CUT.: Brand: BARD-PARKER ® CARBON RIB-BACK BLADES

## (undated) DEVICE — STERILE PATIENT PROTECTIVE PAD FOR IMP® KNEE POSITIONERS & COHESIVE WRAP (10 / CASE): Brand: DE MAYO KNEE POSITIONER®

## (undated) DEVICE — GOWN,SIRUS,NONRNF,SETINSLV,2XL,18/CS: Brand: MEDLINE

## (undated) DEVICE — NEPTUNE E-SEP SMOKE EVACUATION PENCIL, COATED, 70MM BLADE, PUSH BUTTON SWITCH: Brand: NEPTUNE E-SEP

## (undated) DEVICE — DRAPE,U/ SHT,SPLIT,PLAS,STERIL: Brand: MEDLINE

## (undated) DEVICE — SUTURE VCRL SZ 1 L36IN ABSRB UD L36MM CT-1 1/2 CIR J947H

## (undated) DEVICE — GOWN,AURORA,NONREINFORCED,LARGE: Brand: MEDLINE

## (undated) DEVICE — LABEL MED MINI W/ MARKER